# Patient Record
Sex: MALE | Race: ASIAN | NOT HISPANIC OR LATINO
[De-identification: names, ages, dates, MRNs, and addresses within clinical notes are randomized per-mention and may not be internally consistent; named-entity substitution may affect disease eponyms.]

---

## 2017-01-03 ENCOUNTER — APPOINTMENT (OUTPATIENT)
Dept: SURGERY | Facility: CLINIC | Age: 82
End: 2017-01-03

## 2017-01-11 ENCOUNTER — APPOINTMENT (OUTPATIENT)
Dept: INTERNAL MEDICINE | Facility: CLINIC | Age: 82
End: 2017-01-11

## 2017-01-11 DIAGNOSIS — F41.1 GENERALIZED ANXIETY DISORDER: ICD-10-CM

## 2017-01-30 ENCOUNTER — OUTPATIENT (OUTPATIENT)
Dept: OUTPATIENT SERVICES | Facility: HOSPITAL | Age: 82
LOS: 1 days | End: 2017-01-30
Payer: MEDICARE

## 2017-01-30 VITALS
HEART RATE: 57 BPM | RESPIRATION RATE: 16 BRPM | SYSTOLIC BLOOD PRESSURE: 122 MMHG | OXYGEN SATURATION: 99 % | HEIGHT: 67 IN | TEMPERATURE: 98 F | WEIGHT: 115.08 LBS | DIASTOLIC BLOOD PRESSURE: 76 MMHG

## 2017-01-30 DIAGNOSIS — C02.9 MALIGNANT NEOPLASM OF TONGUE, UNSPECIFIED: ICD-10-CM

## 2017-01-30 DIAGNOSIS — E03.9 HYPOTHYROIDISM, UNSPECIFIED: ICD-10-CM

## 2017-01-30 DIAGNOSIS — C80.1 MALIGNANT (PRIMARY) NEOPLASM, UNSPECIFIED: ICD-10-CM

## 2017-01-30 LAB
ALBUMIN SERPL ELPH-MCNC: 4.3 G/DL — SIGNIFICANT CHANGE UP (ref 3.3–5)
ALP SERPL-CCNC: 50 U/L — SIGNIFICANT CHANGE UP (ref 40–120)
ALT FLD-CCNC: 20 U/L — SIGNIFICANT CHANGE UP (ref 4–41)
AST SERPL-CCNC: 39 U/L — SIGNIFICANT CHANGE UP (ref 4–40)
BILIRUB SERPL-MCNC: 0.3 MG/DL — SIGNIFICANT CHANGE UP (ref 0.2–1.2)
BUN SERPL-MCNC: 27 MG/DL — HIGH (ref 7–23)
CALCIUM SERPL-MCNC: 9.3 MG/DL — SIGNIFICANT CHANGE UP (ref 8.4–10.5)
CHLORIDE SERPL-SCNC: 104 MMOL/L — SIGNIFICANT CHANGE UP (ref 98–107)
CO2 SERPL-SCNC: 23 MMOL/L — SIGNIFICANT CHANGE UP (ref 22–31)
CREAT SERPL-MCNC: 2.03 MG/DL — HIGH (ref 0.5–1.3)
GLUCOSE SERPL-MCNC: 80 MG/DL — SIGNIFICANT CHANGE UP (ref 70–99)
HCT VFR BLD CALC: 46.9 % — SIGNIFICANT CHANGE UP (ref 39–50)
HGB BLD-MCNC: 15.3 G/DL — SIGNIFICANT CHANGE UP (ref 13–17)
MCHC RBC-ENTMCNC: 30.2 PG — SIGNIFICANT CHANGE UP (ref 27–34)
MCHC RBC-ENTMCNC: 32.6 % — SIGNIFICANT CHANGE UP (ref 32–36)
MCV RBC AUTO: 92.7 FL — SIGNIFICANT CHANGE UP (ref 80–100)
PLATELET # BLD AUTO: 211 K/UL — SIGNIFICANT CHANGE UP (ref 150–400)
PMV BLD: 10.8 FL — SIGNIFICANT CHANGE UP (ref 7–13)
POTASSIUM SERPL-MCNC: 4.7 MMOL/L — SIGNIFICANT CHANGE UP (ref 3.5–5.3)
POTASSIUM SERPL-SCNC: 4.7 MMOL/L — SIGNIFICANT CHANGE UP (ref 3.5–5.3)
PROT SERPL-MCNC: 7.3 G/DL — SIGNIFICANT CHANGE UP (ref 6–8.3)
RBC # BLD: 5.06 M/UL — SIGNIFICANT CHANGE UP (ref 4.2–5.8)
RBC # FLD: 13.7 % — SIGNIFICANT CHANGE UP (ref 10.3–14.5)
SODIUM SERPL-SCNC: 140 MMOL/L — SIGNIFICANT CHANGE UP (ref 135–145)
WBC # BLD: 5.44 K/UL — SIGNIFICANT CHANGE UP (ref 3.8–10.5)
WBC # FLD AUTO: 5.44 K/UL — SIGNIFICANT CHANGE UP (ref 3.8–10.5)

## 2017-01-30 PROCEDURE — 93010 ELECTROCARDIOGRAM REPORT: CPT

## 2017-01-30 NOTE — H&P PST ADULT - FAMILY HISTORY
Sibling  Still living? Yes, Estimated age: Age Unknown  Family history of hypertension, Age at diagnosis: Age Unknown

## 2017-01-30 NOTE — H&P PST ADULT - PROBLEM SELECTOR PLAN 1
scheduled for left partial glossectomy 2/9/17  preop instructions, given pt verbalized understanding   pending copy of medical eval

## 2017-01-30 NOTE — H&P PST ADULT - HISTORY OF PRESENT ILLNESS
80y/o female presents for preop eval for scheduled left partial glossectomy on 2/8/2017. Pt with h/o 82y/o female presents for preop eval for scheduled left partial glossectomy on 2/8/2017. 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 Yearly follow up visit done.  Recent ct scan positive lesion to left side of tongue, biopsy positive for malignancy. 82y/o male presents for preop eval for scheduled left partial glossectomy on 2/8/2017. 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 Yearly follow up visit done.  Recent ct scan positive lesion to left side of tongue, biopsy positive for malignancy.

## 2017-01-30 NOTE — H&P PST ADULT - PMH
BPH (Benign Prostatic Hyperplasia)    Cancer of Tongue (ICD9 141.9)    Cataract    COPD (chronic obstructive pulmonary disease)  claustrophobic in small places  Hypothyroidism    Kidney mass    Liver hemangioma    MVP (mitral valve prolapse)    Tricuspid Murmur    Vertigo (ICD9 780.4) BPH (Benign Prostatic Hyperplasia)    Cancer of Tongue (ICD9 141.9)    Cataract    Claustrophobia    COPD (chronic obstructive pulmonary disease)  claustrophobic in small places  Decreased renal function    Hypothyroidism    Kidney mass    Liver hemangioma    MVP (mitral valve prolapse)    Tricuspid Murmur    Vertigo (ICD9 780.4)

## 2017-01-30 NOTE — H&P PST ADULT - LYMPHATIC
supraclavicular L/posterior cervical R/anterior cervical L/posterior cervical L/supraclavicular R/anterior cervical R

## 2017-01-30 NOTE — H&P PST ADULT - NSANTHOSAYNRD_GEN_A_CORE
No. PRETTY screening performed.  STOP BANG Legend: 0-2 = LOW Risk; 3-4 = INTERMEDIATE Risk; 5-8 = HIGH Risk

## 2017-01-30 NOTE — H&P PST ADULT - PSH
S/P tonsillectomy    Retinal detachment  right & left- laser surgery  bilateral  S/P cataract extraction and insertion of intraocular lens  right  Other postprocedural status  removed the cancer from tounge 2004

## 2017-02-07 ENCOUNTER — RESULT REVIEW (OUTPATIENT)
Age: 82
End: 2017-02-07

## 2017-02-07 RX ORDER — SODIUM CHLORIDE 9 MG/ML
1000 INJECTION INTRAMUSCULAR; INTRAVENOUS; SUBCUTANEOUS
Qty: 0 | Refills: 0 | Status: DISCONTINUED | OUTPATIENT
Start: 2017-02-08 | End: 2017-02-08

## 2017-02-08 ENCOUNTER — OUTPATIENT (OUTPATIENT)
Dept: OUTPATIENT SERVICES | Facility: HOSPITAL | Age: 82
LOS: 1 days | Discharge: HOME | End: 2017-02-08
Payer: MEDICARE

## 2017-02-08 ENCOUNTER — TRANSCRIPTION ENCOUNTER (OUTPATIENT)
Age: 82
End: 2017-02-08

## 2017-02-08 ENCOUNTER — APPOINTMENT (OUTPATIENT)
Dept: SURGERY | Facility: HOSPITAL | Age: 82
End: 2017-02-08

## 2017-02-08 ENCOUNTER — OTHER (OUTPATIENT)
Age: 82
End: 2017-02-08

## 2017-02-08 VITALS
DIASTOLIC BLOOD PRESSURE: 86 MMHG | TEMPERATURE: 98 F | OXYGEN SATURATION: 97 % | HEIGHT: 67 IN | RESPIRATION RATE: 18 BRPM | WEIGHT: 115.08 LBS | HEART RATE: 63 BPM | SYSTOLIC BLOOD PRESSURE: 152 MMHG

## 2017-02-08 VITALS
RESPIRATION RATE: 17 BRPM | DIASTOLIC BLOOD PRESSURE: 76 MMHG | OXYGEN SATURATION: 100 % | SYSTOLIC BLOOD PRESSURE: 144 MMHG | TEMPERATURE: 98 F | HEART RATE: 65 BPM

## 2017-02-08 DIAGNOSIS — C02.9 MALIGNANT NEOPLASM OF TONGUE, UNSPECIFIED: ICD-10-CM

## 2017-02-08 PROCEDURE — 88309 TISSUE EXAM BY PATHOLOGIST: CPT | Mod: 26

## 2017-02-08 PROCEDURE — 88305 TISSUE EXAM BY PATHOLOGIST: CPT | Mod: 26

## 2017-02-08 PROCEDURE — 88331 PATH CONSLTJ SURG 1 BLK 1SPC: CPT | Mod: 26

## 2017-02-08 RX ORDER — OXYCODONE HYDROCHLORIDE 5 MG/1
1 TABLET ORAL
Qty: 20 | Refills: 0
Start: 2017-02-08 | End: 2017-02-13

## 2017-02-08 RX ORDER — ACETAMINOPHEN 500 MG
2 TABLET ORAL
Qty: 0 | Refills: 0 | DISCHARGE
Start: 2017-02-08

## 2017-02-08 RX ORDER — LEVOTHYROXINE SODIUM 125 MCG
50 TABLET ORAL DAILY
Qty: 0 | Refills: 0 | Status: DISCONTINUED | OUTPATIENT
Start: 2017-02-08 | End: 2017-02-08

## 2017-02-08 RX ORDER — ACETAMINOPHEN 500 MG
650 TABLET ORAL EVERY 6 HOURS
Qty: 0 | Refills: 0 | Status: DISCONTINUED | OUTPATIENT
Start: 2017-02-08 | End: 2017-02-08

## 2017-02-08 RX ORDER — SODIUM CHLORIDE 9 MG/ML
1000 INJECTION, SOLUTION INTRAVENOUS
Qty: 0 | Refills: 0 | Status: DISCONTINUED | OUTPATIENT
Start: 2017-02-08 | End: 2017-02-08

## 2017-02-08 RX ADMIN — SODIUM CHLORIDE 30 MILLILITER(S): 9 INJECTION, SOLUTION INTRAVENOUS at 08:50

## 2017-02-08 RX ADMIN — SODIUM CHLORIDE 30 MILLILITER(S): 9 INJECTION, SOLUTION INTRAVENOUS at 13:14

## 2017-02-08 NOTE — DISCHARGE NOTE ADULT - HOSPITAL COURSE
2/8/17 - left partial glossectomy 2/8/17 - left partial glossectomy  Patient is ambulating, voiding, and pain is controlled on current pain medication. Patient is tolerating soft/liquid diet. Stable for discharge home as per surgery team.

## 2017-02-08 NOTE — ASU PATIENT PROFILE, ADULT - PMH
BPH (Benign Prostatic Hyperplasia)    Cancer of Tongue (ICD9 141.9)    Cataract    Claustrophobia    COPD (chronic obstructive pulmonary disease)  claustrophobic in small places  Decreased renal function    Hypothyroidism    Kidney mass    Liver hemangioma    MVP (mitral valve prolapse)    Tricuspid Murmur    Vertigo (ICD9 780.4)

## 2017-02-08 NOTE — DISCHARGE NOTE ADULT - CONDITIONS AT DISCHARGE
Alert & oriented. Out of bed ambulating. Tolerating clear liquid diet without nausea or vomiting. Left partial glossectomy sutures are intact no oral bleeding. Voiding without difficulty. Vitals stable, afebrile. Understands all discharge instruction & will follow up with the MD. Time allowed for questions.

## 2017-02-08 NOTE — DISCHARGE NOTE ADULT - ADDITIONAL INSTRUCTIONS
Please call Dr. Mackenzie to make an appointment in one week [phone].  Please follow up with primary medical doctor within one week of discharge regarding hospitalization.

## 2017-02-08 NOTE — DISCHARGE NOTE ADULT - CARE PROVIDERS DIRECT ADDRESSES
[email or web address]

## 2017-02-08 NOTE — DISCHARGE NOTE ADULT - SECONDARY DIAGNOSIS.
MVP (mitral valve prolapse) Claustrophobia COPD (chronic obstructive pulmonary disease) Decreased renal function Hypothyroidism Tricuspid murmur

## 2017-02-08 NOTE — DISCHARGE NOTE ADULT - CARE PROVIDER_API CALL
Fredy Mackenzie), Plastic Surgery; Surgery  [address] [place] [id number]  Phone: [phone]  Fax: [phone]

## 2017-02-08 NOTE — DISCHARGE NOTE ADULT - INSTRUCTIONS
Watch for signs of infection; redness, swelling, fever, chills or heat, report such symptoms to the MD. No driving while taking pain medication, it causes drowsiness & constipation. Drink 6-8 glasses of fluids daily to promote hydration. No heavy lifting, pulling or pushing heavy objects. Follow up with primary & surgical MD. soft/liquid diet as tolerated soft diet as tolerated Watch for signs of infection; redness, swelling, fever, chills or heat, & or any oral bleeding, report such symptoms to the MD. No driving while taking pain medication, it causes drowsiness & constipation. Drink 6-8 glasses of fluids daily to promote hydration. No heavy lifting, pulling or pushing heavy objects. Do not use a straw to drink. Follow up with primary & surgical MD. Alert & oriented. Out of bed ambulating. Tolerating clear liquid diet without nausea or vomiting. Left partial glossectomy sutures are intact no oral bleeding. Voiding without difficulty. Vitals stable, afebrile. Understands all discharge instruction & will follow up with the MD. Time allowed for questions.

## 2017-02-08 NOTE — DISCHARGE NOTE ADULT - MEDICATION SUMMARY - MEDICATIONS TO TAKE
I will START or STAY ON the medications listed below when I get home from the hospital:    calcium 500mg  -- 1 tab(s) by mouth once a day in am  -- Indication: For Malignant neoplasm of tongue    vitamin D  -- 1000 international unit(s) by mouth once a day in am  -- Indication: For Malignant neoplasm of tongue    magnesium 500mg  -- 1 tab(s) by mouth once a day, As Needed  -- Indication: For Malignant neoplasm of tongue    acetaminophen 325 mg oral tablet  -- 2 tab(s) by mouth every 6 hours, As needed, Moderate Pain (4 - 6)  -- Indication: For Malignant neoplasm of tongue    acetaminophen-oxyCODONE 325 mg-5 mg oral tablet  -- 1 tab(s) by mouth every 6 hours, As needed, Severe Pain (7 - 10) MDD:4  -- Indication: For Malignant neoplasm of tongue    alendronate 70 mg oral tablet  -- 1 tab(s) by mouth once a week  -- Indication: For Malignant neoplasm of tongue    Systane ophthalmic solution  -- 1 drop(s) to each affected eye 2 times a day, As Needed  -- Indication: For Malignant neoplasm of tongue    levothyroxine 50 mcg (0.05 mg) oral tablet  -- 1 tab(s) by mouth once a day in am  -- Indication: For Malignant neoplasm of tongue

## 2017-02-08 NOTE — DISCHARGE NOTE ADULT - PLAN OF CARE
BATHING: Please do not submerge wound underwater.   ACTIVITY: No heavy lifting or straining. Otherwise, you may return to your usual level of physical activity. If you are taking narcotic pain medication (such as Percocet) DO NOT drive a car, operate machinery or make important decisions.  DIET: Soft diet as tolerated  NOTIFY YOUR SURGEON IF: You have any bleeding that does not stop, any pus draining from your wound(s), any fever (over 100.4 F) or chills, persistent nausea/vomiting, persistent diarrhea, or if your pain is not controlled on your discharge pain medications.  FOLLOW-UP: Please follow up with your primary care physician in one week regarding your hospitalization. Please call Dr. Mackenzie to make an appointment in one week [phone] You had surgery, pain control

## 2017-02-08 NOTE — DISCHARGE NOTE ADULT - CARE PLAN
Principal Discharge DX:	Squamous cell carcinoma of tongue  Secondary Diagnosis:	MVP (mitral valve prolapse)  Secondary Diagnosis:	Claustrophobia  Secondary Diagnosis:	COPD (chronic obstructive pulmonary disease)  Secondary Diagnosis:	Decreased renal function  Secondary Diagnosis:	Hypothyroidism  Secondary Diagnosis:	Tricuspid murmur Principal Discharge DX:	Squamous cell carcinoma of tongue  Goal:	You had surgery, pain control  Instructions for follow-up, activity and diet:	BATHING: Please do not submerge wound underwater.   ACTIVITY: No heavy lifting or straining. Otherwise, you may return to your usual level of physical activity. If you are taking narcotic pain medication (such as Percocet) DO NOT drive a car, operate machinery or make important decisions.  DIET: Soft diet as tolerated  NOTIFY YOUR SURGEON IF: You have any bleeding that does not stop, any pus draining from your wound(s), any fever (over 100.4 F) or chills, persistent nausea/vomiting, persistent diarrhea, or if your pain is not controlled on your discharge pain medications.  FOLLOW-UP: Please follow up with your primary care physician in one week regarding your hospitalization. Please call Dr. Mackenzie to make an appointment in one week [phone]  Secondary Diagnosis:	MVP (mitral valve prolapse)  Secondary Diagnosis:	Claustrophobia  Secondary Diagnosis:	COPD (chronic obstructive pulmonary disease)  Secondary Diagnosis:	Decreased renal function  Secondary Diagnosis:	Hypothyroidism  Secondary Diagnosis:	Tricuspid murmur Principal Discharge DX:	Squamous cell carcinoma of tongue  Goal:	You had surgery, pain control  Instructions for follow-up, activity and diet:	BATHING: Please do not submerge wound underwater.   ACTIVITY: No heavy lifting or straining. Otherwise, you may return to your usual level of physical activity. If you are taking narcotic pain medication (such as Percocet) DO NOT drive a car, operate machinery or make important decisions.  DIET: Soft diet as tolerated  NOTIFY YOUR SURGEON IF: You have any bleeding that does not stop, any pus draining from your wound(s), any fever (over 100.4 F) or chills, persistent nausea/vomiting, persistent diarrhea, or if your pain is not controlled on your discharge pain medications.  FOLLOW-UP: Please follow up with your primary care physician in one week regarding your hospitalization. Please call Dr. Mackenzie to make an appointment in one week [phone]  Secondary Diagnosis:	MVP (mitral valve prolapse)  Secondary Diagnosis:	Claustrophobia  Secondary Diagnosis:	COPD (chronic obstructive pulmonary disease)  Secondary Diagnosis:	Decreased renal function  Secondary Diagnosis:	Hypothyroidism  Secondary Diagnosis:	Tricuspid murmur Principal Discharge DX:	Squamous cell carcinoma of tongue  Goal:	You had surgery, pain control  Instructions for follow-up, activity and diet:	BATHING: Please do not submerge wound underwater.   ACTIVITY: No heavy lifting or straining. Otherwise, you may return to your usual level of physical activity. If you are taking narcotic pain medication (such as Percocet) DO NOT drive a car, operate machinery or make important decisions.  DIET: Soft diet as tolerated  NOTIFY YOUR SURGEON IF: You have any bleeding that does not stop, any pus draining from your wound(s), any fever (over 100.4 F) or chills, persistent nausea/vomiting, persistent diarrhea, or if your pain is not controlled on your discharge pain medications.  FOLLOW-UP: Please follow up with your primary care physician in one week regarding your hospitalization. Please call Dr. Mackenzie to make an appointment in one week [phone]  Secondary Diagnosis:	MVP (mitral valve prolapse)  Secondary Diagnosis:	Claustrophobia  Secondary Diagnosis:	COPD (chronic obstructive pulmonary disease)  Secondary Diagnosis:	Decreased renal function  Secondary Diagnosis:	Hypothyroidism  Secondary Diagnosis:	Tricuspid murmur

## 2017-02-08 NOTE — DISCHARGE NOTE ADULT - PATIENT PORTAL LINK FT
“You can access the [facility] Patient Portal, offered by [facility], by registering with the following website: [email or web address]”

## 2017-02-16 ENCOUNTER — APPOINTMENT (OUTPATIENT)
Dept: INTERNAL MEDICINE | Facility: CLINIC | Age: 82
End: 2017-02-16

## 2017-02-23 ENCOUNTER — APPOINTMENT (OUTPATIENT)
Dept: SURGERY | Facility: CLINIC | Age: 82
End: 2017-02-23

## 2017-02-28 LAB — SURGICAL PATHOLOGY STUDY: SIGNIFICANT CHANGE UP

## 2017-03-16 ENCOUNTER — APPOINTMENT (OUTPATIENT)
Dept: OTOLARYNGOLOGY | Facility: CLINIC | Age: 82
End: 2017-03-16

## 2017-03-16 VITALS
BODY MASS INDEX: 23.54 KG/M2 | WEIGHT: 150 LBS | DIASTOLIC BLOOD PRESSURE: 77 MMHG | HEIGHT: 67 IN | SYSTOLIC BLOOD PRESSURE: 141 MMHG | HEART RATE: 67 BPM

## 2017-03-16 DIAGNOSIS — Z86.39 PERSONAL HISTORY OF OTHER ENDOCRINE, NUTRITIONAL AND METABOLIC DISEASE: ICD-10-CM

## 2017-03-16 DIAGNOSIS — H93.12 TINNITUS, LEFT EAR: ICD-10-CM

## 2017-03-28 ENCOUNTER — APPOINTMENT (OUTPATIENT)
Dept: SURGERY | Facility: CLINIC | Age: 82
End: 2017-03-28

## 2017-04-11 ENCOUNTER — APPOINTMENT (OUTPATIENT)
Dept: INTERNAL MEDICINE | Facility: CLINIC | Age: 82
End: 2017-04-11

## 2017-04-11 LAB
BASOPHILS # BLD AUTO: 0.02 K/UL
BASOPHILS NFR BLD AUTO: 0.5 %
EOSINOPHIL # BLD AUTO: 0.21 K/UL
EOSINOPHIL NFR BLD AUTO: 4.9 %
HCT VFR BLD CALC: 45.3 %
HGB BLD-MCNC: 14.5 G/DL
IMM GRANULOCYTES NFR BLD AUTO: 0 %
LYMPHOCYTES # BLD AUTO: 1.54 K/UL
LYMPHOCYTES NFR BLD AUTO: 36.1 %
MAN DIFF?: NORMAL
MCHC RBC-ENTMCNC: 29.6 PG
MCHC RBC-ENTMCNC: 32 GM/DL
MCV RBC AUTO: 92.4 FL
MONOCYTES # BLD AUTO: 0.49 K/UL
MONOCYTES NFR BLD AUTO: 11.5 %
NEUTROPHILS # BLD AUTO: 2.01 K/UL
NEUTROPHILS NFR BLD AUTO: 47 %
PLATELET # BLD AUTO: 217 K/UL
RBC # BLD: 4.9 M/UL
RBC # FLD: 13.7 %
WBC # FLD AUTO: 4.27 K/UL

## 2017-04-12 LAB
25(OH)D3 SERPL-MCNC: 44.2 NG/ML
ALBUMIN SERPL ELPH-MCNC: 4.2 G/DL
ALP BLD-CCNC: 47 U/L
ALT SERPL-CCNC: 17 U/L
ANION GAP SERPL CALC-SCNC: 18 MMOL/L
APPEARANCE: CLEAR
AST SERPL-CCNC: 29 U/L
BILIRUB SERPL-MCNC: 0.6 MG/DL
BILIRUBIN URINE: NEGATIVE
BLOOD URINE: NEGATIVE
BUN SERPL-MCNC: 25 MG/DL
CALCIUM SERPL-MCNC: 9.6 MG/DL
CHLORIDE SERPL-SCNC: 102 MMOL/L
CHOLEST SERPL-MCNC: 156 MG/DL
CHOLEST/HDLC SERPL: 2.4 RATIO
CO2 SERPL-SCNC: 21 MMOL/L
COLOR: YELLOW
CREAT SERPL-MCNC: 2.04 MG/DL
GLUCOSE QUALITATIVE U: NORMAL MG/DL
GLUCOSE SERPL-MCNC: 87 MG/DL
HDLC SERPL-MCNC: 66 MG/DL
KETONES URINE: NEGATIVE
LDLC SERPL CALC-MCNC: 63 MG/DL
LEUKOCYTE ESTERASE URINE: NEGATIVE
NITRITE URINE: NEGATIVE
PH URINE: 6.5
POTASSIUM SERPL-SCNC: 5.4 MMOL/L
PROT SERPL-MCNC: 7.1 G/DL
PROTEIN URINE: NEGATIVE MG/DL
PSA SERPL-MCNC: 2.61 NG/ML
SODIUM SERPL-SCNC: 141 MMOL/L
SPECIFIC GRAVITY URINE: 1.01
T4 SERPL-MCNC: 7.4 UG/DL
TRIGL SERPL-MCNC: 137 MG/DL
TSH SERPL-ACNC: 5.01 UIU/ML
UROBILINOGEN URINE: NORMAL MG/DL

## 2017-04-20 ENCOUNTER — NON-APPOINTMENT (OUTPATIENT)
Age: 82
End: 2017-04-20

## 2017-04-20 ENCOUNTER — APPOINTMENT (OUTPATIENT)
Dept: INTERNAL MEDICINE | Facility: CLINIC | Age: 82
End: 2017-04-20

## 2017-04-20 VITALS
DIASTOLIC BLOOD PRESSURE: 70 MMHG | WEIGHT: 118 LBS | HEIGHT: 68 IN | HEART RATE: 64 BPM | SYSTOLIC BLOOD PRESSURE: 130 MMHG | BODY MASS INDEX: 17.88 KG/M2 | RESPIRATION RATE: 16 BRPM

## 2017-04-20 RX ORDER — ALENDRONATE SODIUM 70 MG/1
70 TABLET ORAL
Refills: 0 | Status: DISCONTINUED | COMMUNITY
End: 2017-04-20

## 2017-04-20 RX ORDER — DIAZEPAM 5 MG/1
5 TABLET ORAL
Qty: 60 | Refills: 0 | Status: DISCONTINUED | COMMUNITY
Start: 2017-01-11 | End: 2017-04-20

## 2017-04-27 ENCOUNTER — FORM ENCOUNTER (OUTPATIENT)
Age: 82
End: 2017-04-27

## 2017-04-28 ENCOUNTER — APPOINTMENT (OUTPATIENT)
Dept: RADIOLOGY | Facility: IMAGING CENTER | Age: 82
End: 2017-04-28

## 2017-04-28 ENCOUNTER — OUTPATIENT (OUTPATIENT)
Dept: OUTPATIENT SERVICES | Facility: HOSPITAL | Age: 82
LOS: 1 days | End: 2017-04-28
Payer: MEDICARE

## 2017-04-28 DIAGNOSIS — Z00.8 ENCOUNTER FOR OTHER GENERAL EXAMINATION: ICD-10-CM

## 2017-04-28 PROCEDURE — 77080 DXA BONE DENSITY AXIAL: CPT

## 2017-05-25 ENCOUNTER — APPOINTMENT (OUTPATIENT)
Dept: SURGERY | Facility: CLINIC | Age: 82
End: 2017-05-25

## 2017-06-20 ENCOUNTER — RX RENEWAL (OUTPATIENT)
Age: 82
End: 2017-06-20

## 2017-06-29 ENCOUNTER — APPOINTMENT (OUTPATIENT)
Dept: OPHTHALMOLOGY | Facility: CLINIC | Age: 82
End: 2017-06-29

## 2017-07-11 ENCOUNTER — APPOINTMENT (OUTPATIENT)
Dept: SURGERY | Facility: CLINIC | Age: 82
End: 2017-07-11

## 2017-08-24 ENCOUNTER — APPOINTMENT (OUTPATIENT)
Dept: SURGERY | Facility: CLINIC | Age: 82
End: 2017-08-24
Payer: MEDICARE

## 2017-08-24 PROCEDURE — 99213 OFFICE O/P EST LOW 20 MIN: CPT

## 2017-10-09 ENCOUNTER — MEDICATION RENEWAL (OUTPATIENT)
Age: 82
End: 2017-10-09

## 2017-10-25 ENCOUNTER — APPOINTMENT (OUTPATIENT)
Dept: INTERNAL MEDICINE | Facility: CLINIC | Age: 82
End: 2017-10-25
Payer: MEDICARE

## 2017-10-25 DIAGNOSIS — Z23 ENCOUNTER FOR IMMUNIZATION: ICD-10-CM

## 2017-10-25 PROCEDURE — 90662 IIV NO PRSV INCREASED AG IM: CPT

## 2017-10-25 PROCEDURE — G0008: CPT

## 2017-10-25 PROCEDURE — 36415 COLL VENOUS BLD VENIPUNCTURE: CPT

## 2017-10-25 PROCEDURE — 99213 OFFICE O/P EST LOW 20 MIN: CPT | Mod: 25

## 2017-10-26 LAB
ANION GAP SERPL CALC-SCNC: 15 MMOL/L
BUN SERPL-MCNC: 24 MG/DL
CALCIUM SERPL-MCNC: 9.8 MG/DL
CHLORIDE SERPL-SCNC: 97 MMOL/L
CO2 SERPL-SCNC: 21 MMOL/L
CREAT SERPL-MCNC: 2.1 MG/DL
GLUCOSE SERPL-MCNC: 82 MG/DL
POTASSIUM SERPL-SCNC: 5.9 MMOL/L
SODIUM SERPL-SCNC: 133 MMOL/L
TSH SERPL-ACNC: 4.85 UIU/ML

## 2017-10-29 ENCOUNTER — FORM ENCOUNTER (OUTPATIENT)
Age: 82
End: 2017-10-29

## 2017-10-30 ENCOUNTER — APPOINTMENT (OUTPATIENT)
Dept: ULTRASOUND IMAGING | Facility: IMAGING CENTER | Age: 82
End: 2017-10-30

## 2017-10-30 ENCOUNTER — APPOINTMENT (OUTPATIENT)
Dept: UROLOGY | Facility: CLINIC | Age: 82
End: 2017-10-30
Payer: MEDICARE

## 2017-10-30 ENCOUNTER — OUTPATIENT (OUTPATIENT)
Dept: OUTPATIENT SERVICES | Facility: HOSPITAL | Age: 82
LOS: 1 days | End: 2017-10-30
Payer: MEDICARE

## 2017-10-30 ENCOUNTER — APPOINTMENT (OUTPATIENT)
Dept: RADIOLOGY | Facility: IMAGING CENTER | Age: 82
End: 2017-10-30

## 2017-10-30 DIAGNOSIS — N40.1 BENIGN PROSTATIC HYPERPLASIA WITH LOWER URINARY TRACT SYMPTOMS: ICD-10-CM

## 2017-10-30 PROCEDURE — 71046 X-RAY EXAM CHEST 2 VIEWS: CPT

## 2017-10-30 PROCEDURE — 71020: CPT | Mod: 26

## 2017-10-30 PROCEDURE — 99214 OFFICE O/P EST MOD 30 MIN: CPT

## 2017-10-30 PROCEDURE — 76770 US EXAM ABDO BACK WALL COMP: CPT | Mod: 26

## 2017-10-30 PROCEDURE — 76770 US EXAM ABDO BACK WALL COMP: CPT

## 2017-10-31 ENCOUNTER — APPOINTMENT (OUTPATIENT)
Dept: SURGERY | Facility: CLINIC | Age: 82
End: 2017-10-31
Payer: MEDICARE

## 2017-10-31 PROCEDURE — 99213 OFFICE O/P EST LOW 20 MIN: CPT

## 2017-11-21 ENCOUNTER — APPOINTMENT (OUTPATIENT)
Dept: INTERNAL MEDICINE | Facility: CLINIC | Age: 82
End: 2017-11-21
Payer: MEDICARE

## 2017-11-21 VITALS — SYSTOLIC BLOOD PRESSURE: 130 MMHG | DIASTOLIC BLOOD PRESSURE: 80 MMHG

## 2017-11-21 PROCEDURE — 36415 COLL VENOUS BLD VENIPUNCTURE: CPT

## 2017-11-21 PROCEDURE — 99213 OFFICE O/P EST LOW 20 MIN: CPT | Mod: 25

## 2017-11-29 ENCOUNTER — RESULT REVIEW (OUTPATIENT)
Age: 82
End: 2017-11-29

## 2017-11-29 LAB
ALBUMIN SERPL ELPH-MCNC: 4.2 G/DL
ALP BLD-CCNC: 57 U/L
ALT SERPL-CCNC: 17 U/L
ANION GAP SERPL CALC-SCNC: 14 MMOL/L
AST SERPL-CCNC: 39 U/L
BILIRUB SERPL-MCNC: 0.3 MG/DL
BUN SERPL-MCNC: 25 MG/DL
CALCIUM SERPL-MCNC: 9.8 MG/DL
CHLORIDE SERPL-SCNC: 98 MMOL/L
CO2 SERPL-SCNC: 23 MMOL/L
CREAT SERPL-MCNC: 1.83 MG/DL
EOSINOPHIL # BLD AUTO: 0.11 K/UL
EOSINOPHIL NFR BLD AUTO: 1.9
GLUCOSE SERPL-MCNC: 102 MG/DL
HGB BLD-MCNC: 14.5 G/DL
IMM GRANULOCYTES NFR BLD AUTO: 0.4 %
LYMPHOCYTES # BLD AUTO: 1.09 K/UL
LYMPHOCYTES NFR BLD AUTO: 19.1 %
MAN DIFF?: NORMAL
MCV RBC AUTO: 94.5 FL
MONOCYTES # BLD AUTO: 0.57 K/UL
MONOCYTES NFR BLD AUTO: 10 %
POTASSIUM SERPL-SCNC: 5.8 MMOL/L
PROT SERPL-MCNC: 7.2 G/DL
RBC # FLD: 13.9 %
SODIUM SERPL-SCNC: 135 MMOL/L

## 2017-12-11 ENCOUNTER — APPOINTMENT (OUTPATIENT)
Dept: OPHTHALMOLOGY | Facility: CLINIC | Age: 82
End: 2017-12-11
Payer: MEDICARE

## 2017-12-11 PROCEDURE — 92014 COMPRE OPH EXAM EST PT 1/>: CPT

## 2017-12-11 PROCEDURE — 92133 CPTRZD OPH DX IMG PST SGM ON: CPT

## 2017-12-13 LAB
BASOPHILS # BLD AUTO: 0.02 K/UL
BASOPHILS NFR BLD AUTO: 0.4 %
HCT VFR BLD CALC: 44.9 %
MCHC RBC-ENTMCNC: 30.5 PG
MCHC RBC-ENTMCNC: 32.3 GM/DL
NEUTROPHILS # BLD AUTO: 3.9 K/UL
NEUTROPHILS NFR BLD AUTO: 68.2 %
PLATELET # BLD AUTO: 235 K/UL
RBC # BLD: 4.75 M/UL
WBC # FLD AUTO: 5.71 K/UL

## 2018-02-19 ENCOUNTER — TRANSCRIPTION ENCOUNTER (OUTPATIENT)
Age: 83
End: 2018-02-19

## 2018-02-23 ENCOUNTER — APPOINTMENT (OUTPATIENT)
Dept: INTERNAL MEDICINE | Facility: CLINIC | Age: 83
End: 2018-02-23
Payer: MEDICARE

## 2018-02-23 VITALS
BODY MASS INDEX: 17.88 KG/M2 | SYSTOLIC BLOOD PRESSURE: 130 MMHG | WEIGHT: 118 LBS | TEMPERATURE: 98.2 F | DIASTOLIC BLOOD PRESSURE: 80 MMHG | HEIGHT: 68 IN

## 2018-02-23 DIAGNOSIS — Z87.09 PERSONAL HISTORY OF OTHER DISEASES OF THE RESPIRATORY SYSTEM: ICD-10-CM

## 2018-02-23 PROCEDURE — 99213 OFFICE O/P EST LOW 20 MIN: CPT

## 2018-02-23 RX ORDER — AMOXICILLIN 500 MG/1
500 CAPSULE ORAL 3 TIMES DAILY
Qty: 21 | Refills: 0 | Status: DISCONTINUED | COMMUNITY
Start: 2017-12-26 | End: 2018-02-23

## 2018-03-01 ENCOUNTER — APPOINTMENT (OUTPATIENT)
Dept: SURGERY | Facility: CLINIC | Age: 83
End: 2018-03-01
Payer: MEDICARE

## 2018-03-01 PROCEDURE — 99213 OFFICE O/P EST LOW 20 MIN: CPT

## 2018-03-07 ENCOUNTER — APPOINTMENT (OUTPATIENT)
Dept: OPHTHALMOLOGY | Facility: CLINIC | Age: 83
End: 2018-03-07

## 2018-03-19 ENCOUNTER — APPOINTMENT (OUTPATIENT)
Dept: INTERNAL MEDICINE | Facility: CLINIC | Age: 83
End: 2018-03-19
Payer: MEDICARE

## 2018-03-19 PROCEDURE — 36415 COLL VENOUS BLD VENIPUNCTURE: CPT

## 2018-03-21 LAB
25(OH)D3 SERPL-MCNC: 40.1 NG/ML
ALBUMIN SERPL ELPH-MCNC: 4.4 G/DL
ALP BLD-CCNC: 51 U/L
ALT SERPL-CCNC: 19 U/L
ANION GAP SERPL CALC-SCNC: 12 MMOL/L
APPEARANCE: CLEAR
AST SERPL-CCNC: 32 U/L
BASOPHILS # BLD AUTO: 0.02 K/UL
BASOPHILS NFR BLD AUTO: 0.4 %
BILIRUB SERPL-MCNC: 0.4 MG/DL
BILIRUBIN URINE: NEGATIVE
BLOOD URINE: NEGATIVE
BUN SERPL-MCNC: 23 MG/DL
CALCIUM SERPL-MCNC: 9.6 MG/DL
CHLORIDE SERPL-SCNC: 104 MMOL/L
CHOLEST SERPL-MCNC: 148 MG/DL
CHOLEST/HDLC SERPL: 2.3 RATIO
CO2 SERPL-SCNC: 24 MMOL/L
COLOR: YELLOW
CREAT SERPL-MCNC: 2.07 MG/DL
CREAT SPEC-SCNC: 104 MG/DL
EOSINOPHIL # BLD AUTO: 0.13 K/UL
EOSINOPHIL NFR BLD AUTO: 2.5 %
GLUCOSE QUALITATIVE U: NEGATIVE MG/DL
GLUCOSE SERPL-MCNC: 86 MG/DL
HBA1C MFR BLD HPLC: 5.7 %
HCT VFR BLD CALC: 44.2 %
HDLC SERPL-MCNC: 64 MG/DL
HGB BLD-MCNC: 14.8 G/DL
IMM GRANULOCYTES NFR BLD AUTO: 0 %
KETONES URINE: NEGATIVE
LDLC SERPL CALC-MCNC: 51 MG/DL
LEUKOCYTE ESTERASE URINE: NEGATIVE
LYMPHOCYTES # BLD AUTO: 1.01 K/UL
LYMPHOCYTES NFR BLD AUTO: 19.5 %
MAN DIFF?: NORMAL
MCHC RBC-ENTMCNC: 31 PG
MCHC RBC-ENTMCNC: 33.5 GM/DL
MCV RBC AUTO: 92.5 FL
MICROALBUMIN 24H UR DL<=1MG/L-MCNC: 0.6 MG/DL
MICROALBUMIN/CREAT 24H UR-RTO: 6 MG/G
MONOCYTES # BLD AUTO: 0.48 K/UL
MONOCYTES NFR BLD AUTO: 9.3 %
NEUTROPHILS # BLD AUTO: 3.53 K/UL
NEUTROPHILS NFR BLD AUTO: 68.3 %
NITRITE URINE: NEGATIVE
PH URINE: 6
PLATELET # BLD AUTO: 209 K/UL
POTASSIUM SERPL-SCNC: 5.5 MMOL/L
PROT SERPL-MCNC: 7.6 G/DL
PROTEIN URINE: NEGATIVE MG/DL
PSA SERPL-MCNC: 2.59 NG/ML
RBC # BLD: 4.78 M/UL
RBC # FLD: 14.1 %
SODIUM SERPL-SCNC: 140 MMOL/L
SPECIFIC GRAVITY URINE: 1.01
T4 SERPL-MCNC: 7.4 UG/DL
TRIGL SERPL-MCNC: 165 MG/DL
TSH SERPL-ACNC: 6.62 UIU/ML
UROBILINOGEN URINE: NEGATIVE MG/DL
WBC # FLD AUTO: 5.17 K/UL

## 2018-03-29 ENCOUNTER — NON-APPOINTMENT (OUTPATIENT)
Age: 83
End: 2018-03-29

## 2018-03-29 ENCOUNTER — APPOINTMENT (OUTPATIENT)
Dept: INTERNAL MEDICINE | Facility: CLINIC | Age: 83
End: 2018-03-29
Payer: MEDICARE

## 2018-03-29 VITALS
HEIGHT: 68 IN | WEIGHT: 116 LBS | BODY MASS INDEX: 17.58 KG/M2 | HEART RATE: 50 BPM | SYSTOLIC BLOOD PRESSURE: 122 MMHG | RESPIRATION RATE: 15 BRPM | DIASTOLIC BLOOD PRESSURE: 70 MMHG

## 2018-03-29 PROCEDURE — 93000 ELECTROCARDIOGRAM COMPLETE: CPT

## 2018-03-29 PROCEDURE — G0439: CPT

## 2018-03-29 PROCEDURE — 99214 OFFICE O/P EST MOD 30 MIN: CPT | Mod: 25

## 2018-03-29 RX ORDER — DOXYCYCLINE HYCLATE 100 MG/1
100 CAPSULE ORAL
Qty: 14 | Refills: 0 | Status: DISCONTINUED | COMMUNITY
Start: 2018-02-19 | End: 2018-03-29

## 2018-03-29 RX ORDER — OSELTAMIVIR PHOSPHATE 6 MG/ML
6 POWDER, FOR SUSPENSION ORAL
Qty: 60 | Refills: 0 | Status: DISCONTINUED | COMMUNITY
Start: 2018-02-19 | End: 2018-03-29

## 2018-03-29 RX ORDER — OXYCODONE AND ACETAMINOPHEN 5; 325 MG/1; MG/1
5-325 TABLET ORAL
Qty: 20 | Refills: 0 | Status: DISCONTINUED | COMMUNITY
Start: 2017-02-08 | End: 2018-03-29

## 2018-03-29 RX ORDER — IPRATROPIUM BROMIDE 21 UG/1
0.03 SPRAY NASAL
Qty: 30 | Refills: 0 | Status: DISCONTINUED | COMMUNITY
Start: 2018-02-19 | End: 2018-03-29

## 2018-03-29 RX ORDER — BENZONATATE 200 MG/1
200 CAPSULE ORAL EVERY 8 HOURS
Qty: 30 | Refills: 0 | Status: DISCONTINUED | COMMUNITY
Start: 2018-02-23 | End: 2018-03-29

## 2018-05-29 ENCOUNTER — APPOINTMENT (OUTPATIENT)
Dept: SURGERY | Facility: CLINIC | Age: 83
End: 2018-05-29
Payer: MEDICARE

## 2018-05-29 PROCEDURE — 99213 OFFICE O/P EST LOW 20 MIN: CPT

## 2018-05-30 ENCOUNTER — APPOINTMENT (OUTPATIENT)
Dept: OPHTHALMOLOGY | Facility: CLINIC | Age: 83
End: 2018-05-30
Payer: MEDICARE

## 2018-05-30 PROCEDURE — 92012 INTRM OPH EXAM EST PATIENT: CPT

## 2018-05-30 PROCEDURE — 92083 EXTENDED VISUAL FIELD XM: CPT

## 2018-05-31 ENCOUNTER — FORM ENCOUNTER (OUTPATIENT)
Age: 83
End: 2018-05-31

## 2018-06-01 ENCOUNTER — OUTPATIENT (OUTPATIENT)
Dept: OUTPATIENT SERVICES | Facility: HOSPITAL | Age: 83
LOS: 1 days | End: 2018-06-01
Payer: MEDICARE

## 2018-06-01 ENCOUNTER — APPOINTMENT (OUTPATIENT)
Dept: CT IMAGING | Facility: IMAGING CENTER | Age: 83
End: 2018-06-01
Payer: MEDICARE

## 2018-06-01 DIAGNOSIS — Z00.8 ENCOUNTER FOR OTHER GENERAL EXAMINATION: ICD-10-CM

## 2018-06-01 PROCEDURE — 70490 CT SOFT TISSUE NECK W/O DYE: CPT

## 2018-06-01 PROCEDURE — 70490 CT SOFT TISSUE NECK W/O DYE: CPT | Mod: 26

## 2018-06-05 ENCOUNTER — OTHER (OUTPATIENT)
Age: 83
End: 2018-06-05

## 2018-08-16 ENCOUNTER — APPOINTMENT (OUTPATIENT)
Dept: SURGERY | Facility: CLINIC | Age: 83
End: 2018-08-16

## 2018-10-02 PROBLEM — F40.240 CLAUSTROPHOBIA: Chronic | Status: ACTIVE | Noted: 2017-01-30

## 2018-10-08 ENCOUNTER — RX RENEWAL (OUTPATIENT)
Age: 83
End: 2018-10-08

## 2018-10-08 ENCOUNTER — MEDICATION RENEWAL (OUTPATIENT)
Age: 83
End: 2018-10-08

## 2018-10-08 ENCOUNTER — APPOINTMENT (OUTPATIENT)
Dept: INTERNAL MEDICINE | Facility: CLINIC | Age: 83
End: 2018-10-08
Payer: MEDICARE

## 2018-10-08 PROCEDURE — G0008: CPT

## 2018-10-08 PROCEDURE — 90662 IIV NO PRSV INCREASED AG IM: CPT

## 2018-10-25 ENCOUNTER — APPOINTMENT (OUTPATIENT)
Dept: UROLOGY | Facility: CLINIC | Age: 83
End: 2018-10-25
Payer: MEDICARE

## 2018-10-25 PROCEDURE — 99213 OFFICE O/P EST LOW 20 MIN: CPT

## 2018-11-08 ENCOUNTER — FORM ENCOUNTER (OUTPATIENT)
Age: 83
End: 2018-11-08

## 2018-11-09 ENCOUNTER — APPOINTMENT (OUTPATIENT)
Dept: RADIOLOGY | Facility: IMAGING CENTER | Age: 83
End: 2018-11-09
Payer: MEDICARE

## 2018-11-09 ENCOUNTER — APPOINTMENT (OUTPATIENT)
Dept: ULTRASOUND IMAGING | Facility: IMAGING CENTER | Age: 83
End: 2018-11-09
Payer: MEDICARE

## 2018-11-09 ENCOUNTER — OUTPATIENT (OUTPATIENT)
Dept: OUTPATIENT SERVICES | Facility: HOSPITAL | Age: 83
LOS: 1 days | End: 2018-11-09
Payer: MEDICARE

## 2018-11-09 DIAGNOSIS — N18.3 CHRONIC KIDNEY DISEASE, STAGE 3 (MODERATE): ICD-10-CM

## 2018-11-09 DIAGNOSIS — H26.9 UNSPECIFIED CATARACT: ICD-10-CM

## 2018-11-09 PROCEDURE — 71046 X-RAY EXAM CHEST 2 VIEWS: CPT | Mod: 26

## 2018-11-09 PROCEDURE — 76770 US EXAM ABDO BACK WALL COMP: CPT | Mod: 26

## 2018-11-09 PROCEDURE — 71046 X-RAY EXAM CHEST 2 VIEWS: CPT

## 2018-11-09 PROCEDURE — 76770 US EXAM ABDO BACK WALL COMP: CPT

## 2018-12-03 ENCOUNTER — APPOINTMENT (OUTPATIENT)
Dept: OPHTHALMOLOGY | Facility: CLINIC | Age: 83
End: 2018-12-03
Payer: MEDICARE

## 2018-12-03 PROCEDURE — 92014 COMPRE OPH EXAM EST PT 1/>: CPT

## 2018-12-03 PROCEDURE — 92133 CPTRZD OPH DX IMG PST SGM ON: CPT

## 2019-01-01 NOTE — ASU PATIENT PROFILE, ADULT - HEALTHCARE QUESTIONS, PROFILE
Principal Discharge DX:	Term  delivered by , current hospitalization  Assessment and plan of treatment:	Follow up in office in 2 days
none

## 2019-04-03 ENCOUNTER — APPOINTMENT (OUTPATIENT)
Dept: INTERNAL MEDICINE | Facility: CLINIC | Age: 84
End: 2019-04-03
Payer: MEDICARE

## 2019-04-03 VITALS
SYSTOLIC BLOOD PRESSURE: 140 MMHG | HEIGHT: 67.25 IN | WEIGHT: 117 LBS | DIASTOLIC BLOOD PRESSURE: 80 MMHG | HEART RATE: 92 BPM | OXYGEN SATURATION: 98 % | BODY MASS INDEX: 18.15 KG/M2

## 2019-04-03 PROCEDURE — G0444 DEPRESSION SCREEN ANNUAL: CPT

## 2019-04-03 PROCEDURE — 99204 OFFICE O/P NEW MOD 45 MIN: CPT

## 2019-04-03 NOTE — ASSESSMENT
[FreeTextEntry1] : 83 male with hx oral ca., renal cell ca and hypothyroid here to \[facility]\"" care\par 1.Oral ca: sees surgeon \par still some post surgery sxs\par 2.Renal cell ca: s/p nephrectomy\par see Dr Rodriguez\par 3.??BPH sxs on tamsulosin\par pt report some improvement in stream but no in nocturia. \par advised pt to discuss with \par 4.TSH\par 5.Eyes: needs referral\par 6.hx osteoporosis: pt used to take biphosphonate and prolia..stopped due to concersn re CKD risks and cost\par pt wishes to check dexa \par daily ca and weight bearing\par 7.colon ca screen: pt states he has had ?2-3 in past.\par 8.immunizations: pt reports Pneumovax age 67 ??not sure\par \par

## 2019-04-03 NOTE — HISTORY OF PRESENT ILLNESS
[FreeTextEntry1] : here to establish care. changing his PMD [de-identified] : he has had oral surgery for ca 2017. has persistent numbness left side but has almost no limitations in eating. appetite good. weight stable . follows up with surgeon. 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 drives, cooks, finances. moved to be close to [facility].

## 2019-04-03 NOTE — REVIEW OF SYSTEMS
[Postnasal Drip] : no postnasal drip [Nasal Discharge] : no nasal discharge [Sore Throat] : no sore throat [Hoarseness] : no hoarseness [Dysuria] : no dysuria [Hesitancy] : hesitancy [Nocturia] : nocturia [Hematuria] : no hematuria [Suicidal] : not suicidal [Insomnia] : no insomnia [Depression] : no depression [Negative] : Heme/Lymph [FreeTextEntry4] : numbness left side mouth/tongue

## 2019-04-03 NOTE — COUNSELING
[Activity counseling provided] : activity [Fall prevention counseling provided] : fall prevention  [None] : None [Needs reinforcement, provided] : Patient needs reinforcement on understanding lifestyle changes and  the steps needed to achieve self management goals and reinforcement was provided [ - Annual Depression Screening] : Annual Depression Screening

## 2019-04-03 NOTE — PHYSICAL EXAM
[No Acute Distress] : no acute distress [Well Nourished] : well nourished [Normal Sclera/Conjunctiva] : normal sclera/conjunctiva [EOMI] : extraocular movements intact [Normal Outer Ear/Nose] : the outer ears and nose were normal in appearance [Normal Oropharynx] : the oropharynx was normal [Normal Nasal Mucosa] : the nasal mucosa was normal [Supple] : supple [No Lymphadenopathy] : no lymphadenopathy [No Respiratory Distress] : no respiratory distress  [Clear to Auscultation] : lungs were clear to auscultation bilaterally [Normal Rate] : normal rate  [Regular Rhythm] : with a regular rhythm [Normal S1, S2] : normal S1 and S2 [No Murmur] : no murmur heard [No Edema] : there was no peripheral edema [No Extremity Clubbing/Cyanosis] : no extremity clubbing/cyanosis [Soft] : abdomen soft [Non Tender] : non-tender [Normal Bowel Sounds] : normal bowel sounds [Normal Anterior Cervical Nodes] : no anterior cervical lymphadenopathy [No CVA Tenderness] : no CVA  tenderness [No Spinal Tenderness] : no spinal tenderness [Grossly Normal Strength/Tone] : grossly normal strength/tone [No Rash] : no rash [Normal Gait] : normal gait [No Focal Deficits] : no focal deficits [Normal Affect] : the affect was normal [Alert and Oriented x3] : oriented to person, place, and time [Comprehensive Foot Exam Normal] : Right and left foot were examined and both feet are normal. No ulcers in either foot. Toes are normal and with full ROM.  Normal tactile sensation with monofilament testing throughout both feet

## 2019-04-04 ENCOUNTER — LABORATORY RESULT (OUTPATIENT)
Age: 84
End: 2019-04-04

## 2019-04-04 LAB
25(OH)D3 SERPL-MCNC: 44.2 NG/ML
ALBUMIN SERPL ELPH-MCNC: 4.5 G/DL
ALP BLD-CCNC: 61 U/L
ALT SERPL-CCNC: 12 U/L
ANION GAP SERPL CALC-SCNC: 13 MMOL/L
APPEARANCE: CLEAR
AST SERPL-CCNC: 27 U/L
BACTERIA UR CULT: NORMAL
BACTERIA: NEGATIVE
BASOPHILS # BLD AUTO: 0.04 K/UL
BASOPHILS NFR BLD AUTO: 0.7 %
BILIRUB SERPL-MCNC: 0.5 MG/DL
BILIRUBIN URINE: NEGATIVE
BLOOD URINE: NEGATIVE
BUN SERPL-MCNC: 22 MG/DL
CALCIUM SERPL-MCNC: 9.9 MG/DL
CHLORIDE SERPL-SCNC: 101 MMOL/L
CHOLEST SERPL-MCNC: 152 MG/DL
CHOLEST/HDLC SERPL: 2.1 RATIO
CO2 SERPL-SCNC: 24 MMOL/L
COLOR: NORMAL
CREAT SERPL-MCNC: 1.73 MG/DL
CREAT SPEC-SCNC: 87 MG/DL
EOSINOPHIL # BLD AUTO: 0.04 K/UL
EOSINOPHIL NFR BLD AUTO: 0.7 %
ESTIMATED AVERAGE GLUCOSE: 117 MG/DL
GLUCOSE QUALITATIVE U: NEGATIVE
GLUCOSE SERPL-MCNC: 93 MG/DL
HBA1C MFR BLD HPLC: 5.7 %
HCT VFR BLD CALC: 45.1 %
HDLC SERPL-MCNC: 72 MG/DL
HGB BLD-MCNC: 14.7 G/DL
HYALINE CASTS: 1 /LPF
IMM GRANULOCYTES NFR BLD AUTO: 0.4 %
KETONES URINE: NEGATIVE
LDLC SERPL CALC-MCNC: 53 MG/DL
LEUKOCYTE ESTERASE URINE: NEGATIVE
LYMPHOCYTES # BLD AUTO: 0.63 K/UL
LYMPHOCYTES NFR BLD AUTO: 11.3 %
MAN DIFF?: NORMAL
MCHC RBC-ENTMCNC: 30.2 PG
MCHC RBC-ENTMCNC: 32.6 GM/DL
MCV RBC AUTO: 92.8 FL
MICROALBUMIN 24H UR DL<=1MG/L-MCNC: <1.2 MG/DL
MICROALBUMIN/CREAT 24H UR-RTO: NORMAL MG/G
MICROSCOPIC-UA: NORMAL
MONOCYTES # BLD AUTO: 0.44 K/UL
MONOCYTES NFR BLD AUTO: 7.9 %
NEUTROPHILS # BLD AUTO: 4.39 K/UL
NEUTROPHILS NFR BLD AUTO: 79 %
NITRITE URINE: NEGATIVE
PH URINE: 6
PLATELET # BLD AUTO: 231 K/UL
POTASSIUM SERPL-SCNC: 4.8 MMOL/L
PROT SERPL-MCNC: 7.2 G/DL
PROTEIN URINE: NEGATIVE
PSA SERPL-MCNC: 2.87 NG/ML
RBC # BLD: 4.86 M/UL
RBC # FLD: 13.7 %
RED BLOOD CELLS URINE: 1 /HPF
SODIUM SERPL-SCNC: 138 MMOL/L
SPECIFIC GRAVITY URINE: 1.01
SQUAMOUS EPITHELIAL CELLS: 0 /HPF
T4 SERPL-MCNC: 6.9 UG/DL
TRIGL SERPL-MCNC: 133 MG/DL
TSH SERPL-ACNC: 2.37 UIU/ML
UROBILINOGEN URINE: NORMAL
WBC # FLD AUTO: 5.56 K/UL
WHITE BLOOD CELLS URINE: 1 /HPF

## 2019-05-01 ENCOUNTER — FORM ENCOUNTER (OUTPATIENT)
Age: 84
End: 2019-05-01

## 2019-05-02 ENCOUNTER — APPOINTMENT (OUTPATIENT)
Dept: RADIOLOGY | Facility: IMAGING CENTER | Age: 84
End: 2019-05-02
Payer: MEDICARE

## 2019-05-02 ENCOUNTER — OUTPATIENT (OUTPATIENT)
Dept: OUTPATIENT SERVICES | Facility: HOSPITAL | Age: 84
LOS: 1 days | End: 2019-05-02
Payer: MEDICARE

## 2019-05-02 DIAGNOSIS — M81.0 AGE-RELATED OSTEOPOROSIS WITHOUT CURRENT PATHOLOGICAL FRACTURE: ICD-10-CM

## 2019-05-02 PROCEDURE — 77080 DXA BONE DENSITY AXIAL: CPT | Mod: 26

## 2019-05-02 PROCEDURE — 77080 DXA BONE DENSITY AXIAL: CPT

## 2019-05-03 NOTE — ASU PATIENT PROFILE, ADULT - AS SC BRADEN SENSORY
Airway patent, Nasal mucosa clear. Mouth with normal mucosa. Throat has no vesicles, no oropharyngeal exudates and uvula is midline. (4) no impairment

## 2019-05-08 ENCOUNTER — RESULT REVIEW (OUTPATIENT)
Age: 84
End: 2019-05-08

## 2019-05-28 ENCOUNTER — APPOINTMENT (OUTPATIENT)
Dept: OPHTHALMOLOGY | Facility: CLINIC | Age: 84
End: 2019-05-28
Payer: MEDICARE

## 2019-05-28 DIAGNOSIS — H40.003 PREGLAUCOMA, UNSPECIFIED, BILATERAL: ICD-10-CM

## 2019-05-28 PROCEDURE — 92133 CPTRZD OPH DX IMG PST SGM ON: CPT

## 2019-05-28 PROCEDURE — ZZZZZ: CPT

## 2019-05-28 PROCEDURE — 92012 INTRM OPH EXAM EST PATIENT: CPT

## 2019-05-28 PROCEDURE — 92083 EXTENDED VISUAL FIELD XM: CPT

## 2019-07-01 ENCOUNTER — APPOINTMENT (OUTPATIENT)
Dept: ENDOCRINOLOGY | Facility: CLINIC | Age: 84
End: 2019-07-01
Payer: MEDICARE

## 2019-07-01 VITALS
OXYGEN SATURATION: 98 % | DIASTOLIC BLOOD PRESSURE: 70 MMHG | WEIGHT: 122 LBS | HEIGHT: 67.25 IN | HEART RATE: 59 BPM | SYSTOLIC BLOOD PRESSURE: 110 MMHG | BODY MASS INDEX: 18.93 KG/M2

## 2019-07-01 PROCEDURE — 99204 OFFICE O/P NEW MOD 45 MIN: CPT

## 2019-07-01 NOTE — CONSULT LETTER
[Dear  ___] : Dear  [unfilled], [Consult Letter:] : I had the pleasure of evaluating your patient, [unfilled]. [Please see my note below.] : Please see my note below. [Consult Closing:] : Thank you very much for allowing me to participate in the care of this patient.  If you have any questions, please do not hesitate to contact me. [Sincerely,] : Sincerely, [FreeTextEntry3] : Cailin Lizarraga MD

## 2019-07-01 NOTE — PHYSICAL EXAM
[Alert] : alert [No Acute Distress] : no acute distress [Well Nourished] : well nourished [Well Developed] : well developed [EOMI] : extra ocular movement intact [Normal Hearing] : hearing was normal [Thyroid Not Enlarged] : the thyroid was not enlarged [No Respiratory Distress] : no respiratory distress [Normal Rate and Effort] : normal respiratory rhythm and effort [No Accessory Muscle Use] : no accessory muscle use [Clear to Auscultation] : lungs were clear to auscultation bilaterally [Normal Rate] : heart rate was normal  [Normal S1, S2] : normal S1 and S2 [Regular Rhythm] : with a regular rhythm [Normal Bowel Sounds] : normal bowel sounds [Not Tender] : non-tender [Soft] : abdomen soft [Not Distended] : not distended [No Stigmata of Cushings Syndrome] : no stigmata of cushings syndrome [Normal Gait] : normal gait [No Joint Swelling] : no joint swelling seen [Normal Strength/Tone] : muscle strength and tone were normal [No Motor Deficits] : the motor exam was normal [No Tremors] : no tremors [Normal Insight/Judgement] : insight and judgment were intact [Normal Affect] : the affect was normal [Normal Mood] : the mood was normal [de-identified] : BMI 18

## 2019-07-01 NOTE — REASON FOR VISIT
[Initial Eval - Existing Diagnosis] : an initial evaluation of an existing diagnosis [FreeTextEntry1] : Osteoporosis

## 2019-07-01 NOTE — HISTORY OF PRESENT ILLNESS
[Calcium (dietary)] : calcium from their regular diet [Vitamin D (supplements)] : Vitamin D as a dietary supplement [FreeTextEntry1] : Patient is a 82 yo man establishing care with osteoporosis\par \par Patient was diagnosed with osteoporosis in 2015 based on DXA.  Was started on Fosamax at the time of diagnosis and was on it for one year.  Previous PCP Dr. Keene stopped the Fosamax due to GFR.  The patient also did not want to take medicines because his friend, who is a doctor, told him the medicines do not work.\par Patient with left shoulder fracture 1992 after a slip and fall\par As a boy, had a right arm fracture while playing soccer\par Takes calcium 600 mg daily and vitamin D\par Does not drink milk, no fish\par History of right renal cancer\par History of oral cancer had tongue lesion resection.  Had recurrence in 2017 and had repeat surgical excision\par Last dental visit: last year, no plans for major dental work\par Had prolia injection once but stopped due to financial limitations\par \par Hypothyroidism diagnosed for the last several years.  Adherent to Lt4 50 mcg daily.  Takes daily on empty stomach [Taking Steroids] : no past or present history of taking steroids [Kidney Stones] : no history of kidney stones [Excessive Alcohol Intake] : no past or present history of excessive alcohol intake [Current Smoking___(ppd)] : not currently smoking [High Fall Risk] : no fall risk [Frequent Falls] : no frequent falls [Family History of Osteoporosis] : no family history of osteoporosis [Family History of Breast Cancer] : no family history of breast cancer [Family History of Hip Fracture] : no family history of hip fracture [Hyperparathyroidism] : no hyperparathyroidism [History of Radiation Therapy] : no history of radiation therapy [Previous Fragility Fracture] : no previous fragility fracture [de-identified] : Former smoker

## 2019-07-01 NOTE — REVIEW OF SYSTEMS
[Fatigue] : fatigue [Negative] : Constitutional [All other systems negative] : All other systems negative [Decreased Appetite] : appetite not decreased [Visual Field Defect] : no visual field defect [Blurry Vision] : no blurred vision [Dysphagia] : no dysphagia [Dysphonia] : no dysphonia [Chest Pain] : no chest pain [Palpitations] : no palpitations [Heart Rate Is Slow] : the heart rate was not slow [Heart Rate Is Fast] : the heart rate was not fast [Joint Pain] : no joint pain

## 2019-07-01 NOTE — ASSESSMENT
[FreeTextEntry1] : Patient is an 84 yo man with CKD, hx of renal cancer/oral cancer, hypothyroidism and osteoporosis establishing endocrine care for osteoporosis\par \par 1. Osteoporosis\par -patient has osteoporosis with T score of -2.6 in the spine and -2.5 in the femoral neck. Previously on oral bisphosphonate but it was stopped due to CKD and low GFR\par -the patient has not had any clinical fractures\par -discussed treatment option is Prolia at  this time.  He is not a candidate for bisphosphonate due to renal function and PTH analogs in the setting of previous cancers \par -patient does not want to be on medicine as he has heard from his friend that it is not effective. Educated about fracture risk\par -he also believes he needs major dental work done (i.e. extractions and possible implants). He can proceed with dental work at this time and prolia can always be started after healing\par -at this point, he opts to think about treatment for the future\par -fall precautions discussed at length\par -BMI is very low and suspect his nutrition is severely restricted due to CKD.  Consider renal referral and nutrition consultation\par \par 2. Hypothyroid\par -clinically and chemically euthyroid\par -continue current dose of levothyroxine 50 mcg daily\par \par 3. Prediabetes\par -A1c is stable\par \par Follow up in four months to discuss osteoporosis treatment, sooner if a decision is made to start prolia [Denosumab Therapy] : Risks  and benefits of denosumab therapy were discussed with the patient including eczema, cellulitis, osteonecrosis of the jaw and atypical femur fractures [Levothyroxine] : The patient was instructed to take Levothyroxine on an empty stomach, separate from vitamins, and wait at least 30 minutes before eating

## 2019-09-20 ENCOUNTER — APPOINTMENT (OUTPATIENT)
Dept: INTERNAL MEDICINE | Facility: CLINIC | Age: 84
End: 2019-09-20
Payer: MEDICARE

## 2019-09-20 VITALS
OXYGEN SATURATION: 99 % | DIASTOLIC BLOOD PRESSURE: 88 MMHG | SYSTOLIC BLOOD PRESSURE: 122 MMHG | HEART RATE: 78 BPM | WEIGHT: 120 LBS | BODY MASS INDEX: 18.65 KG/M2

## 2019-09-20 PROCEDURE — G0008: CPT

## 2019-09-20 PROCEDURE — 99215 OFFICE O/P EST HI 40 MIN: CPT | Mod: 25

## 2019-09-20 PROCEDURE — 90662 IIV NO PRSV INCREASED AG IM: CPT

## 2019-09-23 ENCOUNTER — MEDICATION RENEWAL (OUTPATIENT)
Age: 84
End: 2019-09-23

## 2019-09-23 NOTE — PHYSICAL EXAM
[No Acute Distress] : no acute distress [Well Nourished] : well nourished [Well-Appearing] : well-appearing [Well Developed] : well developed [Normal Sclera/Conjunctiva] : normal sclera/conjunctiva [PERRL] : pupils equal round and reactive to light [EOMI] : extraocular movements intact [Normal Outer Ear/Nose] : the outer ears and nose were normal in appearance [Normal Oropharynx] : the oropharynx was normal [No JVD] : no jugular venous distention [No Lymphadenopathy] : no lymphadenopathy [Supple] : supple [Thyroid Normal, No Nodules] : the thyroid was normal and there were no nodules present [No Respiratory Distress] : no respiratory distress  [Clear to Auscultation] : lungs were clear to auscultation bilaterally [No Accessory Muscle Use] : no accessory muscle use [Regular Rhythm] : with a regular rhythm [Normal Rate] : normal rate  [Normal S1, S2] : normal S1 and S2 [No Carotid Bruits] : no carotid bruits [No Murmur] : no murmur heard [No Abdominal Bruit] : a ~M bruit was not heard ~T in the abdomen [Pedal Pulses Present] : the pedal pulses are present [No Varicosities] : no varicosities [No Palpable Aorta] : no palpable aorta [No Edema] : there was no peripheral edema [No Extremity Clubbing/Cyanosis] : no extremity clubbing/cyanosis [Normal Appearance] : normal in appearance [No Axillary Lymphadenopathy] : no axillary lymphadenopathy [Soft] : abdomen soft [Non Tender] : non-tender [Non-distended] : non-distended [No Masses] : no abdominal mass palpated [No HSM] : no HSM [Normal Bowel Sounds] : normal bowel sounds [Normal Posterior Cervical Nodes] : no posterior cervical lymphadenopathy [Normal Anterior Cervical Nodes] : no anterior cervical lymphadenopathy [No CVA Tenderness] : no CVA  tenderness [No Spinal Tenderness] : no spinal tenderness [No Joint Swelling] : no joint swelling [Grossly Normal Strength/Tone] : grossly normal strength/tone [No Rash] : no rash [No Focal Deficits] : no focal deficits [Coordination Grossly Intact] : coordination grossly intact [Deep Tendon Reflexes (DTR)] : deep tendon reflexes were 2+ and symmetric [Normal Gait] : normal gait [Normal Affect] : the affect was normal [Normal Insight/Judgement] : insight and judgment were intact

## 2019-09-23 NOTE — HISTORY OF PRESENT ILLNESS
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

## 2019-09-30 NOTE — BRIEF OPERATIVE NOTE - SURGICAL START DATE/TIME
08-Feb-2017 Quality 130: Documentation Of Current Medications In The Medical Record: Current Medications Documented Detail Level: Zone Quality 431: Preventive Care And Screening: Unhealthy Alcohol Use - Screening: Patient screened for unhealthy alcohol use using a single question and scores less than 2 times per year Quality 226: Preventive Care And Screening: Tobacco Use: Screening And Cessation Intervention: Tobacco Screening not Performed for Medical Reasons

## 2019-11-12 ENCOUNTER — APPOINTMENT (OUTPATIENT)
Dept: ENDOCRINOLOGY | Facility: CLINIC | Age: 84
End: 2019-11-12

## 2019-11-14 ENCOUNTER — APPOINTMENT (OUTPATIENT)
Dept: UROLOGY | Facility: CLINIC | Age: 84
End: 2019-11-14
Payer: MEDICARE

## 2019-11-14 VITALS
HEART RATE: 58 BPM | TEMPERATURE: 98.8 F | SYSTOLIC BLOOD PRESSURE: 148 MMHG | RESPIRATION RATE: 16 BRPM | DIASTOLIC BLOOD PRESSURE: 83 MMHG

## 2019-11-14 PROCEDURE — 99213 OFFICE O/P EST LOW 20 MIN: CPT

## 2019-11-14 NOTE — LETTER BODY
[FreeTextEntry1] : Mirna Johnson MD\par [address]\[place] [address]\par [phone] (W)\par \par \par Hiram Webster MD\par [address]\[place] [address]\par [phone]\par [phone]\par \par Dear Dr. Johnson and Dr. Rubinstein,\par \par Reason for Visit: Previous right renal cell carcinoma. BPH. \par \par This is a 83 year-old gentleman with chronic BPH and previous renal cell carcinoma status post right radical nephrectomy 5 years ago. His previous chest x-ray in 2015 showed no acute pulmonary infiltrate. Repeat chest x-ray in 2017 showed no change, no acute pulmonary disease visualized. His renal ultrasound in 2017 showed no recurrence of malignancy. The patient returns for follow up. Since the patient's last visit, he sprained his foot and is using a [employer] knee scooter to transport. He reports he continues to take Flomax regularly without any side effects or difficulties. He reports of stable uroflow with medication. However, patient continues to complain of nocturia 3-4x per night. Patient has osteoporosis and reports his weight has been stable. He reports a source of stress in his life is taking care of his 84 year-old wife. Patient denies any changes in health. Patient denies any hematuria or dysuria. The past medical history, family history and social history are unchanged. All other review of systems are negative. Patient denies any changes in medications. Medication list was reconciled. He is planning a cruise trip soon.\par \par On examination, the patient is an elderly appearing gentleman in no acute distress. He is alert and oriented follows commands. He has normal mood and affect. He is normocephalic. Neck is supple. Respirations are unlabored. His abdomen is soft and nontender. Bladder is nonpalpable. No CVA tenderness. Neurologically he is grossly intact. No peripheral edema. Skin without gross abnormality. \par \par His ultrasound from October 2017 demonstrated right renal fossa is unremarkable s/p right nephrectomy. His CXR report demonstrated hyperinflated lungs. No acute pulmonary disease. \par \par His BMP demonstrated decreased renal functions, creatinine 1.73. His PSA was 2.87, which is within normal limits. \par \par Assessment: Previous renal cell carcinoma. BPH, with nocturia.\par \par I counseled the patient. He is doing well 6 years out of surgery. His creatinine is elevated, but stable. His previous ultrasound and chest x-ray in 2017 revealed no recurrence of malignancy. I advise him to obtain renal ultrasound today to ensure stability. In terms of his BPH, I renewed his prescription for Flomax today. I encouraged him to continue Flomax as needed. In terms of his weight loss due to his osteoporosis, I recommend he begin drinking Ensure. Risks and alternatives were discussed. I answered the patient's questions. The patient will follow up as directed and will contact me with any questions or concerns. Thank you for the opportunity to participate in the care of Mr. MENDOZA. I will keep you updated on his progress. \par \par Plan: Continue Flomax. Renal ultrasound. Follow up in 1 year.

## 2019-11-14 NOTE — ADDENDUM
[FreeTextEntry1] : Entered by Jh Farias, acting as scribe for Dr. Forrest Rodriguez.\par \par The documentation recorded by the scribe accurately reflects the service I personally performed and the decisions made by me.

## 2019-12-12 ENCOUNTER — APPOINTMENT (OUTPATIENT)
Dept: OPHTHALMOLOGY | Facility: CLINIC | Age: 84
End: 2019-12-12
Payer: MEDICARE

## 2019-12-12 ENCOUNTER — NON-APPOINTMENT (OUTPATIENT)
Age: 84
End: 2019-12-12

## 2019-12-12 PROCEDURE — 76514 ECHO EXAM OF EYE THICKNESS: CPT

## 2019-12-12 PROCEDURE — 92014 COMPRE OPH EXAM EST PT 1/>: CPT

## 2019-12-18 ENCOUNTER — FORM ENCOUNTER (OUTPATIENT)
Age: 84
End: 2019-12-18

## 2019-12-19 ENCOUNTER — APPOINTMENT (OUTPATIENT)
Dept: ULTRASOUND IMAGING | Facility: IMAGING CENTER | Age: 84
End: 2019-12-19
Payer: MEDICARE

## 2019-12-19 ENCOUNTER — OUTPATIENT (OUTPATIENT)
Dept: OUTPATIENT SERVICES | Facility: HOSPITAL | Age: 84
LOS: 1 days | End: 2019-12-19
Payer: MEDICARE

## 2019-12-19 DIAGNOSIS — R93.429 ABNORMAL RADIOLOGIC FINDINGS ON DIAGNOSTIC IMAGING OF UNSPECIFIED KIDNEY: ICD-10-CM

## 2019-12-19 PROCEDURE — 76770 US EXAM ABDO BACK WALL COMP: CPT | Mod: 26

## 2019-12-19 PROCEDURE — 76770 US EXAM ABDO BACK WALL COMP: CPT

## 2019-12-23 ENCOUNTER — RX RENEWAL (OUTPATIENT)
Age: 84
End: 2019-12-23

## 2020-02-05 ENCOUNTER — APPOINTMENT (OUTPATIENT)
Dept: MRI IMAGING | Facility: IMAGING CENTER | Age: 85
End: 2020-02-05

## 2020-02-05 ENCOUNTER — OUTPATIENT (OUTPATIENT)
Dept: OUTPATIENT SERVICES | Facility: HOSPITAL | Age: 85
LOS: 1 days | End: 2020-02-05

## 2020-02-05 DIAGNOSIS — Z00.8 ENCOUNTER FOR OTHER GENERAL EXAMINATION: ICD-10-CM

## 2020-02-13 ENCOUNTER — APPOINTMENT (OUTPATIENT)
Dept: SURGERY | Facility: CLINIC | Age: 85
End: 2020-02-13
Payer: MEDICARE

## 2020-02-13 PROCEDURE — 99213 OFFICE O/P EST LOW 20 MIN: CPT

## 2020-02-13 NOTE — ASSESSMENT
[FreeTextEntry1] : will observe. numbness unlikely to improve at this point . to return earlier if any change.  recommended checking temperature of food prior to eating to avoid trauma

## 2020-02-13 NOTE — PHYSICAL EXAM
[de-identified] : no palpable thyroid nodules [Midline] : located in midline position [Laryngoscopy Performed] : laryngoscopy was performed, see procedure section for findings [de-identified] : tongue operative site well healed.  no palpable masses [Normal] : orientation to person, place, and time: normal

## 2020-02-13 NOTE — HISTORY OF PRESENT ILLNESS
[de-identified] : 36  months s/p partial glossectomy for malignancy. denies  bleeding, dysphagia, hoarseness or new lesions. left tongue numbness persists.

## 2020-03-02 ENCOUNTER — NON-APPOINTMENT (OUTPATIENT)
Age: 85
End: 2020-03-02

## 2020-03-02 ENCOUNTER — APPOINTMENT (OUTPATIENT)
Dept: OPHTHALMOLOGY | Facility: CLINIC | Age: 85
End: 2020-03-02
Payer: MEDICARE

## 2020-03-02 PROCEDURE — 92012 INTRM OPH EXAM EST PATIENT: CPT

## 2020-03-03 ENCOUNTER — APPOINTMENT (OUTPATIENT)
Dept: CT IMAGING | Facility: IMAGING CENTER | Age: 85
End: 2020-03-03

## 2020-03-05 ENCOUNTER — APPOINTMENT (OUTPATIENT)
Dept: CT IMAGING | Facility: IMAGING CENTER | Age: 85
End: 2020-03-05
Payer: MEDICARE

## 2020-03-05 ENCOUNTER — OUTPATIENT (OUTPATIENT)
Dept: OUTPATIENT SERVICES | Facility: HOSPITAL | Age: 85
LOS: 1 days | End: 2020-03-05
Payer: MEDICARE

## 2020-03-05 DIAGNOSIS — Z00.8 ENCOUNTER FOR OTHER GENERAL EXAMINATION: ICD-10-CM

## 2020-03-05 PROCEDURE — 70450 CT HEAD/BRAIN W/O DYE: CPT

## 2020-03-05 PROCEDURE — 70480 CT ORBIT/EAR/FOSSA W/O DYE: CPT | Mod: 26,59

## 2020-03-05 PROCEDURE — 70480 CT ORBIT/EAR/FOSSA W/O DYE: CPT

## 2020-03-05 PROCEDURE — 70450 CT HEAD/BRAIN W/O DYE: CPT | Mod: 26

## 2020-03-06 ENCOUNTER — APPOINTMENT (OUTPATIENT)
Dept: OPHTHALMOLOGY | Facility: CLINIC | Age: 85
End: 2020-03-06

## 2020-03-12 ENCOUNTER — APPOINTMENT (OUTPATIENT)
Dept: OPHTHALMOLOGY | Facility: CLINIC | Age: 85
End: 2020-03-12
Payer: MEDICARE

## 2020-03-12 ENCOUNTER — NON-APPOINTMENT (OUTPATIENT)
Age: 85
End: 2020-03-12

## 2020-03-12 PROCEDURE — 92012 INTRM OPH EXAM EST PATIENT: CPT

## 2020-04-06 ENCOUNTER — APPOINTMENT (OUTPATIENT)
Dept: INTERNAL MEDICINE | Facility: CLINIC | Age: 85
End: 2020-04-06

## 2020-04-16 ENCOUNTER — APPOINTMENT (OUTPATIENT)
Dept: OPHTHALMOLOGY | Facility: CLINIC | Age: 85
End: 2020-04-16

## 2020-06-25 ENCOUNTER — APPOINTMENT (OUTPATIENT)
Dept: SURGERY | Facility: CLINIC | Age: 85
End: 2020-06-25

## 2020-10-13 ENCOUNTER — RX RENEWAL (OUTPATIENT)
Age: 85
End: 2020-10-13

## 2020-11-02 ENCOUNTER — RX RENEWAL (OUTPATIENT)
Age: 85
End: 2020-11-02

## 2020-11-05 ENCOUNTER — APPOINTMENT (OUTPATIENT)
Dept: FAMILY MEDICINE | Facility: CLINIC | Age: 85
End: 2020-11-05
Payer: MEDICARE

## 2020-11-05 VITALS — SYSTOLIC BLOOD PRESSURE: 150 MMHG | DIASTOLIC BLOOD PRESSURE: 90 MMHG

## 2020-11-05 VITALS
BODY MASS INDEX: 18.36 KG/M2 | HEART RATE: 82 BPM | DIASTOLIC BLOOD PRESSURE: 90 MMHG | TEMPERATURE: 97.5 F | HEIGHT: 67 IN | SYSTOLIC BLOOD PRESSURE: 163 MMHG | WEIGHT: 117 LBS | OXYGEN SATURATION: 99 %

## 2020-11-05 PROCEDURE — 36415 COLL VENOUS BLD VENIPUNCTURE: CPT

## 2020-11-05 PROCEDURE — 99072 ADDL SUPL MATRL&STAF TM PHE: CPT

## 2020-11-05 PROCEDURE — 99214 OFFICE O/P EST MOD 30 MIN: CPT | Mod: 25

## 2020-11-05 NOTE — HISTORY OF PRESENT ILLNESS
[FreeTextEntry1] : establish care [de-identified] : 83 yo M with hx osteoporosis, tongue cancer, renal cell cancer, hypothyroidism, BPH, CKD presents to establish care. He is transitioning care from different [facility] PCP.\par \par Pt gets easily anxious. He is also taking care of his wife who has worsening Alzheimer's dementia. She is to the point she needs 24 hr care.

## 2020-11-06 LAB
25(OH)D3 SERPL-MCNC: 53.5 NG/ML
ALBUMIN SERPL ELPH-MCNC: 4.7 G/DL
ALP BLD-CCNC: 69 U/L
ALT SERPL-CCNC: 13 U/L
ANION GAP SERPL CALC-SCNC: 12 MMOL/L
AST SERPL-CCNC: 30 U/L
BILIRUB SERPL-MCNC: 0.5 MG/DL
BUN SERPL-MCNC: 27 MG/DL
CALCIUM SERPL-MCNC: 9.9 MG/DL
CHLORIDE SERPL-SCNC: 100 MMOL/L
CO2 SERPL-SCNC: 24 MMOL/L
CREAT SERPL-MCNC: 1.67 MG/DL
GLUCOSE SERPL-MCNC: 112 MG/DL
POTASSIUM SERPL-SCNC: 5.5 MMOL/L
PROT SERPL-MCNC: 7.3 G/DL
SODIUM SERPL-SCNC: 136 MMOL/L
T4 FREE SERPL-MCNC: 1.3 NG/DL
TSH SERPL-ACNC: 6.58 UIU/ML

## 2020-12-16 PROBLEM — Z87.09 HISTORY OF INFLUENZA: Status: RESOLVED | Noted: 2018-02-23 | Resolved: 2020-12-16

## 2021-03-30 DIAGNOSIS — Z12.5 ENCOUNTER FOR SCREENING FOR MALIGNANT NEOPLASM OF PROSTATE: ICD-10-CM

## 2021-03-31 ENCOUNTER — NON-APPOINTMENT (OUTPATIENT)
Age: 86
End: 2021-03-31

## 2021-04-02 ENCOUNTER — APPOINTMENT (OUTPATIENT)
Dept: FAMILY MEDICINE | Facility: CLINIC | Age: 86
End: 2021-04-02
Payer: MEDICARE

## 2021-04-02 PROCEDURE — ZZZZZ: CPT

## 2021-04-04 ENCOUNTER — TRANSCRIPTION ENCOUNTER (OUTPATIENT)
Age: 86
End: 2021-04-04

## 2021-04-06 ENCOUNTER — NON-APPOINTMENT (OUTPATIENT)
Age: 86
End: 2021-04-06

## 2021-04-06 ENCOUNTER — APPOINTMENT (OUTPATIENT)
Dept: FAMILY MEDICINE | Facility: CLINIC | Age: 86
End: 2021-04-06
Payer: MEDICARE

## 2021-04-06 VITALS
DIASTOLIC BLOOD PRESSURE: 77 MMHG | WEIGHT: 114 LBS | SYSTOLIC BLOOD PRESSURE: 155 MMHG | HEIGHT: 67 IN | HEART RATE: 65 BPM | OXYGEN SATURATION: 97 % | TEMPERATURE: 98.2 F | BODY MASS INDEX: 17.89 KG/M2

## 2021-04-06 VITALS — SYSTOLIC BLOOD PRESSURE: 130 MMHG | DIASTOLIC BLOOD PRESSURE: 74 MMHG

## 2021-04-06 LAB
25(OH)D3 SERPL-MCNC: 56.4 NG/ML
ALBUMIN SERPL ELPH-MCNC: 4.6 G/DL
ALP BLD-CCNC: 60 U/L
ALT SERPL-CCNC: 13 U/L
ANION GAP SERPL CALC-SCNC: 13 MMOL/L
APPEARANCE: CLEAR
AST SERPL-CCNC: 28 U/L
BASOPHILS # BLD AUTO: 0.04 K/UL
BASOPHILS NFR BLD AUTO: 0.9 %
BILIRUB SERPL-MCNC: 0.6 MG/DL
BILIRUBIN URINE: NEGATIVE
BLOOD URINE: NEGATIVE
BUN SERPL-MCNC: 23 MG/DL
CALCIUM SERPL-MCNC: 9.6 MG/DL
CHLORIDE SERPL-SCNC: 92 MMOL/L
CHOLEST SERPL-MCNC: 134 MG/DL
CO2 SERPL-SCNC: 23 MMOL/L
COLOR: NORMAL
CREAT SERPL-MCNC: 1.67 MG/DL
EOSINOPHIL # BLD AUTO: 0.05 K/UL
EOSINOPHIL NFR BLD AUTO: 1.2 %
ESTIMATED AVERAGE GLUCOSE: 114 MG/DL
GLUCOSE QUALITATIVE U: NEGATIVE
GLUCOSE SERPL-MCNC: 96 MG/DL
HBA1C MFR BLD HPLC: 5.6 %
HCT VFR BLD CALC: 43.5 %
HDLC SERPL-MCNC: 73 MG/DL
HGB BLD-MCNC: 14.4 G/DL
IMM GRANULOCYTES NFR BLD AUTO: 0.2 %
KETONES URINE: NEGATIVE
LDLC SERPL CALC-MCNC: 45 MG/DL
LEUKOCYTE ESTERASE URINE: NEGATIVE
LYMPHOCYTES # BLD AUTO: 0.79 K/UL
LYMPHOCYTES NFR BLD AUTO: 18.3 %
MAN DIFF?: NORMAL
MCHC RBC-ENTMCNC: 30.4 PG
MCHC RBC-ENTMCNC: 33.1 GM/DL
MCV RBC AUTO: 92 FL
MONOCYTES # BLD AUTO: 0.52 K/UL
MONOCYTES NFR BLD AUTO: 12 %
NEUTROPHILS # BLD AUTO: 2.91 K/UL
NEUTROPHILS NFR BLD AUTO: 67.4 %
NITRITE URINE: NEGATIVE
NONHDLC SERPL-MCNC: 61 MG/DL
PH URINE: 6
PLATELET # BLD AUTO: 271 K/UL
POTASSIUM SERPL-SCNC: 4.9 MMOL/L
PROT SERPL-MCNC: 6.9 G/DL
PROTEIN URINE: NORMAL
PSA SERPL-MCNC: 2.68 NG/ML
RBC # BLD: 4.73 M/UL
RBC # FLD: 12.9 %
SODIUM SERPL-SCNC: 128 MMOL/L
SPECIFIC GRAVITY URINE: 1.02
T4 FREE SERPL-MCNC: 2 NG/DL
TRIGL SERPL-MCNC: 82 MG/DL
TSH SERPL-ACNC: 1.78 UIU/ML
UROBILINOGEN URINE: NORMAL
WBC # FLD AUTO: 4.32 K/UL

## 2021-04-06 PROCEDURE — 93000 ELECTROCARDIOGRAM COMPLETE: CPT

## 2021-04-06 PROCEDURE — 99072 ADDL SUPL MATRL&STAF TM PHE: CPT

## 2021-04-06 PROCEDURE — G0439: CPT

## 2021-04-06 NOTE — PHYSICAL EXAM
[Normal] : affect was normal and insight and judgment were intact [de-identified] : moderately sized ulcer on left side of tongue. No erythema. [de-identified] : left submandibular LN noted.

## 2021-04-06 NOTE — HISTORY OF PRESENT ILLNESS
[de-identified] : 84 yo M with hx kidney cancer, oral cancer, HTN presents for annual. Had blood work done few days ago. Was noted to have hyponatremia on labs. Pt denies any symptoms. Likely from too much water intake.\par \par Pt reports having had more water intake lately due to dental problems. Has been having mostly liquid or soft diet. Pt due to CKD, does not eat much salt, potassium, phosphorus, protein foods.\par \par BPs at home-- 106/70-120s/80s. Does notice with tamsulosin, BP gets lowered. Does urinate better with tamsulosin. \par \par has mouth ulcer that has been there for 2 months, nonpainful like the other canker sores. Has ENT appt tomorrow. Hx squamous cell ca.\par \par Completed pfizer vaccines-- 3/3/21 and 3/23/21.

## 2021-04-07 ENCOUNTER — LABORATORY RESULT (OUTPATIENT)
Age: 86
End: 2021-04-07

## 2021-04-07 ENCOUNTER — APPOINTMENT (OUTPATIENT)
Dept: OTOLARYNGOLOGY | Facility: CLINIC | Age: 86
End: 2021-04-07
Payer: MEDICARE

## 2021-04-07 VITALS
BODY MASS INDEX: 17.89 KG/M2 | HEART RATE: 73 BPM | DIASTOLIC BLOOD PRESSURE: 86 MMHG | OXYGEN SATURATION: 97 % | TEMPERATURE: 98 F | WEIGHT: 114 LBS | HEIGHT: 67 IN | SYSTOLIC BLOOD PRESSURE: 136 MMHG

## 2021-04-07 DIAGNOSIS — Z11.9 ENCOUNTER FOR SCREENING FOR INFECTIOUS AND PARASITIC DISEASES, UNSPECIFIED: ICD-10-CM

## 2021-04-07 DIAGNOSIS — F41.8 OTHER SPECIFIED ANXIETY DISORDERS: ICD-10-CM

## 2021-04-07 PROCEDURE — 99203 OFFICE O/P NEW LOW 30 MIN: CPT

## 2021-04-07 PROCEDURE — 99072 ADDL SUPL MATRL&STAF TM PHE: CPT

## 2021-04-12 PROBLEM — F41.8 SITUATIONAL ANXIETY: Status: ACTIVE | Noted: 2021-04-12

## 2021-04-12 NOTE — PROCEDURE
[FreeTextEntry1] : left tongue bx [FreeTextEntry3] : After the consent, 3 cc of 1% lidocaine with [id number] epinephrine was used to infiltrate the biopsy site. Then using a 4 punch was used to obtain incisional bx from the lesion. \par The specimen was kept in formalin based solution, labelled and sent to pathology. After good pressure hemostasis and 20min observation the pt. was discharged.\par \par

## 2021-04-12 NOTE — REASON FOR VISIT
[Family Member] : family member [Initial Evaluation] : an initial evaluation for [FreeTextEntry2] : tongue lesion

## 2021-04-12 NOTE — PHYSICAL EXAM
[Normal] : orientation to person, place, and time: normal [de-identified] : left lat. tongue ulcerated 1.5cm lesion, non bleeding, not crossing midline.

## 2021-04-12 NOTE — ASSESSMENT
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 oral tongue lesion. \par Bx was performed today in office.\par \par We thoroughly discussed the diagnosis with the patient, his daughter and her  and explained the required workup, f/u and treatment options. we discussed dd and included option of field cancerization. \par \par Plan:\par - f/u with path . result.\par - PETCT. \par - based on bx result plan for or left partial glossectomy possible neck dissection.\par - Avoid sports/strenuous activity for the next 3 days. If the bx site bleeds- put a gauze over it and press for couple of minutes, chew ice chips, if bleeding continous call 911/approach ED.\par - RTC sooner should any worrisome symptoms develop.\par - Pt verbalized understanding of above.\par

## 2021-04-12 NOTE — HISTORY OF PRESENT ILLNESS
[de-identified] : 86yo M, presented with left tongue lesionX 2 months. He underwent left partial glossectomyX2 (2004, 2017). 2004 path c/w scc, 2017 high grade dysplasia. ND wasn't performed. no adjuvant tx was given. He has a remote >35y ago hx of smoking, denies EtOH. he denies swallowing/voice/breathing complains, denies WL/NS/f. \par he underwent right nephrectomy for renal scc, and report he can't do CT+Contrast.\par

## 2021-04-16 PROBLEM — Z11.9 SCREENING EXAMINATION FOR INFECTIOUS DISEASE: Status: ACTIVE | Noted: 2021-04-16

## 2021-04-19 ENCOUNTER — APPOINTMENT (OUTPATIENT)
Dept: NUCLEAR MEDICINE | Facility: IMAGING CENTER | Age: 86
End: 2021-04-19
Payer: MEDICARE

## 2021-04-19 ENCOUNTER — OUTPATIENT (OUTPATIENT)
Dept: OUTPATIENT SERVICES | Facility: HOSPITAL | Age: 86
LOS: 1 days | End: 2021-04-19
Payer: MEDICARE

## 2021-04-19 ENCOUNTER — RESULT REVIEW (OUTPATIENT)
Age: 86
End: 2021-04-19

## 2021-04-19 DIAGNOSIS — C76.0 MALIGNANT NEOPLASM OF HEAD, FACE AND NECK: ICD-10-CM

## 2021-04-19 PROCEDURE — 78815 PET IMAGE W/CT SKULL-THIGH: CPT | Mod: 26,PI

## 2021-04-19 PROCEDURE — A9552: CPT

## 2021-04-19 PROCEDURE — 78815 PET IMAGE W/CT SKULL-THIGH: CPT

## 2021-04-26 ENCOUNTER — APPOINTMENT (OUTPATIENT)
Dept: FAMILY MEDICINE | Facility: CLINIC | Age: 86
End: 2021-04-26
Payer: MEDICARE

## 2021-04-26 VITALS — DIASTOLIC BLOOD PRESSURE: 80 MMHG | SYSTOLIC BLOOD PRESSURE: 134 MMHG

## 2021-04-26 VITALS
HEART RATE: 76 BPM | OXYGEN SATURATION: 98 % | SYSTOLIC BLOOD PRESSURE: 153 MMHG | BODY MASS INDEX: 17.58 KG/M2 | DIASTOLIC BLOOD PRESSURE: 89 MMHG | WEIGHT: 112 LBS | TEMPERATURE: 97.2 F | HEIGHT: 67 IN

## 2021-04-26 PROCEDURE — 99072 ADDL SUPL MATRL&STAF TM PHE: CPT

## 2021-04-26 PROCEDURE — 99213 OFFICE O/P EST LOW 20 MIN: CPT | Mod: 25

## 2021-04-27 LAB
ALBUMIN SERPL ELPH-MCNC: 4.3 G/DL
ALP BLD-CCNC: 59 U/L
ALT SERPL-CCNC: 12 U/L
ANION GAP SERPL CALC-SCNC: 10 MMOL/L
APTT BLD: 32.2 SEC
AST SERPL-CCNC: 27 U/L
BILIRUB SERPL-MCNC: 0.2 MG/DL
BUN SERPL-MCNC: 32 MG/DL
CALCIUM SERPL-MCNC: 9.6 MG/DL
CHLORIDE SERPL-SCNC: 100 MMOL/L
CO2 SERPL-SCNC: 23 MMOL/L
CREAT SERPL-MCNC: 1.71 MG/DL
GLUCOSE SERPL-MCNC: 84 MG/DL
INR PPP: 0.88 RATIO
POTASSIUM SERPL-SCNC: 5.3 MMOL/L
PROT SERPL-MCNC: 6.7 G/DL
PT BLD: 10.5 SEC
SODIUM SERPL-SCNC: 132 MMOL/L

## 2021-04-27 NOTE — ADDENDUM
[FreeTextEntry1] : Pt with hyponatremia, somewhat improved. Asymptomatic. Not contraindication for surgery. Pt cleared for surgery.

## 2021-04-27 NOTE — RESULTS/DATA
[] : results reviewed [de-identified] : WNL [de-identified] : Na 128, asymptomatic will repeat today. Na improved-- 132 [de-identified] : no change from prior.

## 2021-04-27 NOTE — ASSESSMENT
[Patient Optimized for Surgery] : Patient optimized for surgery [No Further Testing Recommended] : no further testing recommended [Continue medications as is] : Continue current medications [As per surgery] : as per surgery [FreeTextEntry4] : 86 yo M is low-moderate cardiovascular risk for surgery. He will likely be cleared for surgery pending labs.\par \par

## 2021-04-27 NOTE — HISTORY OF PRESENT ILLNESS
[No Pertinent Cardiac History] : no history of aortic stenosis, atrial fibrillation, coronary artery disease, recent myocardial infarction, or implantable device/pacemaker [COPD] : COPD [No Adverse Anesthesia Reaction] : no adverse anesthesia reaction in self or family member [Chronic Kidney Disease] : chronic kidney disease [(Patient denies any chest pain, claudication, dyspnea on exertion, orthopnea, palpitations or syncope)] : Patient denies any chest pain, claudication, dyspnea on exertion, orthopnea, palpitations or syncope [Moderate (4-6 METs)] : Moderate (4-6 METs) [Family Member] : family member [Chronic Anticoagulation] : no chronic anticoagulation [Diabetes] : no diabetes [FreeTextEntry1] : left revision glossectomy and left neck dissection [FreeTextEntry2] : 5/6/2021 [FreeTextEntry3] : Dr. Mariano Dick [FreeTextEntry4] : 86 yo M with COPD, CKD, hypothyroidism, claustrophobia, hx renal cancer, hx oral cancer presents for pre-op clearance. Pt recently found to have recurrence of oral cancer, will be undergoing resection. \par \par COPD-- has been dx for many years, stable. Has not followed up with pulm. Denies much SOB, only in enclosed, crowded places.

## 2021-04-30 ENCOUNTER — APPOINTMENT (OUTPATIENT)
Dept: ULTRASOUND IMAGING | Facility: IMAGING CENTER | Age: 86
End: 2021-04-30
Payer: MEDICARE

## 2021-04-30 ENCOUNTER — OUTPATIENT (OUTPATIENT)
Dept: OUTPATIENT SERVICES | Facility: HOSPITAL | Age: 86
LOS: 1 days | End: 2021-04-30
Payer: MEDICARE

## 2021-04-30 ENCOUNTER — RESULT REVIEW (OUTPATIENT)
Age: 86
End: 2021-04-30

## 2021-04-30 DIAGNOSIS — K11.8 OTHER DISEASES OF SALIVARY GLANDS: ICD-10-CM

## 2021-04-30 PROCEDURE — 88305 TISSUE EXAM BY PATHOLOGIST: CPT | Mod: 26

## 2021-04-30 PROCEDURE — 10005 FNA BX W/US GDN 1ST LES: CPT

## 2021-04-30 PROCEDURE — 88173 CYTOPATH EVAL FNA REPORT: CPT | Mod: 26

## 2021-04-30 PROCEDURE — 88173 CYTOPATH EVAL FNA REPORT: CPT

## 2021-04-30 PROCEDURE — 88172 CYTP DX EVAL FNA 1ST EA SITE: CPT

## 2021-04-30 PROCEDURE — 88305 TISSUE EXAM BY PATHOLOGIST: CPT

## 2021-05-03 ENCOUNTER — APPOINTMENT (OUTPATIENT)
Dept: DISASTER EMERGENCY | Facility: CLINIC | Age: 86
End: 2021-05-03

## 2021-05-04 LAB — SARS-COV-2 N GENE NPH QL NAA+PROBE: NOT DETECTED

## 2021-05-05 ENCOUNTER — TRANSCRIPTION ENCOUNTER (OUTPATIENT)
Age: 86
End: 2021-05-05

## 2021-05-05 VITALS
HEIGHT: 67 IN | HEART RATE: 74 BPM | SYSTOLIC BLOOD PRESSURE: 161 MMHG | TEMPERATURE: 98 F | DIASTOLIC BLOOD PRESSURE: 80 MMHG | RESPIRATION RATE: 18 BRPM | WEIGHT: 117.51 LBS | OXYGEN SATURATION: 100 %

## 2021-05-05 RX ORDER — ALENDRONATE SODIUM 70 MG/1
1 TABLET ORAL
Qty: 0 | Refills: 0 | DISCHARGE

## 2021-05-05 NOTE — PATIENT PROFILE ADULT - NSPRONUTRITIONRISK_GEN_A_NUR
[FreeTextEntry1] : Labs, path, imaging reviewed and discussed\par \par Platelets normal -> 67 (9/2020) -> 49 (today)\par \par CT chest\par Improvement of the right upper lobe loculated hydropneumothorax with resolution of a\par component. Right apical residual parenchymal densities as described above may represent surgical\par residual and associated atelectasis/fibrosis. Underlying residual or recurrent neoplasm cannot be\par definitively excluded but overall no significant change is apparent. Other nodular densities are\par seen including pleural-based nodules as described in detail above which appear relatively stable.\par 
No indicators present

## 2021-05-06 ENCOUNTER — INPATIENT (INPATIENT)
Facility: HOSPITAL | Age: 86
LOS: 5 days | Discharge: HOME | DRG: 144 | End: 2021-05-12
Attending: OTOLARYNGOLOGY | Admitting: OTOLARYNGOLOGY
Payer: MEDICARE

## 2021-05-06 ENCOUNTER — RESULT REVIEW (OUTPATIENT)
Age: 86
End: 2021-05-06

## 2021-05-06 ENCOUNTER — APPOINTMENT (OUTPATIENT)
Dept: OTOLARYNGOLOGY | Facility: HOSPITAL | Age: 86
End: 2021-05-06

## 2021-05-06 DIAGNOSIS — Z98.890 OTHER SPECIFIED POSTPROCEDURAL STATES: Chronic | ICD-10-CM

## 2021-05-06 LAB
ANION GAP SERPL CALC-SCNC: 10 MMOL/L — SIGNIFICANT CHANGE UP (ref 5–17)
BUN SERPL-MCNC: 25 MG/DL — HIGH (ref 7–23)
CALCIUM SERPL-MCNC: 8.7 MG/DL — SIGNIFICANT CHANGE UP (ref 8.4–10.5)
CHLORIDE SERPL-SCNC: 99 MMOL/L — SIGNIFICANT CHANGE UP (ref 96–108)
CO2 SERPL-SCNC: 23 MMOL/L — SIGNIFICANT CHANGE UP (ref 22–31)
CREAT SERPL-MCNC: 1.56 MG/DL — HIGH (ref 0.5–1.3)
GLUCOSE SERPL-MCNC: 132 MG/DL — HIGH (ref 70–99)
POTASSIUM SERPL-MCNC: 5.1 MMOL/L — SIGNIFICANT CHANGE UP (ref 3.5–5.3)
POTASSIUM SERPL-SCNC: 5.1 MMOL/L — SIGNIFICANT CHANGE UP (ref 3.5–5.3)
SODIUM SERPL-SCNC: 132 MMOL/L — LOW (ref 135–145)

## 2021-05-06 PROCEDURE — 42415 EXCISE PAROTID GLAND/LESION: CPT

## 2021-05-06 PROCEDURE — 41120 PARTIAL REMOVAL OF TONGUE: CPT

## 2021-05-06 PROCEDURE — 71045 X-RAY EXAM CHEST 1 VIEW: CPT | Mod: 26

## 2021-05-06 PROCEDURE — 88307 TISSUE EXAM BY PATHOLOGIST: CPT | Mod: 26

## 2021-05-06 PROCEDURE — 88334 PATH CONSLTJ SURG CYTO XM EA: CPT | Mod: 26,59

## 2021-05-06 PROCEDURE — 38724 REMOVAL OF LYMPH NODES NECK: CPT

## 2021-05-06 PROCEDURE — 92516 FACIAL NERVE FUNCTION TEST: CPT

## 2021-05-06 PROCEDURE — 88331 PATH CONSLTJ SURG 1 BLK 1SPC: CPT | Mod: 26

## 2021-05-06 PROCEDURE — 88309 TISSUE EXAM BY PATHOLOGIST: CPT | Mod: 26

## 2021-05-06 PROCEDURE — 88305 TISSUE EXAM BY PATHOLOGIST: CPT | Mod: 26

## 2021-05-06 PROCEDURE — 71045 X-RAY EXAM CHEST 1 VIEW: CPT | Mod: 26,77

## 2021-05-06 RX ORDER — AMPICILLIN SODIUM AND SULBACTAM SODIUM 250; 125 MG/ML; MG/ML
1.5 INJECTION, POWDER, FOR SUSPENSION INTRAMUSCULAR; INTRAVENOUS EVERY 6 HOURS
Refills: 0 | Status: DISCONTINUED | OUTPATIENT
Start: 2021-05-06 | End: 2021-05-11

## 2021-05-06 RX ORDER — HYDRALAZINE HCL 50 MG
10 TABLET ORAL EVERY 4 HOURS
Refills: 0 | Status: DISCONTINUED | OUTPATIENT
Start: 2021-05-06 | End: 2021-05-12

## 2021-05-06 RX ORDER — ONDANSETRON 8 MG/1
4 TABLET, FILM COATED ORAL EVERY 6 HOURS
Refills: 0 | Status: DISCONTINUED | OUTPATIENT
Start: 2021-05-06 | End: 2021-05-11

## 2021-05-06 RX ORDER — HYDROMORPHONE HYDROCHLORIDE 2 MG/ML
0.5 INJECTION INTRAMUSCULAR; INTRAVENOUS; SUBCUTANEOUS ONCE
Refills: 0 | Status: DISCONTINUED | OUTPATIENT
Start: 2021-05-06 | End: 2021-05-06

## 2021-05-06 RX ORDER — OXYMETAZOLINE HYDROCHLORIDE 0.5 MG/ML
2 SPRAY NASAL ONCE
Refills: 0 | Status: DISCONTINUED | OUTPATIENT
Start: 2021-05-06 | End: 2021-05-07

## 2021-05-06 RX ORDER — LANOLIN ALCOHOL/MO/W.PET/CERES
1 CREAM (GRAM) TOPICAL AT BEDTIME
Refills: 0 | Status: DISCONTINUED | OUTPATIENT
Start: 2021-05-06 | End: 2021-05-12

## 2021-05-06 RX ORDER — LIDOCAINE 4 G/100G
1 CREAM TOPICAL ONCE
Refills: 0 | Status: DISCONTINUED | OUTPATIENT
Start: 2021-05-06 | End: 2021-05-06

## 2021-05-06 RX ORDER — LIDOCAINE 4 G/100G
1 CREAM TOPICAL ONCE
Refills: 0 | Status: DISCONTINUED | OUTPATIENT
Start: 2021-05-06 | End: 2021-05-07

## 2021-05-06 RX ORDER — OXYCODONE HYDROCHLORIDE 5 MG/1
10 TABLET ORAL EVERY 6 HOURS
Refills: 0 | Status: DISCONTINUED | OUTPATIENT
Start: 2021-05-06 | End: 2021-05-06

## 2021-05-06 RX ORDER — SODIUM CHLORIDE 9 MG/ML
1000 INJECTION INTRAMUSCULAR; INTRAVENOUS; SUBCUTANEOUS
Refills: 0 | Status: DISCONTINUED | OUTPATIENT
Start: 2021-05-06 | End: 2021-05-07

## 2021-05-06 RX ORDER — OXYCODONE HYDROCHLORIDE 5 MG/1
5 TABLET ORAL EVERY 4 HOURS
Refills: 0 | Status: DISCONTINUED | OUTPATIENT
Start: 2021-05-06 | End: 2021-05-06

## 2021-05-06 RX ORDER — HEPARIN SODIUM 5000 [USP'U]/ML
5000 INJECTION INTRAVENOUS; SUBCUTANEOUS EVERY 12 HOURS
Refills: 0 | Status: DISCONTINUED | OUTPATIENT
Start: 2021-05-06 | End: 2021-05-06

## 2021-05-06 RX ORDER — LEVOTHYROXINE SODIUM 125 MCG
1 TABLET ORAL
Qty: 0 | Refills: 0 | DISCHARGE

## 2021-05-06 RX ORDER — CALCIUM CARBONATE 500(1250)
500 TABLET ORAL DAILY
Refills: 0 | Status: DISCONTINUED | OUTPATIENT
Start: 2021-05-06 | End: 2021-05-12

## 2021-05-06 RX ORDER — TAMSULOSIN HYDROCHLORIDE 0.4 MG/1
1 CAPSULE ORAL
Qty: 0 | Refills: 0 | DISCHARGE

## 2021-05-06 RX ORDER — HEPARIN SODIUM 5000 [USP'U]/ML
5000 INJECTION INTRAVENOUS; SUBCUTANEOUS EVERY 12 HOURS
Refills: 0 | Status: DISCONTINUED | OUTPATIENT
Start: 2021-05-07 | End: 2021-05-07

## 2021-05-06 RX ORDER — ACETAMINOPHEN 500 MG
1000 TABLET ORAL ONCE
Refills: 0 | Status: DISCONTINUED | OUTPATIENT
Start: 2021-05-06 | End: 2021-05-06

## 2021-05-06 RX ORDER — ACETAMINOPHEN 500 MG
650 TABLET ORAL EVERY 6 HOURS
Refills: 0 | Status: DISCONTINUED | OUTPATIENT
Start: 2021-05-06 | End: 2021-05-12

## 2021-05-06 RX ORDER — LEVOTHYROXINE SODIUM 125 MCG
50 TABLET ORAL DAILY
Refills: 0 | Status: DISCONTINUED | OUTPATIENT
Start: 2021-05-06 | End: 2021-05-12

## 2021-05-06 RX ORDER — FEXOFENADINE HCL 30 MG
1 TABLET ORAL
Qty: 0 | Refills: 0 | DISCHARGE

## 2021-05-06 RX ADMIN — SODIUM CHLORIDE 80 MILLILITER(S): 9 INJECTION INTRAMUSCULAR; INTRAVENOUS; SUBCUTANEOUS at 17:26

## 2021-05-06 RX ADMIN — HYDROMORPHONE HYDROCHLORIDE 0.5 MILLIGRAM(S): 2 INJECTION INTRAMUSCULAR; INTRAVENOUS; SUBCUTANEOUS at 17:27

## 2021-05-06 RX ADMIN — HYDROMORPHONE HYDROCHLORIDE 0.5 MILLIGRAM(S): 2 INJECTION INTRAMUSCULAR; INTRAVENOUS; SUBCUTANEOUS at 17:43

## 2021-05-06 RX ADMIN — AMPICILLIN SODIUM AND SULBACTAM SODIUM 100 GRAM(S): 250; 125 INJECTION, POWDER, FOR SUSPENSION INTRAMUSCULAR; INTRAVENOUS at 23:51

## 2021-05-06 RX ADMIN — AMPICILLIN SODIUM AND SULBACTAM SODIUM 100 GRAM(S): 250; 125 INJECTION, POWDER, FOR SUSPENSION INTRAMUSCULAR; INTRAVENOUS at 17:34

## 2021-05-06 NOTE — H&P ADULT - NSICDXPASTSURGICALHX_GEN_ALL_CORE_FT
PAST SURGICAL HISTORY:  History of kidney surgery removal right 2013    History of surgery tongue 2004, 2017    History of Tonsillectomy (ICD9 V45.89) removed the cancer from tounge 2004    Retinal detachment right & left- laser surgery  bilateral    S/P cataract extraction and insertion of intraocular lens right    S/P tonsillectomy

## 2021-05-06 NOTE — H&P ADULT - ASSESSMENT
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   Neuro: pain control prn, tylenol and oxy via NGT   Resp: breathing comfortably on room air  CV: no history, not on any meds, hydralazine prn for SBP > 170   FEN/GI: NPO, f/u CXR to confirm NGT, start TF, all meds per tube  ID: Unasyn q6h   PPx: SQH, SCDs   Wound: baci to incisions BID, BABITA to low continuous wall suction, saline rinses QID

## 2021-05-06 NOTE — PACU DISCHARGE NOTE - COMMENTS
pt aao x3.  VSS.  left neck incision intact with sutures.  left neck BABITA x1 to ss.  right nostril NGT.  pt denies c/o pain.  report given to RNB on 8 lachman.  pt to go to 822 via stretcher on monitor with RN and PCA

## 2021-05-06 NOTE — H&P ADULT - NSICDXPASTMEDICALHX_GEN_ALL_CORE_FT
PAST MEDICAL HISTORY:  BPH (Benign Prostatic Hyperplasia)     Cancer of Tongue (ICD9 141.9)     Cataract     Claustrophobia     COPD (chronic obstructive pulmonary disease) claustrophobic in small places    Decreased renal function only 1 kidney, left    Hypothyroidism     Liver hemangioma     MVP (mitral valve prolapse)     Oral cancer     Prediabetes     Tricuspid Murmur     Vertigo (ICD9 780.4)

## 2021-05-06 NOTE — H&P ADULT - NSHPPHYSICALEXAM_GEN_ALL_CORE
NAD, A&Ox3  Breathing comfortably   NGT in place on R side   Oral cavity with blood tinged secretions, s/p partial glossectomy   Neck soft, flat, incision with baci, BABITA to low continuous wall suction with serosanguinous output

## 2021-05-06 NOTE — H&P ADULT - HISTORY OF PRESENT ILLNESS
85M with history of multiple tongue cancers, underwent left partial glossectomy in 2004, path showed SCC. In 2017 developed a new lesion on the left tongue and underwent an additional partial glossectomy, path showed dysplasia. Patient did not undergo neck dissection with either procedure and has never had chemotherapy or radiation. He presented with a 2 month history of a new lesion on the left tongue, biopsy in the office showed SCC. A PET scan was done which did not show any clinically positive leticia disease but had an incidental finding of a left parotid tumor. 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     The patient is now s/p left neck dissection, excision of left parotid tumor, and left partial glossectomy on 5/6/21. Doing well post op.

## 2021-05-06 NOTE — H&P ADULT - NSICDXFAMILYHX_GEN_ALL_CORE_FT
FAMILY HISTORY:  Sibling  Still living? Yes, Estimated age: Age Unknown  Family history of hypertension, Age at diagnosis: Age Unknown

## 2021-05-07 LAB
ANION GAP SERPL CALC-SCNC: 10 MMOL/L — SIGNIFICANT CHANGE UP (ref 5–17)
BUN SERPL-MCNC: 26 MG/DL — HIGH (ref 7–23)
CALCIUM SERPL-MCNC: 8.6 MG/DL — SIGNIFICANT CHANGE UP (ref 8.4–10.5)
CHLORIDE SERPL-SCNC: 100 MMOL/L — SIGNIFICANT CHANGE UP (ref 96–108)
CO2 SERPL-SCNC: 23 MMOL/L — SIGNIFICANT CHANGE UP (ref 22–31)
COVID-19 SPIKE DOMAIN AB INTERP: POSITIVE
COVID-19 SPIKE DOMAIN ANTIBODY RESULT: 102 U/ML — HIGH
CREAT SERPL-MCNC: 1.43 MG/DL — HIGH (ref 0.5–1.3)
GLUCOSE SERPL-MCNC: 113 MG/DL — HIGH (ref 70–99)
MAGNESIUM SERPL-MCNC: 2.1 MG/DL — SIGNIFICANT CHANGE UP (ref 1.6–2.6)
PHOSPHATE SERPL-MCNC: 3 MG/DL — SIGNIFICANT CHANGE UP (ref 2.5–4.5)
POTASSIUM SERPL-MCNC: 4.9 MMOL/L — SIGNIFICANT CHANGE UP (ref 3.5–5.3)
POTASSIUM SERPL-SCNC: 4.9 MMOL/L — SIGNIFICANT CHANGE UP (ref 3.5–5.3)
SARS-COV-2 IGG+IGM SERPL QL IA: 102 U/ML — HIGH
SARS-COV-2 IGG+IGM SERPL QL IA: POSITIVE
SODIUM SERPL-SCNC: 133 MMOL/L — LOW (ref 135–145)

## 2021-05-07 RX ORDER — DOXAZOSIN MESYLATE 4 MG
1 TABLET ORAL EVERY 24 HOURS
Refills: 0 | Status: DISCONTINUED | OUTPATIENT
Start: 2021-05-07 | End: 2021-05-07

## 2021-05-07 RX ORDER — DOXAZOSIN MESYLATE 4 MG
1 TABLET ORAL EVERY 24 HOURS
Refills: 0 | Status: DISCONTINUED | OUTPATIENT
Start: 2021-05-07 | End: 2021-05-12

## 2021-05-07 RX ORDER — SENNA PLUS 8.6 MG/1
2 TABLET ORAL AT BEDTIME
Refills: 0 | Status: DISCONTINUED | OUTPATIENT
Start: 2021-05-07 | End: 2021-05-12

## 2021-05-07 RX ORDER — POLYETHYLENE GLYCOL 3350 17 G/17G
17 POWDER, FOR SOLUTION ORAL EVERY 24 HOURS
Refills: 0 | Status: DISCONTINUED | OUTPATIENT
Start: 2021-05-07 | End: 2021-05-12

## 2021-05-07 RX ORDER — HEPARIN SODIUM 5000 [USP'U]/ML
5000 INJECTION INTRAVENOUS; SUBCUTANEOUS EVERY 8 HOURS
Refills: 0 | Status: DISCONTINUED | OUTPATIENT
Start: 2021-05-07 | End: 2021-05-12

## 2021-05-07 RX ORDER — TAMSULOSIN HYDROCHLORIDE 0.4 MG/1
0.4 CAPSULE ORAL AT BEDTIME
Refills: 0 | Status: DISCONTINUED | OUTPATIENT
Start: 2021-05-07 | End: 2021-05-07

## 2021-05-07 RX ADMIN — Medication 1 MILLIGRAM(S): at 10:47

## 2021-05-07 RX ADMIN — HEPARIN SODIUM 5000 UNIT(S): 5000 INJECTION INTRAVENOUS; SUBCUTANEOUS at 13:00

## 2021-05-07 RX ADMIN — Medication 500 MILLIGRAM(S): at 11:50

## 2021-05-07 RX ADMIN — HEPARIN SODIUM 5000 UNIT(S): 5000 INJECTION INTRAVENOUS; SUBCUTANEOUS at 21:11

## 2021-05-07 RX ADMIN — AMPICILLIN SODIUM AND SULBACTAM SODIUM 100 GRAM(S): 250; 125 INJECTION, POWDER, FOR SUSPENSION INTRAMUSCULAR; INTRAVENOUS at 23:09

## 2021-05-07 RX ADMIN — SENNA PLUS 2 TABLET(S): 8.6 TABLET ORAL at 21:12

## 2021-05-07 RX ADMIN — AMPICILLIN SODIUM AND SULBACTAM SODIUM 100 GRAM(S): 250; 125 INJECTION, POWDER, FOR SUSPENSION INTRAMUSCULAR; INTRAVENOUS at 11:01

## 2021-05-07 RX ADMIN — POLYETHYLENE GLYCOL 3350 17 GRAM(S): 17 POWDER, FOR SOLUTION ORAL at 10:47

## 2021-05-07 RX ADMIN — Medication 50 MICROGRAM(S): at 06:19

## 2021-05-07 RX ADMIN — AMPICILLIN SODIUM AND SULBACTAM SODIUM 100 GRAM(S): 250; 125 INJECTION, POWDER, FOR SUSPENSION INTRAMUSCULAR; INTRAVENOUS at 06:19

## 2021-05-07 RX ADMIN — AMPICILLIN SODIUM AND SULBACTAM SODIUM 100 GRAM(S): 250; 125 INJECTION, POWDER, FOR SUSPENSION INTRAMUSCULAR; INTRAVENOUS at 17:40

## 2021-05-07 NOTE — PHYSICAL THERAPY INITIAL EVALUATION ADULT - DISCHARGE DISPOSITION, PT EVAL
Of note, daughter (Judy) present for PT session and stated she lives close by to her father and would be staying with him for a few days after d/c to assist as needed./home/no skilled PT needs Discussed with JOY Valle. Of note, daughter (Judy) present for PT session and stated she lives close by to her father and would be staying with him for a few days after d/c to assist as needed./home/no skilled PT needs

## 2021-05-07 NOTE — DIETITIAN INITIAL EVALUATION ADULT. - ORAL INTAKE PTA/DIET HISTORY
Reported eating well PTA. On a modified protein diet 2/2 impaired kidney function 2/2 h/o R nephrectomy. Weight has been stable but appears to have muscle wasting/fat loss but however don't know baseline.

## 2021-05-07 NOTE — DIETITIAN NUTRITION RISK NOTIFICATION - ADDITIONAL COMMENTS/DIETITIAN RECOMMENDATIONS
Pt has been adhering to a lower protein diet (50g/d) 2/2 kidney function. Reports UBW is ~117 but has gone down to 113lbs. Overall has been stable w/ weight. Pt is noted to have severe muscle wasting in clavicular, pectoral and temporal regions. and fat loss noted by loose skin folds in the bicep/tricep area. Suspect moderate malnutrition in the context of inadequate protein intake and muscle wasting/fat loss noted on NFPE. D

## 2021-05-07 NOTE — DIETITIAN INITIAL EVALUATION ADULT. - OTHER CALCULATIONS
ABW used for calculations as pt is 79% IBW w/ stable weight. Nutrient needs based on [facility] standards of care for maintenance in older adults. Needs adjusted for impaired kidney function, post-op healing, catabolic illness, suspected PCM.

## 2021-05-07 NOTE — PROVIDER CONTACT NOTE (OTHER) - ACTION/TREATMENT ORDERED:
Continue to monitor. The allison request will be reviewed with the patient in the am. Straight cath order in

## 2021-05-07 NOTE — DIETITIAN INITIAL EVALUATION ADULT. - OTHER INFO
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 Of note, pt has a h/o of kidney cancer and is s/p R nephrectomy (2013). Pt reports having ~30% kidney function. Being seen 2/2 ENT consult for TF regimen and also a malnutrition screen trigger for BMI <19.     Pt seen in room, resting in bed, feeds infusing at ordered goal. Currently ordered for Jevity 1.2 @50ml/hr x24hrs via NGT (1200ml TV, 1440kcal, 66g rpo, 968ml FW). Of note, pt has been adhering to a lower protein diet (50g/d) 2/2 kidney function. Reports UBW is ~117 but has gone down to 113lbs. Overall has been stable w/ weight. Pt is noted to have severe muscle wasting in clavicular, pectoral and temporal regions. and fat loss noted by loose skin folds in the bicep/tricep area. Suspect moderate malnutrition in the context of inadequate protein intake and muscle wasting/fat loss noted on NFPE. Discussed w/ pt increasing protein for optimal recovery but will monitor renal indices to assess need to reduce protein intake. Pt receptive and expressed understanding. Notable labs: Na 133, BUN 26, Cr 1.43. Reported baseline Cr 1.8 (communicated to team). Skin: surgical incision; GI: no BM yet, NGT in place. RD to follow.

## 2021-05-07 NOTE — PHYSICAL THERAPY INITIAL EVALUATION ADULT - GAIT DEVIATIONS NOTED, PT EVAL
ambulate with steady gait pattern, no LOB and good navigation of hallway obstacles. Denied any symptoms od lightheadedness/dizziness or SOB./decreased step length/decreased stride length

## 2021-05-07 NOTE — DIETITIAN NUTRITION RISK NOTIFICATION - OTHER RISK FACTORS
----- Message from Meena Chery sent at 4/4/2018  1:45 PM CDT -----  Patient stated that [facility] on [address] [facility] sent several requests with no response/requesting refill of medication: cetirizine 10 mg chewable tablet/please call patient back to advise.  
Called patient, someone hung up on me.  
Underweight (BMI < 19)

## 2021-05-07 NOTE — PROVIDER CONTACT NOTE (OTHER) - SITUATION
Patient has 400cc. he said he does not wish to be straight cath'd. He wishes to have a allison instead and be prescribed flomax. He said wait till the morning and put in a allison. Patient has 400cc. felt uncomfortable

## 2021-05-07 NOTE — PROVIDER CONTACT NOTE (OTHER) - ASSESSMENT
Patient states he is comfortable and would like to wait till the morning. Patient consented to straight cath

## 2021-05-07 NOTE — PHYSICAL THERAPY INITIAL EVALUATION ADULT - ADDITIONAL COMMENTS
Patient lives with his wife on the 3rd floor of an elevator access apartment building, no VALARIE. Patient denies use of assistive device during ambulation and reports being independent with all ADLs prior. Of note, patient's wife has Alzheimer's and is the main care taker for her.

## 2021-05-07 NOTE — PHYSICAL THERAPY INITIAL EVALUATION ADULT - PERTINENT HX OF CURRENT PROBLEM, REHAB EVAL
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

## 2021-05-07 NOTE — PROVIDER CONTACT NOTE (OTHER) - ACTION/TREATMENT ORDERED:
Suction very lightly only to the right side of the mouth. Notify provider if there is bright red blood coming out. LIP instructions were followed. MARGA Petersen at bedside assisted.

## 2021-05-07 NOTE — PHYSICAL THERAPY INITIAL EVALUATION ADULT - GENERAL OBSERVATIONS, REHAB EVAL
PT IE completed. FIM amb: 6. Patient received semi supine in bed +tele, +NG tube (off suction), +L neck incision C/D/I, +L neck BABITA drain, +heplpock IV, NAD, willing to work with PT.

## 2021-05-07 NOTE — DIETITIAN INITIAL EVALUATION ADULT. - ADD RECOMMEND
1. Continue with current EN regimen as it is appropriately meeting needs. If to require lower protein provisions, recommend switching over to low protein formula - Vital 1.0 @60ml/hr x24hrs (provides 1440ml TV, 1440kcal, 57g pro, 1.07g pro/kg ABW, 0.84g pro/kg IBW, 1200ml FW. 2. Monitor renal indices closely and make adjustments PRN. 3. Please obtain updated weight for accuracy and trending. 4. PO diet adv. per SLP.  5. BMP, BG Q6hrs, CBC per MD discretion

## 2021-05-07 NOTE — PROVIDER CONTACT NOTE (OTHER) - SITUATION
Patient was some dark red fluid with a clot coming from his mouth. He was also able to cough with relief.

## 2021-05-07 NOTE — DIETITIAN INITIAL EVALUATION ADULT. - ENTERAL
Continue w/ current EN regimen as it is appropriate for meeting needs. Additional FW per team 2/2 kidney function.

## 2021-05-07 NOTE — DIETITIAN NUTRITION RISK NOTIFICATION - TREATMENT: THE FOLLOWING DIET HAS BEEN RECOMMENDED
1. Continue with current EN regimen as it is appropriately meeting needs.   2. If to require lower protein provisions, recommend switching over to low protein formula - Vital 1.0 @60ml/hr x24hrs (provides 1440ml TV, 1440kcal, 57g pro, 1.07g pro/kg ABW, 0.84g pro/kg IBW, 1200ml FW.   3. Monitor renal indices closely and make adjustments PRN.   4. Please obtain updated weight for accuracy and trending.   5. PO diet adv. per SLP.    6. BMP, BG Q6hrs, CBC per MD discretion  *d/w ENT

## 2021-05-08 LAB
ANION GAP SERPL CALC-SCNC: 11 MMOL/L — SIGNIFICANT CHANGE UP (ref 5–17)
BUN SERPL-MCNC: 25 MG/DL — HIGH (ref 7–23)
CALCIUM SERPL-MCNC: 8.7 MG/DL — SIGNIFICANT CHANGE UP (ref 8.4–10.5)
CHLORIDE SERPL-SCNC: 98 MMOL/L — SIGNIFICANT CHANGE UP (ref 96–108)
CO2 SERPL-SCNC: 25 MMOL/L — SIGNIFICANT CHANGE UP (ref 22–31)
CREAT SERPL-MCNC: 1.44 MG/DL — HIGH (ref 0.5–1.3)
GLUCOSE SERPL-MCNC: 121 MG/DL — HIGH (ref 70–99)
POTASSIUM SERPL-MCNC: 4.2 MMOL/L — SIGNIFICANT CHANGE UP (ref 3.5–5.3)
POTASSIUM SERPL-SCNC: 4.2 MMOL/L — SIGNIFICANT CHANGE UP (ref 3.5–5.3)
SODIUM SERPL-SCNC: 134 MMOL/L — LOW (ref 135–145)

## 2021-05-08 RX ADMIN — HEPARIN SODIUM 5000 UNIT(S): 5000 INJECTION INTRAVENOUS; SUBCUTANEOUS at 22:10

## 2021-05-08 RX ADMIN — AMPICILLIN SODIUM AND SULBACTAM SODIUM 100 GRAM(S): 250; 125 INJECTION, POWDER, FOR SUSPENSION INTRAMUSCULAR; INTRAVENOUS at 12:00

## 2021-05-08 RX ADMIN — HEPARIN SODIUM 5000 UNIT(S): 5000 INJECTION INTRAVENOUS; SUBCUTANEOUS at 14:34

## 2021-05-08 RX ADMIN — Medication 500 MILLIGRAM(S): at 15:49

## 2021-05-08 RX ADMIN — Medication 50 MICROGRAM(S): at 05:21

## 2021-05-08 RX ADMIN — AMPICILLIN SODIUM AND SULBACTAM SODIUM 100 GRAM(S): 250; 125 INJECTION, POWDER, FOR SUSPENSION INTRAMUSCULAR; INTRAVENOUS at 23:10

## 2021-05-08 RX ADMIN — AMPICILLIN SODIUM AND SULBACTAM SODIUM 100 GRAM(S): 250; 125 INJECTION, POWDER, FOR SUSPENSION INTRAMUSCULAR; INTRAVENOUS at 18:26

## 2021-05-08 RX ADMIN — HEPARIN SODIUM 5000 UNIT(S): 5000 INJECTION INTRAVENOUS; SUBCUTANEOUS at 05:21

## 2021-05-08 RX ADMIN — AMPICILLIN SODIUM AND SULBACTAM SODIUM 100 GRAM(S): 250; 125 INJECTION, POWDER, FOR SUSPENSION INTRAMUSCULAR; INTRAVENOUS at 05:21

## 2021-05-08 RX ADMIN — Medication 1 MILLIGRAM(S): at 09:20

## 2021-05-08 NOTE — SWALLOW BEDSIDE ASSESSMENT ADULT - NS SPL SWALLOW CLINIC TRIAL FT
Adequate labial seal. Suspect reduced oral and pharyngeal swallow efficiency with multiple (~15-20) secondary swallows per small liquid bolus (<1tsp) + persistent c/o stasis further suggestive of pharyngeal clearance deficits. Pt with immediate wet coughing via yankauer to retrieve secretions from the oropharynx.

## 2021-05-08 NOTE — SWALLOW BEDSIDE ASSESSMENT ADULT - SLP PERTINENT HISTORY OF CURRENT PROBLEM
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

## 2021-05-08 NOTE — SWALLOW BEDSIDE ASSESSMENT ADULT - SWALLOW EVAL: DIAGNOSIS
Clinical signs of post-operative oropharyngeal dysphagia s/p left neck dissection, excision of left parotid tumor and left partial glossectomy. Pt with poor secretion management at baseline, suspected reduced oral and pharyngeal swallow efficiency, and overt s/s of aspiration with limited trials. PO appears premature at this time.

## 2021-05-08 NOTE — PROVIDER CONTACT NOTE (OTHER) - BACKGROUND
Pt has PMH of SCC of tongue, partial glossectomy performed on 5/6. Pt is A&Ox4, some difficulty speaking related to oral surgery, NG tube running tube feeds at 50 ml/hr.

## 2021-05-08 NOTE — SWALLOW BEDSIDE ASSESSMENT ADULT - ADDITIONAL RECOMMENDATIONS
F/u ~24-48 hours for reassessment and consideration for instrumental testing if appropriate at that time

## 2021-05-09 LAB
ANION GAP SERPL CALC-SCNC: 11 MMOL/L — SIGNIFICANT CHANGE UP (ref 5–17)
BUN SERPL-MCNC: 24 MG/DL — HIGH (ref 7–23)
CALCIUM SERPL-MCNC: 9.1 MG/DL — SIGNIFICANT CHANGE UP (ref 8.4–10.5)
CHLORIDE SERPL-SCNC: 99 MMOL/L — SIGNIFICANT CHANGE UP (ref 96–108)
CO2 SERPL-SCNC: 24 MMOL/L — SIGNIFICANT CHANGE UP (ref 22–31)
CREAT SERPL-MCNC: 1.38 MG/DL — HIGH (ref 0.5–1.3)
GLUCOSE SERPL-MCNC: 85 MG/DL — SIGNIFICANT CHANGE UP (ref 70–99)
POTASSIUM SERPL-MCNC: 4.2 MMOL/L — SIGNIFICANT CHANGE UP (ref 3.5–5.3)
POTASSIUM SERPL-SCNC: 4.2 MMOL/L — SIGNIFICANT CHANGE UP (ref 3.5–5.3)
SODIUM SERPL-SCNC: 134 MMOL/L — LOW (ref 135–145)

## 2021-05-09 PROCEDURE — 71045 X-RAY EXAM CHEST 1 VIEW: CPT | Mod: 26

## 2021-05-09 RX ORDER — SODIUM CHLORIDE 9 MG/ML
1000 INJECTION, SOLUTION INTRAVENOUS
Refills: 0 | Status: DISCONTINUED | OUTPATIENT
Start: 2021-05-09 | End: 2021-05-09

## 2021-05-09 RX ADMIN — AMPICILLIN SODIUM AND SULBACTAM SODIUM 100 GRAM(S): 250; 125 INJECTION, POWDER, FOR SUSPENSION INTRAMUSCULAR; INTRAVENOUS at 23:14

## 2021-05-09 RX ADMIN — Medication 650 MILLIGRAM(S): at 17:11

## 2021-05-09 RX ADMIN — AMPICILLIN SODIUM AND SULBACTAM SODIUM 100 GRAM(S): 250; 125 INJECTION, POWDER, FOR SUSPENSION INTRAMUSCULAR; INTRAVENOUS at 17:11

## 2021-05-09 RX ADMIN — Medication 1 MILLIGRAM(S): at 10:45

## 2021-05-09 RX ADMIN — SODIUM CHLORIDE 75 MILLILITER(S): 9 INJECTION, SOLUTION INTRAVENOUS at 03:27

## 2021-05-09 RX ADMIN — Medication 50 MICROGRAM(S): at 10:45

## 2021-05-09 RX ADMIN — Medication 500 MILLIGRAM(S): at 11:19

## 2021-05-09 RX ADMIN — AMPICILLIN SODIUM AND SULBACTAM SODIUM 100 GRAM(S): 250; 125 INJECTION, POWDER, FOR SUSPENSION INTRAMUSCULAR; INTRAVENOUS at 06:37

## 2021-05-09 RX ADMIN — HEPARIN SODIUM 5000 UNIT(S): 5000 INJECTION INTRAVENOUS; SUBCUTANEOUS at 21:56

## 2021-05-09 RX ADMIN — Medication 650 MILLIGRAM(S): at 18:07

## 2021-05-09 RX ADMIN — HEPARIN SODIUM 5000 UNIT(S): 5000 INJECTION INTRAVENOUS; SUBCUTANEOUS at 14:42

## 2021-05-09 RX ADMIN — AMPICILLIN SODIUM AND SULBACTAM SODIUM 100 GRAM(S): 250; 125 INJECTION, POWDER, FOR SUSPENSION INTRAMUSCULAR; INTRAVENOUS at 11:19

## 2021-05-09 RX ADMIN — HEPARIN SODIUM 5000 UNIT(S): 5000 INJECTION INTRAVENOUS; SUBCUTANEOUS at 06:37

## 2021-05-10 LAB
ANION GAP SERPL CALC-SCNC: 11 MMOL/L — SIGNIFICANT CHANGE UP (ref 5–17)
BUN SERPL-MCNC: 26 MG/DL — HIGH (ref 7–23)
CALCIUM SERPL-MCNC: 8.9 MG/DL — SIGNIFICANT CHANGE UP (ref 8.4–10.5)
CHLORIDE SERPL-SCNC: 100 MMOL/L — SIGNIFICANT CHANGE UP (ref 96–108)
CO2 SERPL-SCNC: 24 MMOL/L — SIGNIFICANT CHANGE UP (ref 22–31)
CREAT SERPL-MCNC: 1.4 MG/DL — HIGH (ref 0.5–1.3)
GLUCOSE SERPL-MCNC: 82 MG/DL — SIGNIFICANT CHANGE UP (ref 70–99)
POTASSIUM SERPL-MCNC: 4.2 MMOL/L — SIGNIFICANT CHANGE UP (ref 3.5–5.3)
POTASSIUM SERPL-SCNC: 4.2 MMOL/L — SIGNIFICANT CHANGE UP (ref 3.5–5.3)
SODIUM SERPL-SCNC: 135 MMOL/L — SIGNIFICANT CHANGE UP (ref 135–145)

## 2021-05-10 RX ADMIN — HEPARIN SODIUM 5000 UNIT(S): 5000 INJECTION INTRAVENOUS; SUBCUTANEOUS at 14:50

## 2021-05-10 RX ADMIN — HEPARIN SODIUM 5000 UNIT(S): 5000 INJECTION INTRAVENOUS; SUBCUTANEOUS at 05:05

## 2021-05-10 RX ADMIN — Medication 500 MILLIGRAM(S): at 12:10

## 2021-05-10 RX ADMIN — AMPICILLIN SODIUM AND SULBACTAM SODIUM 100 GRAM(S): 250; 125 INJECTION, POWDER, FOR SUSPENSION INTRAMUSCULAR; INTRAVENOUS at 05:05

## 2021-05-10 RX ADMIN — AMPICILLIN SODIUM AND SULBACTAM SODIUM 100 GRAM(S): 250; 125 INJECTION, POWDER, FOR SUSPENSION INTRAMUSCULAR; INTRAVENOUS at 18:29

## 2021-05-10 RX ADMIN — Medication 1 MILLIGRAM(S): at 10:32

## 2021-05-10 RX ADMIN — Medication 50 MICROGRAM(S): at 05:05

## 2021-05-10 RX ADMIN — SENNA PLUS 2 TABLET(S): 8.6 TABLET ORAL at 21:34

## 2021-05-10 RX ADMIN — HEPARIN SODIUM 5000 UNIT(S): 5000 INJECTION INTRAVENOUS; SUBCUTANEOUS at 21:34

## 2021-05-10 RX ADMIN — AMPICILLIN SODIUM AND SULBACTAM SODIUM 100 GRAM(S): 250; 125 INJECTION, POWDER, FOR SUSPENSION INTRAMUSCULAR; INTRAVENOUS at 12:11

## 2021-05-10 RX ADMIN — AMPICILLIN SODIUM AND SULBACTAM SODIUM 100 GRAM(S): 250; 125 INJECTION, POWDER, FOR SUSPENSION INTRAMUSCULAR; INTRAVENOUS at 23:32

## 2021-05-11 LAB
ANION GAP SERPL CALC-SCNC: 11 MMOL/L — SIGNIFICANT CHANGE UP (ref 5–17)
BUN SERPL-MCNC: 31 MG/DL — HIGH (ref 7–23)
CALCIUM SERPL-MCNC: 8.8 MG/DL — SIGNIFICANT CHANGE UP (ref 8.4–10.5)
CHLORIDE SERPL-SCNC: 102 MMOL/L — SIGNIFICANT CHANGE UP (ref 96–108)
CO2 SERPL-SCNC: 23 MMOL/L — SIGNIFICANT CHANGE UP (ref 22–31)
CREAT SERPL-MCNC: 1.53 MG/DL — HIGH (ref 0.5–1.3)
GLUCOSE SERPL-MCNC: 98 MG/DL — SIGNIFICANT CHANGE UP (ref 70–99)
POTASSIUM SERPL-MCNC: 4.5 MMOL/L — SIGNIFICANT CHANGE UP (ref 3.5–5.3)
POTASSIUM SERPL-SCNC: 4.5 MMOL/L — SIGNIFICANT CHANGE UP (ref 3.5–5.3)
SODIUM SERPL-SCNC: 136 MMOL/L — SIGNIFICANT CHANGE UP (ref 135–145)

## 2021-05-11 PROCEDURE — 74230 X-RAY XM SWLNG FUNCJ C+: CPT | Mod: 26

## 2021-05-11 RX ORDER — ONDANSETRON 8 MG/1
4 TABLET, FILM COATED ORAL EVERY 6 HOURS
Refills: 0 | Status: DISCONTINUED | OUTPATIENT
Start: 2021-05-11 | End: 2021-05-12

## 2021-05-11 RX ADMIN — AMPICILLIN SODIUM AND SULBACTAM SODIUM 100 GRAM(S): 250; 125 INJECTION, POWDER, FOR SUSPENSION INTRAMUSCULAR; INTRAVENOUS at 06:21

## 2021-05-11 RX ADMIN — Medication 500 MILLIGRAM(S): at 11:07

## 2021-05-11 RX ADMIN — HEPARIN SODIUM 5000 UNIT(S): 5000 INJECTION INTRAVENOUS; SUBCUTANEOUS at 06:21

## 2021-05-11 RX ADMIN — HEPARIN SODIUM 5000 UNIT(S): 5000 INJECTION INTRAVENOUS; SUBCUTANEOUS at 14:29

## 2021-05-11 RX ADMIN — HEPARIN SODIUM 5000 UNIT(S): 5000 INJECTION INTRAVENOUS; SUBCUTANEOUS at 21:34

## 2021-05-11 RX ADMIN — Medication 1 MILLIGRAM(S): at 11:07

## 2021-05-11 RX ADMIN — Medication 50 MICROGRAM(S): at 06:21

## 2021-05-11 NOTE — SWALLOW VFSS/MBS ASSESSMENT ADULT - ORAL PHASE COMMENTS
Piecemeal deglutition with significant oral residue after the initial swallow. Multiple secondary swallows necessary to transport bolus from oral cavity to the pharynx. A liquid wash was necessary, in between puree trials, to clear ~1/4 remaining puree bolus from oral cavity.

## 2021-05-11 NOTE — SWALLOW VFSS/MBS ASSESSMENT ADULT - DIAGNOSTIC IMPRESSIONS
Pt presents with mild oropharyngeal dysphagia in setting of post-surgical changes. Deficits marked by effortful and inefficient bolus clearance from the oral cavity. Multiple swallows and the use of a liquid wash were necessary to propel bolus posteriorly. Aspiration of thin liquids x1 occurred as a result of reduced swallow coordination. Pharyngeal swallow efficiency is preserved.

## 2021-05-11 NOTE — SWALLOW VFSS/MBS ASSESSMENT ADULT - PHARYNGEAL PHASE COMMENTS
Liquid bolus spills to either the vallecula/pyriform sinuses prior to the swallow. Tongue base retraction is WFL. Epiglottic movement results in complete inversion. Hyolaryngeal excursion was mildly reduced. Pharyngeal stripping wave was present and complete. SILENT aspiration occurred x1 during the 3rd attempted swallow to clear oral cavity. Liquid spilled over the pyriform sinuses and into the airway d/t poor coordination. PES opening yielded complete distension and duration with no obstruction of flow.

## 2021-05-12 ENCOUNTER — TRANSCRIPTION ENCOUNTER (OUTPATIENT)
Age: 86
End: 2021-05-12

## 2021-05-12 VITALS — TEMPERATURE: 98 F

## 2021-05-12 LAB
ANION GAP SERPL CALC-SCNC: 9 MMOL/L — SIGNIFICANT CHANGE UP (ref 5–17)
BUN SERPL-MCNC: 30 MG/DL — HIGH (ref 7–23)
CALCIUM SERPL-MCNC: 8.5 MG/DL — SIGNIFICANT CHANGE UP (ref 8.4–10.5)
CHLORIDE SERPL-SCNC: 102 MMOL/L — SIGNIFICANT CHANGE UP (ref 96–108)
CO2 SERPL-SCNC: 25 MMOL/L — SIGNIFICANT CHANGE UP (ref 22–31)
CREAT SERPL-MCNC: 1.54 MG/DL — HIGH (ref 0.5–1.3)
GLUCOSE SERPL-MCNC: 96 MG/DL — SIGNIFICANT CHANGE UP (ref 70–99)
POTASSIUM SERPL-MCNC: 4.4 MMOL/L — SIGNIFICANT CHANGE UP (ref 3.5–5.3)
POTASSIUM SERPL-SCNC: 4.4 MMOL/L — SIGNIFICANT CHANGE UP (ref 3.5–5.3)
SODIUM SERPL-SCNC: 136 MMOL/L — SIGNIFICANT CHANGE UP (ref 135–145)
SURGICAL PATHOLOGY STUDY: SIGNIFICANT CHANGE UP

## 2021-05-12 PROCEDURE — 92611 MOTION FLUOROSCOPY/SWALLOW: CPT

## 2021-05-12 PROCEDURE — 97161 PT EVAL LOW COMPLEX 20 MIN: CPT

## 2021-05-12 PROCEDURE — 86900 BLOOD TYPING SEROLOGIC ABO: CPT

## 2021-05-12 PROCEDURE — 88333 PATH CONSLTJ SURG CYTO XM 1: CPT

## 2021-05-12 PROCEDURE — 88331 PATH CONSLTJ SURG 1 BLK 1SPC: CPT

## 2021-05-12 PROCEDURE — 86850 RBC ANTIBODY SCREEN: CPT

## 2021-05-12 PROCEDURE — 88305 TISSUE EXAM BY PATHOLOGIST: CPT

## 2021-05-12 PROCEDURE — 92526 ORAL FUNCTION THERAPY: CPT

## 2021-05-12 PROCEDURE — 74230 X-RAY XM SWLNG FUNCJ C+: CPT

## 2021-05-12 PROCEDURE — C1889: CPT

## 2021-05-12 PROCEDURE — 88309 TISSUE EXAM BY PATHOLOGIST: CPT

## 2021-05-12 PROCEDURE — 92610 EVALUATE SWALLOWING FUNCTION: CPT

## 2021-05-12 PROCEDURE — 83735 ASSAY OF MAGNESIUM: CPT

## 2021-05-12 PROCEDURE — 84100 ASSAY OF PHOSPHORUS: CPT

## 2021-05-12 PROCEDURE — 36415 COLL VENOUS BLD VENIPUNCTURE: CPT

## 2021-05-12 PROCEDURE — 86769 SARS-COV-2 COVID-19 ANTIBODY: CPT

## 2021-05-12 PROCEDURE — 80048 BASIC METABOLIC PNL TOTAL CA: CPT

## 2021-05-12 PROCEDURE — 86901 BLOOD TYPING SEROLOGIC RH(D): CPT

## 2021-05-12 PROCEDURE — 71045 X-RAY EXAM CHEST 1 VIEW: CPT

## 2021-05-12 PROCEDURE — 97116 GAIT TRAINING THERAPY: CPT

## 2021-05-12 PROCEDURE — 88307 TISSUE EXAM BY PATHOLOGIST: CPT

## 2021-05-12 RX ADMIN — HEPARIN SODIUM 5000 UNIT(S): 5000 INJECTION INTRAVENOUS; SUBCUTANEOUS at 05:35

## 2021-05-12 RX ADMIN — Medication 1 MILLIGRAM(S): at 09:06

## 2021-05-12 RX ADMIN — POLYETHYLENE GLYCOL 3350 17 GRAM(S): 17 POWDER, FOR SOLUTION ORAL at 09:06

## 2021-05-12 RX ADMIN — Medication 50 MICROGRAM(S): at 05:35

## 2021-05-12 NOTE — PROGRESS NOTE ADULT - SUBJECTIVE AND OBJECTIVE BOX
ENT PROGRESS NOTE    85M with history of multiple tongue cancers, underwent left partial glossectomy in 2004, path showed SCC. In 2017 developed a new lesion on the left tongue and underwent an additional partial glossectomy, path showed dysplasia. Patient did not undergo neck dissection with either procedure and has never had chemotherapy or radiation. He presented with a 2 month history of a new lesion on the left tongue, biopsy in the office showed SCC. A PET scan was done which did not show any clinically positive leticia disease but had an incidental finding of a left parotid tumor. 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     The patient is now s/p left neck dissection, excision of left parotid tumor, and left partial glossectomy on 5/6/21. Doing well post op.     INTERVAL:    5/8: Pt required NG tube advancement yesterday with 2nd CXR showing good placement. Had some throat pain which has improved overnight. This morning, pt had some blood-tinged sputum which he has been spitting up. Straight cath 9pm and 5am overnight.  5/9: NAEON. Still having some secretions from mouth, but less than before. No other concerns. Passed TOV yesterday  5/10: NAEON. SLP did not clear for diet yesterday and would like MBS on Mon. Likely 2/2 postop swelling. Continuing to urinate. NG was replaced at bedside because it clogged overnight.      PAST MEDICAL & SURGICAL HISTORY:  Vertigo (ICD9 780.4)    Cancer of Tongue (ICD9 141.9)    Cataract    Hypothyroidism    COPD (chronic obstructive pulmonary disease)  claustrophobic in small places    MVP (mitral valve prolapse)    Tricuspid Murmur    Liver hemangioma    BPH (Benign Prostatic Hyperplasia)    Decreased renal function  only 1 kidney, left    Claustrophobia    Oral cancer    Prediabetes    History of Tonsillectomy (ICD9 V45.89)  removed the cancer from tounge 2004    S/P cataract extraction and insertion of intraocular lens  right    Retinal detachment  right &amp; left- laser surgery  bilateral    S/P tonsillectomy    History of kidney surgery  removal right 2013    History of surgery  tongue 2004, 2017      Chlorhexidine Gluconate (Pruritus; Rash)  NSAIDs (Other)  sulfa drugs (Urticaria; Rash)    MEDICATIONS  (STANDING):  ampicillin/sulbactam  IVPB 1.5 Gram(s) IV Intermittent every 6 hours  calcium carbonate   Suspension 500 milliGRAM(s) Enteral Tube daily  doxazosin 1 milliGRAM(s) Oral every 24 hours  heparin   Injectable 5000 Unit(s) SubCutaneous every 8 hours  lactated ringers. 1000 milliLiter(s) (75 mL/Hr) IV Continuous <Continuous>  levothyroxine 50 MICROGram(s) Enteral Tube daily  polyethylene glycol 3350 17 Gram(s) Oral every 24 hours  senna 2 Tablet(s) Oral at bedtime    MEDICATIONS  (PRN):  acetaminophen    Suspension .. 650 milliGRAM(s) Enteral Tube every 6 hours PRN Temp greater or equal to 38C (100.4F), Mild Pain (1 - 3)  artificial tears (preservative free) Ophthalmic Solution 2 Drop(s) Both EYES three times a day PRN Dry Eyes  hydrALAZINE 10 milliGRAM(s) Oral every 4 hours PRN Systolic blood pressure > 170  melatonin Liquid 1 milliGRAM(s) Oral at bedtime PRN Sleep  ondansetron Injectable 4 milliGRAM(s) IV Push every 6 hours PRN Nausea and/or Vomiting      Objective    ICU Vital Signs Last 24 Hrs  T(C): 36.8 (09 May 2021 09:36), Max: 36.9 (08 May 2021 18:58)  T(F): 98.3 (09 May 2021 09:36), Max: 98.4 (08 May 2021 18:58)  HR: 68 (09 May 2021 08:50) (68 - 86)  BP: 159/93 (09 May 2021 08:50) (148/80 - 161/88)  BP(mean): 121 (09 May 2021 08:50) (106 - 121)  ABP: --  ABP(mean): --  RR: 16 (09 May 2021 08:50) (16 - 18)  SpO2: 97% (09 May 2021 08:50) (96% - 98%)        NAD, A&Ox3  Breathing comfortably   NGT in place on R side   Oral cavity with blood tinged secretions, s/p partial glossectomy   Neck soft, flat, incision with baci, BABITA to self suction          05-09    134<L>  |  99  |  24<H>  ----------------------------<  85  4.2   |  24  |  1.38<H>    Ca    9.1      09 May 2021 06:52        I&O's Summary    08 May 2021 07:01  -  09 May 2021 07:00  --------------------------------------------------------  IN: 1665 mL / OUT: 1420 mL / NET: 245 mL      
ENT PROGRESS NOTE    85M with history of multiple tongue cancers, underwent left partial glossectomy in 2004, path showed SCC. In 2017 developed a new lesion on the left tongue and underwent an additional partial glossectomy, path showed dysplasia. Patient did not undergo neck dissection with either procedure and has never had chemotherapy or radiation. He presented with a 2 month history of a new lesion on the left tongue, biopsy in the office showed SCC. A PET scan was done which did not show any clinically positive leticia disease but had an incidental finding of a left parotid tumor. 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     The patient is now s/p left neck dissection, excision of left parotid tumor, and left partial glossectomy on 5/6/21. Doing well post op.     INTERVAL:  5/7: Pt required NG tube advancement yesterday with 2nd CXR showing good placement. Had some throat pain which has improved overnight. This morning, pt had some blood-tinged sputum which he has been spitting up. Straight cath 9pm and 5am overnight.  5/8: NAEON. Still having some secretions from mouth, but less than before. No other concerns. Passed TOV yesterday  5/9: NAEON. SLP did not clear for diet yesterday and would like MBS on Mon. Likely 2/2 postop swelling. Continuing to urinate. NG was replaced at bedside because it clogged overnight.  5/10: NAEON.  Pain in neck improving.  Noted some intermittent sharp globus sensation with swallowing only.  No other acute complaints.  5/11: BABITA drain removed yesterday.  No issues overnight.  Noted IV infiltration this morning.  Pain well controlled, no other acute complaints.  5/12: NAEON. L post-auricular sialocele aspirated (3cc of fluid). Pain improved. No other complaints.    PAST MEDICAL & SURGICAL HISTORY:  Vertigo (ICD9 780.4)    Cancer of Tongue (ICD9 141.9)    Cataract    Hypothyroidism    COPD (chronic obstructive pulmonary disease)  claustrophobic in small places    MVP (mitral valve prolapse)    Tricuspid Murmur    Liver hemangioma    BPH (Benign Prostatic Hyperplasia)    Decreased renal function  only 1 kidney, left    Claustrophobia    Oral cancer    Prediabetes    History of Tonsillectomy (ICD9 V45.89)  removed the cancer from tounge 2004    S/P cataract extraction and insertion of intraocular lens  right    Retinal detachment  right &amp; left- laser surgery  bilateral    S/P tonsillectomy    History of kidney surgery  removal right 2013    History of surgery  tongue 2004, 2017      Chlorhexidine Gluconate (Pruritus; Rash)  NSAIDs (Other)  sulfa drugs (Urticaria; Rash)    MEDICATIONS  (STANDING):  calcium carbonate   Suspension 500 milliGRAM(s) Enteral Tube daily  doxazosin 1 milliGRAM(s) Oral every 24 hours  heparin   Injectable 5000 Unit(s) SubCutaneous every 8 hours  levothyroxine 50 MICROGram(s) Enteral Tube daily  polyethylene glycol 3350 17 Gram(s) Oral every 24 hours  senna 2 Tablet(s) Oral at bedtime    MEDICATIONS  (PRN):  acetaminophen    Suspension .. 650 milliGRAM(s) Enteral Tube every 6 hours PRN Temp greater or equal to 38C (100.4F), Mild Pain (1 - 3)  artificial tears (preservative free) Ophthalmic Solution 2 Drop(s) Both EYES three times a day PRN Dry Eyes  hydrALAZINE 10 milliGRAM(s) Oral every 4 hours PRN Systolic blood pressure > 170  melatonin Liquid 1 milliGRAM(s) Oral at bedtime PRN Sleep  ondansetron    Tablet 4 milliGRAM(s) Oral every 6 hours PRN Nausea and/or Vomiting      Objective    ICU Vital Signs Last 24 Hrs  T(C): 36.8 (12 May 2021 04:55), Max: 37.2 (11 May 2021 13:10)  T(F): 98.2 (12 May 2021 04:55), Max: 98.9 (11 May 2021 13:10)  HR: 68 (12 May 2021 03:45) (68 - 100)  BP: 136/70 (12 May 2021 03:45) (124/65 - 158/86)  BP(mean): 97 (12 May 2021 03:45) (89 - 117)  ABP: --  ABP(mean): --  RR: 16 (12 May 2021 03:45) (16 - 20)  SpO2: 96% (12 May 2021 03:45) (96% - 98%)    Physical Exam:  NAD, A&Ox3  Breathing comfortably   Oral cavity s/p partial glossectomy, no active bleeding, incisions clean and intact  Neck soft, flat, incision c/d/i, BABITA site well healing            05-11    136  |  102  |  31<H>  ----------------------------<  98  4.5   |  23  |  1.53<H>    Ca    8.8      11 May 2021 07:55        I&O's Summary    11 May 2021 07:01  -  12 May 2021 07:00  --------------------------------------------------------  IN: 0 mL / OUT: 2000 mL / NET: -2000 mL        
ENT PROGRESS NOTE    85M with history of multiple tongue cancers, underwent left partial glossectomy in 2004, path showed SCC. In 2017 developed a new lesion on the left tongue and underwent an additional partial glossectomy, path showed dysplasia. Patient did not undergo neck dissection with either procedure and has never had chemotherapy or radiation. He presented with a 2 month history of a new lesion on the left tongue, biopsy in the office showed SCC. A PET scan was done which did not show any clinically positive leticia disease but had an incidental finding of a left parotid tumor. 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     The patient is now s/p left neck dissection, excision of left parotid tumor, and left partial glossectomy on 5/6/21. Doing well post op.     INTERVAL:    5/8: Pt required NG tube advancement yesterday with 2nd CXR showing good placement. Had some throat pain which has improved overnight. This morning, pt had some blood-tinged sputum which he has been spitting up. Straight cath 9pm and 5am overnight.    PAST MEDICAL & SURGICAL HISTORY:  Vertigo (ICD9 780.4)    Cancer of Tongue (ICD9 141.9)    Cataract    Hypothyroidism    COPD (chronic obstructive pulmonary disease)  claustrophobic in small places    MVP (mitral valve prolapse)    Tricuspid Murmur    Liver hemangioma    BPH (Benign Prostatic Hyperplasia)    Decreased renal function  only 1 kidney, left    Claustrophobia    Oral cancer    Prediabetes    History of Tonsillectomy (ICD9 V45.89)  removed the cancer from tounge 2004    S/P cataract extraction and insertion of intraocular lens  right    Retinal detachment  right &amp; left- laser surgery  bilateral    S/P tonsillectomy    History of kidney surgery  removal right 2013    History of surgery  tongue 2004, 2017      Chlorhexidine Gluconate (Pruritus; Rash)  NSAIDs (Other)  sulfa drugs (Urticaria; Rash)    MEDICATIONS  (STANDING):  ampicillin/sulbactam  IVPB 1.5 Gram(s) IV Intermittent every 6 hours  calcium carbonate   Suspension 500 milliGRAM(s) Enteral Tube daily  doxazosin 1 milliGRAM(s) Oral every 24 hours  heparin   Injectable 5000 Unit(s) SubCutaneous every 12 hours  levothyroxine 50 MICROGram(s) Enteral Tube daily  lidocaine 4% Topical Solution 1 Application(s) Topical once  oxymetazoline 0.05% Nasal Spray 2 Spray(s) Both Nostrils once  polyethylene glycol 3350 17 Gram(s) Oral every 24 hours  senna 2 Tablet(s) Oral at bedtime  sodium chloride 0.9%. 1000 milliLiter(s) (80 mL/Hr) IV Continuous <Continuous>    MEDICATIONS  (PRN):  acetaminophen    Suspension .. 650 milliGRAM(s) Enteral Tube every 6 hours PRN Temp greater or equal to 38C (100.4F), Mild Pain (1 - 3)  hydrALAZINE 10 milliGRAM(s) Oral every 4 hours PRN Systolic blood pressure > 170  melatonin Liquid 1 milliGRAM(s) Oral at bedtime PRN Sleep  ondansetron Injectable 4 milliGRAM(s) IV Push every 6 hours PRN Nausea and/or Vomiting      Objective    ICU Vital Signs Last 24 Hrs  T(C): 36.5 (07 May 2021 04:44), Max: 36.7 (06 May 2021 22:20)  T(F): 97.7 (07 May 2021 04:44), Max: 98 (06 May 2021 22:20)  HR: 68 (07 May 2021 05:14) (65 - 80)  BP: 142/78 (07 May 2021 05:14) (120/61 - 167/92)  BP(mean): 84 (07 May 2021 00:00) (84 - 122)  ABP: --  ABP(mean): --  RR: 18 (07 May 2021 05:14) (11 - 22)  SpO2: 97% (07 May 2021 05:14) (94% - 100%)      NAD, A&Ox3  Breathing comfortably   NGT in place on R side   Oral cavity with blood tinged secretions, s/p partial glossectomy   Neck soft, flat, incision with baci, BABITA to low continuous wall suction with serosanguinous output      05-07    133<L>  |  100  |  26<H>  ----------------------------<  113<H>  4.9   |  23  |  1.43<H>    Ca    8.6      07 May 2021 06:14  Phos  3.0     05-07  Mg     2.1     05-07        I&O's Summary    06 May 2021 07:01  -  07 May 2021 07:00  --------------------------------------------------------  IN: 1350 mL / OUT: 1380 mL / NET: -30 mL      
ENT PROGRESS NOTE    85M with history of multiple tongue cancers, underwent left partial glossectomy in 2004, path showed SCC. In 2017 developed a new lesion on the left tongue and underwent an additional partial glossectomy, path showed dysplasia. Patient did not undergo neck dissection with either procedure and has never had chemotherapy or radiation. He presented with a 2 month history of a new lesion on the left tongue, biopsy in the office showed SCC. A PET scan was done which did not show any clinically positive leticia disease but had an incidental finding of a left parotid tumor. 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     The patient is now s/p left neck dissection, excision of left parotid tumor, and left partial glossectomy on 5/6/21. Doing well post op.     INTERVAL:  5/7: Pt required NG tube advancement yesterday with 2nd CXR showing good placement. Had some throat pain which has improved overnight. This morning, pt had some blood-tinged sputum which he has been spitting up. Straight cath 9pm and 5am overnight.  5/8: NAEON. Still having some secretions from mouth, but less than before. No other concerns. Passed TOV yesterday  5/9: NAEON. SLP did not clear for diet yesterday and would like MBS on Mon. Likely 2/2 postop swelling. Continuing to urinate. NG was replaced at bedside because it clogged overnight.  5/10: NAEON.  Pain in neck improving.  Noted some intermittent sharp globus sensation with swallowing only.  No other acute complaints.      PAST MEDICAL & SURGICAL HISTORY:  Vertigo (ICD9 780.4)  Cancer of Tongue (ICD9 141.9)  Cataract  Hypothyroidism  COPD (chronic obstructive pulmonary disease)  claustrophobic in small places  MVP (mitral valve prolapse)  Tricuspid Murmur  Liver hemangioma  BPH (Benign Prostatic Hyperplasia)  Decreased renal function, only 1 kidney, left  Claustrophobia  Oral cancer  Prediabetes  History of Tonsillectomy (ICD9 V45.89), removed the cancer from tounge 2004  S/P cataract extraction and insertion of intraocular lens, right  Retinal detachment, right &amp; left- laser surgery  bilateral  S/P tonsillectomy  History of kidney surgery, removal right 2013  History of surgery, tongue 2004, 2017    ALLERGIES:  Chlorhexidine Gluconate (Pruritus; Rash)  NSAIDs (Other)  sulfa drugs (Urticaria; Rash)    MEDICATIONS  (STANDING):  ampicillin/sulbactam  IVPB 1.5 Gram(s) IV Intermittent every 6 hours  calcium carbonate   Suspension 500 milliGRAM(s) Enteral Tube daily  doxazosin 1 milliGRAM(s) Oral every 24 hours  heparin   Injectable 5000 Unit(s) SubCutaneous every 8 hours  lactated ringers. 1000 milliLiter(s) (75 mL/Hr) IV Continuous <Continuous>  levothyroxine 50 MICROGram(s) Enteral Tube daily  polyethylene glycol 3350 17 Gram(s) Oral every 24 hours  senna 2 Tablet(s) Oral at bedtime    MEDICATIONS  (PRN):  acetaminophen    Suspension .. 650 milliGRAM(s) Enteral Tube every 6 hours PRN Temp greater or equal to 38C (100.4F), Mild Pain (1 - 3)  artificial tears (preservative free) Ophthalmic Solution 2 Drop(s) Both EYES three times a day PRN Dry Eyes  hydrALAZINE 10 milliGRAM(s) Oral every 4 hours PRN Systolic blood pressure > 170  melatonin Liquid 1 milliGRAM(s) Oral at bedtime PRN Sleep  ondansetron Injectable 4 milliGRAM(s) IV Push every 6 hours PRN Nausea and/or Vomiting      Objective  ICU Vital Signs Last 24 Hrs  T(C): 36.5 (10 May 2021 04:24), Max: 37.1 (09 May 2021 14:04)  T(F): 97.7 (10 May 2021 04:24), Max: 98.7 (09 May 2021 14:04)  HR: 66 (10 May 2021 04:29) (66 - 88)  BP: 151/87 (10 May 2021 04:29) (146/87 - 159/93)  BP(mean): 113 (10 May 2021 04:29) (100 - 121)  ABP: --  ABP(mean): --  RR: 17 (10 May 2021 04:29) (16 - 18)  SpO2: 95% (10 May 2021 04:29) (95% - 97%)      Physical Exam:  NAD, A&Ox3  Breathing comfortably   NGT in place on R side   Oral cavity s/p partial glossectomy, no active bleeding, incisions clean and intact  Neck soft, flat, incision with baci, BABITA to self suction draining minimal serosanguinous fluid      I&O's Detail    09 May 2021 07:01  -  10 May 2021 07:00  --------------------------------------------------------  IN:    Free Water: 750 mL    Jevity 1.2: 1200 mL    Lactated Ringers: 300 mL  Total IN: 2250 mL    OUT:    Bulb (mL): 18 mL    Voided (mL): 1100 mL  Total OUT: 1118 mL    Total NET: 1132 mL        LABS:  BMP:  135  |  100  |  26<H>  ----------------------------<  82  4.2   |  24  |  1.40<H>  Ca    8.9      10 May 2021 06:10
ENT PROGRESS NOTE    85M with history of multiple tongue cancers, underwent left partial glossectomy in 2004, path showed SCC. In 2017 developed a new lesion on the left tongue and underwent an additional partial glossectomy, path showed dysplasia. Patient did not undergo neck dissection with either procedure and has never had chemotherapy or radiation. He presented with a 2 month history of a new lesion on the left tongue, biopsy in the office showed SCC. A PET scan was done which did not show any clinically positive leticia disease but had an incidental finding of a left parotid tumor. 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     The patient is now s/p left neck dissection, excision of left parotid tumor, and left partial glossectomy on 5/6/21. Doing well post op.     INTERVAL:    5/8: Pt required NG tube advancement yesterday with 2nd CXR showing good placement. Had some throat pain which has improved overnight. This morning, pt had some blood-tinged sputum which he has been spitting up. Straight cath 9pm and 5am overnight.  5/9: NAEON. Still having some secretions from mouth, but less than before. No other concerns. Passed TOV yesterday    PAST MEDICAL & SURGICAL HISTORY:  Vertigo (ICD9 780.4)    Cancer of Tongue (ICD9 141.9)    Cataract    Hypothyroidism    COPD (chronic obstructive pulmonary disease)  claustrophobic in small places    MVP (mitral valve prolapse)    Tricuspid Murmur    Liver hemangioma    BPH (Benign Prostatic Hyperplasia)    Decreased renal function  only 1 kidney, left    Claustrophobia    Oral cancer    Prediabetes    History of Tonsillectomy (ICD9 V45.89)  removed the cancer from tounge 2004    S/P cataract extraction and insertion of intraocular lens  right    Retinal detachment  right &amp; left- laser surgery  bilateral    S/P tonsillectomy    History of kidney surgery  removal right 2013    History of surgery  tongue 2004, 2017      Chlorhexidine Gluconate (Pruritus; Rash)  NSAIDs (Other)  sulfa drugs (Urticaria; Rash)    MEDICATIONS  (STANDING):  ampicillin/sulbactam  IVPB 1.5 Gram(s) IV Intermittent every 6 hours  calcium carbonate   Suspension 500 milliGRAM(s) Enteral Tube daily  doxazosin 1 milliGRAM(s) Oral every 24 hours  heparin   Injectable 5000 Unit(s) SubCutaneous every 8 hours  levothyroxine 50 MICROGram(s) Enteral Tube daily  polyethylene glycol 3350 17 Gram(s) Oral every 24 hours  senna 2 Tablet(s) Oral at bedtime    MEDICATIONS  (PRN):  acetaminophen    Suspension .. 650 milliGRAM(s) Enteral Tube every 6 hours PRN Temp greater or equal to 38C (100.4F), Mild Pain (1 - 3)  artificial tears (preservative free) Ophthalmic Solution 2 Drop(s) Both EYES three times a day PRN Dry Eyes  hydrALAZINE 10 milliGRAM(s) Oral every 4 hours PRN Systolic blood pressure > 170  melatonin Liquid 1 milliGRAM(s) Oral at bedtime PRN Sleep  ondansetron Injectable 4 milliGRAM(s) IV Push every 6 hours PRN Nausea and/or Vomiting      Objective    ICU Vital Signs Last 24 Hrs  T(C): 36.6 (08 May 2021 09:07), Max: 37.1 (07 May 2021 22:22)  T(F): 97.8 (08 May 2021 09:07), Max: 98.8 (07 May 2021 22:22)  HR: 68 (08 May 2021 08:00) (68 - 86)  BP: 145/77 (08 May 2021 08:00) (127/61 - 157/79)  BP(mean): 106 (08 May 2021 08:00) (88 - 110)  ABP: --  ABP(mean): --  RR: 18 (08 May 2021 08:00) (16 - 18)  SpO2: 97% (08 May 2021 08:00) (96% - 99%)        NAD, A&Ox3  Breathing comfortably   NGT in place on R side   Oral cavity with blood tinged secretions, s/p partial glossectomy   Neck soft, flat, incision with baci, BABITA to self suction        05-08    134<L>  |  98  |  25<H>  ----------------------------<  121<H>  4.2   |  25  |  1.44<H>    Ca    8.7      08 May 2021 08:05  Phos  3.0     05-07  Mg     2.1     05-07        I&O's Summary    07 May 2021 07:01  -  08 May 2021 07:00  --------------------------------------------------------  IN: 2510 mL / OUT: 1980 mL / NET: 530 mL          
ENT PROGRESS NOTE    85M with history of multiple tongue cancers, underwent left partial glossectomy in 2004, path showed SCC. In 2017 developed a new lesion on the left tongue and underwent an additional partial glossectomy, path showed dysplasia. Patient did not undergo neck dissection with either procedure and has never had chemotherapy or radiation. He presented with a 2 month history of a new lesion on the left tongue, biopsy in the office showed SCC. A PET scan was done which did not show any clinically positive leticia disease but had an incidental finding of a left parotid tumor. 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     The patient is now s/p left neck dissection, excision of left parotid tumor, and left partial glossectomy on 5/6/21. Doing well post op.     INTERVAL:  5/7: Pt required NG tube advancement yesterday with 2nd CXR showing good placement. Had some throat pain which has improved overnight. This morning, pt had some blood-tinged sputum which he has been spitting up. Straight cath 9pm and 5am overnight.  5/8: NAEON. Still having some secretions from mouth, but less than before. No other concerns. Passed TOV yesterday  5/9: NAEON. SLP did not clear for diet yesterday and would like MBS on Mon. Likely 2/2 postop swelling. Continuing to urinate. NG was replaced at bedside because it clogged overnight.  5/10: NAEON.  Pain in neck improving.  Noted some intermittent sharp globus sensation with swallowing only.  No other acute complaints.  5/11: BABITA drain removed yesterday.  No issues overnight.  Noted IV infiltration this morning.  Pain well controlled, no other acute complaints.      PAST MEDICAL & SURGICAL HISTORY:  Vertigo (ICD9 780.4)  Cancer of Tongue (ICD9 141.9)  Cataract  Hypothyroidism  COPD (chronic obstructive pulmonary disease)  claustrophobic in small places  MVP (mitral valve prolapse)  Tricuspid Murmur  Liver hemangioma  BPH (Benign Prostatic Hyperplasia)  Decreased renal function, only 1 kidney, left  Claustrophobia  Oral cancer  Prediabetes  History of Tonsillectomy (ICD9 V45.89), removed the cancer from tounge 2004  S/P cataract extraction and insertion of intraocular lens, right  Retinal detachment, right &amp; left- laser surgery  bilateral  S/P tonsillectomy  History of kidney surgery, removal right 2013  History of surgery, tongue 2004, 2017      ALLERGIES:  Chlorhexidine Gluconate (Pruritus; Rash)  NSAIDs (Other)  sulfa drugs (Urticaria; Rash)      MEDICATIONS  (STANDING):  amoxicillin  120 mG/mL/clavulanate Suspension 875 milliGRAM(s) Oral two times a day  calcium carbonate   Suspension 500 milliGRAM(s) Enteral Tube daily  doxazosin 1 milliGRAM(s) Oral every 24 hours  heparin   Injectable 5000 Unit(s) SubCutaneous every 8 hours  levothyroxine 50 MICROGram(s) Enteral Tube daily  polyethylene glycol 3350 17 Gram(s) Oral every 24 hours  senna 2 Tablet(s) Oral at bedtime    MEDICATIONS  (PRN):  acetaminophen    Suspension .. 650 milliGRAM(s) Enteral Tube every 6 hours PRN Temp greater or equal to 38C (100.4F), Mild Pain (1 - 3)  artificial tears (preservative free) Ophthalmic Solution 2 Drop(s) Both EYES three times a day PRN Dry Eyes  hydrALAZINE 10 milliGRAM(s) Oral every 4 hours PRN Systolic blood pressure > 170  melatonin Liquid 1 milliGRAM(s) Oral at bedtime PRN Sleep  ondansetron    Tablet 4 milliGRAM(s) Oral every 6 hours PRN Nausea and/or Vomiting      Objective  ICU Vital Signs Last 24 Hrs  T(C): 37.2 (11 May 2021 05:09), Max: 37.2 (11 May 2021 05:09)  T(F): 99 (11 May 2021 05:09), Max: 99 (11 May 2021 05:09)  HR: 78 (11 May 2021 03:34) (78 - 104)  BP: 140/80 (11 May 2021 03:34) (104/72 - 150/72)  BP(mean): 104 (11 May 2021 03:34) (83 - 104)  ABP: --  ABP(mean): --  RR: 18 (11 May 2021 03:34) (16 - 18)  SpO2: 94% (11 May 2021 03:34) (94% - 96%)      Physical Exam:  NAD, A&Ox3  Breathing comfortably   NGT in place on R side   Oral cavity s/p partial glossectomy, no active bleeding, incisions clean and intact  Neck soft, flat, incision c/d/i, BABITA site well healing      I&O's Detail    10 May 2021 07:01  -  11 May 2021 07:00  --------------------------------------------------------  IN:    Free Water: 750 mL    Jevity 1.2: 900 mL    Oral Fluid: 960 mL  Total IN: 2610 mL    OUT:    Bulb (mL): 10 mL    Voided (mL): 1550 mL  Total OUT: 1560 mL    Total NET: 1050 mL

## 2021-05-12 NOTE — DISCHARGE NOTE PROVIDER - DETAILS OF MALNUTRITION DIAGNOSIS/DIAGNOSES
This patient has been assessed with a concern for Malnutrition and was treated during this hospitalization for the following Nutrition diagnosis/diagnoses:     -  05/07/2021: Moderate protein-calorie malnutrition   -  05/07/2021: Underweight (BMI < 19)

## 2021-05-12 NOTE — DISCHARGE NOTE PROVIDER - NSDCFUADDINST_GEN_ALL_CORE_FT
Take tylenol as needed for pain.    General Discharge Instructions:  Please resume all regular home medications unless specifically advised not to take a particular medication. Also, please take any new medications as prescribed.  Please get plenty of rest, continue to ambulate several times per day, and drink adequate amounts of fluids. Avoid lifting weights greater than 5-10 lbs until you follow-up with your surgeon, who will instruct you further regarding activity restrictions.  Avoid driving or operating heavy machinery while taking pain medications.  Please follow-up with your surgeon and Primary Care Provider (PCP) as advised.  Incision Care:  *Please call your doctor or nurse practitioner if you have increased pain, swelling, redness, or drainage from the incision site.  *Avoid swimming and baths until your follow-up appointment.  *You may shower, and wash surgical incisions with a mild soap and warm water. Gently pat the area dry.  *If you have steri-strips, they will fall off on their own. Please remove any remaining strips 7-10 days after surgery.    Warning Signs:  Please call your doctor or nurse practitioner if you experience the following:  *You experience new chest pain, pressure, squeezing or tightness.  *New or worsening cough, shortness of breath, or wheeze.  *If you are vomiting and cannot keep down fluids or your medications.  *You are getting dehydrated due to continued vomiting, diarrhea, or other reasons. Signs of dehydration include dry mouth, rapid heartbeat, or feeling dizzy or faint when standing.  *You experience burning when you urinate, have blood in your urine, or experience a discharge.  *Your pain is not improving within 8-12 hours or is not gone within 24 hours.  *You have shaking chills, or fever greater than 101.5 degrees Fahrenheit or 38 degrees Celsius.  *Any change in your symptoms, or any new symptoms that concern you.

## 2021-05-12 NOTE — DISCHARGE NOTE PROVIDER - NSDCMRMEDTOKEN_GEN_ALL_CORE_FT
Allegra 24 Hour Allergy oral tablet: 1 tab(s) orally once a day  calcium 500m tab(s) orally once a day in am  Flomax 0.4 mg oral capsule: 1 cap(s) orally once a day  levothyroxine 50 mcg (0.05 mg) oral tablet: 1 tab(s) orally once a day in am  vitamin D: 1000 international unit(s) orally once a day in am

## 2021-05-12 NOTE — PROGRESS NOTE ADULT - ASSESSMENT
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     Neuro: pain control prn via NGT. TYLENOL - avoid opioids or NSAIDs because of hx of nephrectomy  Resp: breathing comfortably on room air  CV: no history, not on any meds, hydralazine prn for SBP > 170   FEN/GI: NPO. Will start tube feeds today based on nutrition reccs. Can give meds through NGt. Stool softeners  : s/p 2x straight cath overnight, TOV at 1pm. Hx of BPH.  ID: Unasyn q6h   PPx: SQH, SCDs   Wound: baci to incisions BID, BABITA to low continuous wall suction, saline rinses QID Start saline swish and swallow in mouth  PT: ordered, ambulate as tolerated  
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     Neuro: pain control prn via NGT. TYLENOL - avoid opioids or NSAIDs because of hx of nephrectomy  Resp: breathing comfortably on room air  CV: no history, not on any meds, hydralazine prn for SBP > 170   FEN/GI: On NGT feeds. Can give meds through NGT. Stool softeners.  Plan for MBS today  : I&Os  ID: Augmentin BID  PPx: SQH, SCDs   Wound: baci to incisions BID, saline rinses QID  PT: no home needs
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     Neuro: pain control prn via NGT. TYLENOL - avoid opioids or NSAIDs because of hx of nephrectomy  Resp: breathing comfortably on room air  CV: no history, not on any meds, hydralazine prn for SBP > 170   FEN/GI: On NGT feeds. Can give meds through NGT. Stool softeners.  Plan for MBS today  : I&Os  ID: Unasyn q6h,  PPx: SQH, SCDs   Wound: baci to incisions BID, BABITA to self suction, saline rinses QID  PT: no home needs  
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     Neuro: pain control prn via NGT. TYLENOL - avoid opioids or NSAIDs because of hx of nephrectomy  Resp: breathing comfortably on room air  CV: no history, not on any meds, hydralazine prn for SBP > 170   FEN/GI: On NGt feeds. Can give meds through NGt. Stool softeners  : s/p allison  ID: Unasyn q6h   PPx: SQH, SCDs   Wound: baci to incisions BID, BABITA to low continuous wall suction, saline rinses QID Start saline swish and swallow in mouth  PT: ordered, ambulate as tolerated  
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     Neuro: pain control prn. TYLENOL - avoid opioids or NSAIDs because of hx of nephrectomy  Resp: breathing comfortably on room air  CV: no history, not on any meds, hydralazine prn for SBP > 170   FEN/GI: Puree/thin liquid diet  : I&Os  ID: NTD  PPx: SQH, SCDs   Wound: baci to incisions BID, saline rinses QID  PT: no home needs
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     Neuro: pain control prn via NGT. TYLENOL - avoid opioids or NSAIDs because of hx of nephrectomy  Resp: breathing comfortably on room air  CV: no history, not on any meds, hydralazine prn for SBP > 170   FEN/GI: On NGt feeds - CXR this morning for replaced NGt in correct place. Can give meds through NGt. Stool softeners  : s/p allison  ID: Unasyn q6h   PPx: SQH, SCDs   Wound: baci to incisions BID, BABITA to low continuous wall suction, saline rinses QID Start saline swish and swallow in mouth  PT: ordered, ambulate as tolerated

## 2021-05-12 NOTE — PROGRESS NOTE ADULT - NUTRITIONAL ASSESSMENT
This patient has been assessed with a concern for Malnutrition and has been determined to have a diagnosis/diagnoses of Moderate protein-calorie malnutrition and Underweight (BMI < 19).    This patient is being managed with:   Diet NPO with Tube Feed-  Tube Feeding Modality: Nasogastric  Jevity 1.2 John (JEVITY1.2RTH)  Total Volume for 24 Hours (mL): 1200  Total Number of Cans: 5  Bolus  Total Volume of Bolus (mL):  300  Total # of Feeds: 4  Tube Feed Frequency: Every 6 hours   Tube Feed Start Time: 19:00  Bolus Feed Rate (mL per Hour): 300   Bolus Feed Duration (in Hours): 1  Free Water Flush   Total Volume per Flush (mL): 250   Frequency: Every 8 Hours  Entered: May  8 2021  6:27PM    
This patient has been assessed with a concern for Malnutrition and has been determined to have a diagnosis/diagnoses of Moderate protein-calorie malnutrition and Underweight (BMI < 19).    This patient is being managed with:   Diet Dysphagia 1 Pureed-Thin Liquids-  Tube Feeding Modality: Nasogastric  Jevity 1.2 John (JEVITY1.2RTH)  Total Volume for 24 Hours (mL): 1200  Total Number of Cans: 5  Bolus  Total Volume of Bolus (mL):  300  Total # of Feeds: 4  Tube Feed Frequency: Every 6 hours   Tube Feed Start Time: 01:00  Bolus Feed Rate (mL per Hour): 300   Bolus Feed Duration (in Hours): 1  Entered: May 10 2021  8:02PM    
This patient has been assessed with a concern for Malnutrition and has been determined to have a diagnosis/diagnoses of Moderate protein-calorie malnutrition and Underweight (BMI < 19).    This patient is being managed with:   Diet Dysphagia 1 Pureed-Thin Liquids-  Tube Feeding Modality: Nasogastric  Jevity 1.2 John (JEVITY1.2RTH)  Total Volume for 24 Hours (mL): 1200  Total Number of Cans: 5  Bolus  Total Volume of Bolus (mL):  300  Total # of Feeds: 4  Tube Feed Frequency: Every 6 hours   Tube Feed Start Time: 01:00  Bolus Feed Rate (mL per Hour): 300   Bolus Feed Duration (in Hours): 1  Entered: May 10 2021  8:02PM    
This patient has been assessed with a concern for Malnutrition and has been determined to have a diagnosis/diagnoses of Moderate protein-calorie malnutrition and Underweight (BMI < 19).    This patient is being managed with:   Diet NPO with Tube Feed-  Tube Feeding Modality: Nasogastric  Jevity 1.2 John (JEVITY1.2RTH)  Total Volume for 24 Hours (mL): 1200  Total Number of Cans: 5  Continuous  Starting Tube Feed Rate {mL per Hour}: 10  Increase Tube Feed Rate by (mL): 10     Every hour  Until Goal Tube Feed Rate (mL per Hour): 50  Tube Feed Duration (in Hours): 24  Tube Feed Start Time: 00:00  Free Water Flush   Total Volume per Flush (mL): 250   Frequency: Every 8 Hours  Entered: May  6 2021 10:58PM    
This patient has been assessed with a concern for Malnutrition and has been determined to have a diagnosis/diagnoses of Moderate protein-calorie malnutrition and Underweight (BMI < 19).    This patient is being managed with:   Diet NPO with Tube Feed-  Tube Feeding Modality: Nasogastric  Jevity 1.2 John (JEVITY1.2RTH)  Total Volume for 24 Hours (mL): 1200  Total Number of Cans: 5  Bolus  Total Volume of Bolus (mL):  300  Total # of Feeds: 4  Tube Feed Frequency: Every 6 hours   Tube Feed Start Time: 19:00  Bolus Feed Rate (mL per Hour): 300   Bolus Feed Duration (in Hours): 1  Free Water Flush   Total Volume per Flush (mL): 250   Frequency: Every 8 Hours  Entered: May  8 2021  6:27PM

## 2021-05-12 NOTE — PROGRESS NOTE ADULT - REASON FOR ADMISSION
Post-operative care

## 2021-05-12 NOTE — DISCHARGE NOTE PROVIDER - HOSPITAL COURSE
85M with history of multiple tongue cancers, underwent left partial glossectomy in 2004, path showed SCC. In 2017 developed a new lesion on the left tongue and underwent an additional partial glossectomy, path showed dysplasia. Patient did not undergo neck dissection with either procedure and has never had chemotherapy or radiation. He presented with a 2 month history of a new lesion on the left tongue, biopsy in the office showed SCC. A PET scan was done which did not show any clinically positive leticia disease but had an incidental finding of a left parotid tumor. 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     The patient is now s/p left neck dissection, excision of left parotid tumor, and left partial glossectomy on 5/6/21. Doing well post op.     INTERVAL:  5/7: Pt required NG tube advancement yesterday with 2nd CXR showing good placement. Had some throat pain which has improved overnight. This morning, pt had some blood-tinged sputum which he has been spitting up. Straight cath 9pm and 5am overnight.  5/8: NAEON. Still having some secretions from mouth, but less than before. No other concerns. Passed TOV yesterday  5/9: NAEON. SLP did not clear for diet yesterday and would like MBS on Mon. Likely 2/2 postop swelling. Continuing to urinate. NG was replaced at bedside because it clogged overnight.  5/10: NAEON.  Pain in neck improving.  Noted some intermittent sharp globus sensation with swallowing only.  No other acute complaints.  5/11: BABITA drain removed yesterday.  No issues overnight.  Noted IV infiltration this morning.  Pain well controlled, no other acute complaints.  5/12: NAEON. L post-auricular sialocele aspirated (3cc of fluid). Pain improved. No other complaints.    Pt is breathing comfortably on room air. Tolerating diet with no nausea/vomiting. Pain well controlled on current regimen. Ready for discharge home.

## 2021-05-12 NOTE — DISCHARGE NOTE NURSING/CASE MANAGEMENT/SOCIAL WORK - PATIENT PORTAL LINK FT
You can access the FollowMyHealth Patient Portal offered by [facility] by registering at the following website: [email or web address]. By joining [facility]’s FollowMyHealth portal, you will also be able to view your health information using other applications (apps) compatible with our system.

## 2021-05-12 NOTE — DISCHARGE NOTE PROVIDER - CARE PROVIDER_API CALL
Mariano Dick)  Otolaryngology  [address] [place] [address]  Phone: [phone]  Fax: [phone]  Follow Up Time:

## 2021-05-13 PROBLEM — R73.03 PREDIABETES: Chronic | Status: ACTIVE | Noted: 2021-05-05

## 2021-05-13 PROBLEM — C06.9 MALIGNANT NEOPLASM OF MOUTH, UNSPECIFIED: Chronic | Status: ACTIVE | Noted: 2021-05-05

## 2021-05-13 PROBLEM — N28.9 DISORDER OF KIDNEY AND URETER, UNSPECIFIED: Chronic | Status: ACTIVE | Noted: 2017-01-30

## 2021-05-17 ENCOUNTER — NON-APPOINTMENT (OUTPATIENT)
Age: 86
End: 2021-05-17

## 2021-05-17 ENCOUNTER — APPOINTMENT (OUTPATIENT)
Dept: OTOLARYNGOLOGY | Facility: CLINIC | Age: 86
End: 2021-05-17
Payer: MEDICARE

## 2021-05-17 VITALS
WEIGHT: 112 LBS | BODY MASS INDEX: 17.58 KG/M2 | SYSTOLIC BLOOD PRESSURE: 121 MMHG | TEMPERATURE: 97.9 F | HEART RATE: 79 BPM | HEIGHT: 67 IN | DIASTOLIC BLOOD PRESSURE: 74 MMHG | OXYGEN SATURATION: 96 %

## 2021-05-17 PROCEDURE — 99024 POSTOP FOLLOW-UP VISIT: CPT

## 2021-05-18 NOTE — DISCUSSION/SUMMARY
[Cancer Type / Location / Histology Subtype: ________] : Cancer Type / Location / Histology Subtype: [unfilled] [Diagnosis Date (year): ____] : Diagnosis Date (year): [unfilled] [I] : I [Surgery] : Surgery: Yes [Surgery Date(s) (year): ____] : Surgery Date(s) (year): [unfilled] [Surgical Procedure / Location / Findings: _________] : Surgical Procedure / Location / Findings: [unfilled] [Follow up with Surgeon in _____] : Follow up with Surgeon in [unfilled] [Radiation] : Radiation: No [Systemic Therapy (chemotherapy, hormonal therapy, other)] : Systemic Therapy (chemotherapy, hormonal therapy, other): No [Need for onging (adjuvant) treatment for cancer?] : Need for onging (adjuvant) treatment for cancer? No [FreeTextEntry1] : Prior history of left partial glossectomies in 2004 (SCC) and 2017 (high grade dysplasia) [de-identified] : Referral for physical therapy [FreeTextEntry7] : Online support group info given [FreeTextEntry8] : Mary Rivas NP [FreeTextEntry9] : 5/17/21

## 2021-05-19 DIAGNOSIS — Z90.5 ACQUIRED ABSENCE OF KIDNEY: ICD-10-CM

## 2021-05-19 DIAGNOSIS — J44.9 CHRONIC OBSTRUCTIVE PULMONARY DISEASE, UNSPECIFIED: ICD-10-CM

## 2021-05-19 DIAGNOSIS — E44.0 MODERATE PROTEIN-CALORIE MALNUTRITION: ICD-10-CM

## 2021-05-19 DIAGNOSIS — Z88.4 ALLERGY STATUS TO ANESTHETIC AGENT: ICD-10-CM

## 2021-05-19 DIAGNOSIS — R73.03 PREDIABETES: ICD-10-CM

## 2021-05-19 DIAGNOSIS — H91.92 UNSPECIFIED HEARING LOSS, LEFT EAR: ICD-10-CM

## 2021-05-19 DIAGNOSIS — D11.0 BENIGN NEOPLASM OF PAROTID GLAND: ICD-10-CM

## 2021-05-19 DIAGNOSIS — N40.0 BENIGN PROSTATIC HYPERPLASIA WITHOUT LOWER URINARY TRACT SYMPTOMS: ICD-10-CM

## 2021-05-19 DIAGNOSIS — D00.00 CARCINOMA IN SITU OF ORAL CAVITY, UNSPECIFIED SITE: ICD-10-CM

## 2021-05-19 DIAGNOSIS — Z88.2 ALLERGY STATUS TO SULFONAMIDES: ICD-10-CM

## 2021-05-19 DIAGNOSIS — E03.9 HYPOTHYROIDISM, UNSPECIFIED: ICD-10-CM

## 2021-05-19 DIAGNOSIS — C02.3 MALIGNANT NEOPLASM OF ANTERIOR TWO-THIRDS OF TONGUE, PART UNSPECIFIED: ICD-10-CM

## 2021-05-21 ENCOUNTER — APPOINTMENT (OUTPATIENT)
Dept: OTOLARYNGOLOGY | Facility: CLINIC | Age: 86
End: 2021-05-21
Payer: MEDICARE

## 2021-05-21 PROCEDURE — 99024 POSTOP FOLLOW-UP VISIT: CPT

## 2021-05-21 NOTE — PHYSICAL EXAM
[Normal] : orientation to person, place, and time: normal [de-identified] : well healing neck scar, superior left fluctuant fullness- 20ml of clear fluids were aspirated. sutures removed. [de-identified] : well healing fibrin covered glossectomy site [de-identified] : 7, 11, 12 function intact. reduced L shoulder ROM compared to right

## 2021-05-21 NOTE — ASSESSMENT
[FreeTextEntry1] : 86 yo M, s/p 5/6/21 left partial glossectomy, primary closure, MRND, parotidectomy, for rec. [id number] SCC, case was presented at [facility] HN TB, 5/17/21, recommendation for observation. 20 ml aspirated from parotid bed.\par We thoroughly discussed the pathology results with the patient.\par \par today we drained 35ML of serosanguineous liquid,no signs of infection, \par \par Plan:\par - F/u as needed for draiange\par - PT for shoulder. \par - f/u 2 months\par - Please apply vitamin E cream over scar, avoid sun exposure.\par - You can wash the scar with water and soap, scrub gently.\par - Seen with SLP today.\par - SCP discussed and given to pt. Distress screen performed.\par - RTC sooner should any worrisome symptoms develop.\par - Pt verbalized understanding of above.\par

## 2021-05-21 NOTE — HISTORY OF PRESENT ILLNESS
[de-identified] : 84yo M, presented with left tongue lesion X 2 months. He underwent left partial glossectomy X 2 (2004, 2017). 2004 path c/w scc, 2017 high grade dysplasia. ND wasn't performed. no adjuvant tx was given. He has a remote >35y ago hx of smoking, denies EtOH. he denies swallowing/voice/breathing complaints, denies WL/NS/f. \par He underwent right nephrectomy for renal scc, and report he can't do CT+Contrast.\par  \par 5/6/21 underwent left partial glossectomy, primary closure, MRND, parotidectomy\par Final path shows 1.2 cm, DOI 3mm, negative margins, negative PNI negative LVI, 0/31 LN, tail of parotid Warthin's tumor. \par Pt. denies pain, reports mild swelling of superior part of incision, fluctuant (was started postop antibiotic for Sialocele), denies discharge from incision.  [FreeTextEntry1] : today we drained 35ML of serosanguineous liquid,no signs of infection,

## 2021-05-21 NOTE — REASON FOR VISIT
[Post-Operative Visit] : a post-operative visit [Family Member] : family member [FreeTextEntry1] : 5/6/21 underwent left partial glossectomy, primary closure, MRND, parotidectomy

## 2021-05-21 NOTE — REVIEW OF SYSTEMS
[As Noted in HPI] : as noted in HPI [Negative] : Constitutional [FreeTextEntry1] : Distress screen performed 5/17/21: Anxiety 2, Depression 1

## 2021-05-24 ENCOUNTER — APPOINTMENT (OUTPATIENT)
Dept: OTOLARYNGOLOGY | Facility: CLINIC | Age: 86
End: 2021-05-24
Payer: MEDICARE

## 2021-05-24 VITALS
SYSTOLIC BLOOD PRESSURE: 127 MMHG | DIASTOLIC BLOOD PRESSURE: 79 MMHG | HEART RATE: 71 BPM | OXYGEN SATURATION: 99 % | WEIGHT: 112 LBS | TEMPERATURE: 97.8 F | HEIGHT: 67 IN | BODY MASS INDEX: 17.58 KG/M2

## 2021-05-24 PROCEDURE — 99024 POSTOP FOLLOW-UP VISIT: CPT

## 2021-05-25 RX ORDER — CEPHALEXIN 500 MG/1
500 CAPSULE ORAL
Qty: 14 | Refills: 0 | Status: DISCONTINUED | COMMUNITY
Start: 2021-05-14 | End: 2021-05-25

## 2021-05-25 NOTE — PHYSICAL EXAM
[Normal] : orientation to person, place, and time: normal [de-identified] : well healing neck scar, left neck with fluctuant fullness [de-identified] : well healing fibrin covered glossectomy site

## 2021-05-25 NOTE — ASSESSMENT
[FreeTextEntry1] : 84 yo M, s/p 5/6/21 left partial glossectomy, primary closure, MRND, parotidectomy, for rec. [id number] SCC, case was presented at [facility] HN TB, 5/17/21, recommendation for observation. He is here for repeat aspiration of parotid bed.\par \par Aseptically prepped with alcohol pad, using an 18 g needle, we drained 50ML of serosanguineous liquid today, no signs of infection, bandaid applied.\par \par Plan:\par - F/u prn for aspiration.\par - Pt to start PT for shoulder. \par - f/u 2 months\par - RTC sooner should any worrisome symptoms develop.\par - Pt verbalized understanding of above.\par

## 2021-05-25 NOTE — HISTORY OF PRESENT ILLNESS
[de-identified] : 86yo M, presented with left tongue lesion X 2 months. He underwent left partial glossectomy X 2 (2004, 2017). 2004 path c/w scc, 2017 high grade dysplasia. ND wasn't performed. no adjuvant tx was given. He has a remote >35y ago hx of smoking, denies EtOH. he denies swallowing/voice/breathing complaints, denies WL/NS/f. \par He underwent right nephrectomy for renal scc, and report he can't do CT+Contrast.\par  \par 5/6/21 underwent left partial glossectomy, primary closure, MRND, parotidectomy\par Final path shows 1.2 cm, DOI 3mm, negative margins, negative PNI negative LVI, 0/31 LN, tail of parotid Warthin's tumor. \par Pt. denies pain, reports mild swelling of superior part of incision, fluctuant (was started postop antibiotic for Sialocele), denies discharge from incision.  [FreeTextEntry1] : Today we drained 50ML of serosanguineous liquid,no signs of infection.

## 2021-05-26 ENCOUNTER — APPOINTMENT (OUTPATIENT)
Dept: OTOLARYNGOLOGY | Facility: CLINIC | Age: 86
End: 2021-05-26
Payer: MEDICARE

## 2021-05-26 VITALS
HEART RATE: 61 BPM | DIASTOLIC BLOOD PRESSURE: 71 MMHG | TEMPERATURE: 97.9 F | SYSTOLIC BLOOD PRESSURE: 125 MMHG | WEIGHT: 112 LBS | OXYGEN SATURATION: 98 % | BODY MASS INDEX: 17.58 KG/M2 | HEIGHT: 67 IN

## 2021-05-26 PROCEDURE — 99024 POSTOP FOLLOW-UP VISIT: CPT

## 2021-05-26 NOTE — PHYSICAL EXAM
[Normal] : orientation to person, place, and time: normal [de-identified] : well healing neck scar, superior left fluctuant fullness- 20ml of clear fluids were aspirated. sutures removed. [de-identified] : well healing fibrin covered glossectomy site [de-identified] : 7, 11, 12 function intact. reduced L shoulder ROM compared to right

## 2021-05-26 NOTE — REASON FOR VISIT
[Post-Operative Visit] : a post-operative visit [Family Member] : family member [FreeTextEntry2] : tongue lesion [FreeTextEntry1] : 5/6/21 underwent left partial glossectomy, primary closure, MRND, parotidectomy

## 2021-05-26 NOTE — ASSESSMENT
[FreeTextEntry1] : 84 yo M, s/p 5/6/21 left partial glossectomy, primary closure, MRND, parotidectomy, for rec. [id number] SCC, case was presented at [facility] HN TB, 5/17/21, recommendation for observation. 20 ml aspirated from parotid bed.\par We thoroughly discussed the pathology results with the patient.\par \par Plan:\par - PT for shoulder. \par - f/u 2 months\par - Please apply vitamin E cream over scar, avoid sun exposure.\par - You can wash the scar with water and soap, scrub gently.\par - Seen with SLP today.\par - SCP discussed and given to pt. Distress screen performed.\par - RTC sooner should any worrisome symptoms develop.\par - Pt verbalized understanding of above.\par

## 2021-05-26 NOTE — ADDENDUM
[FreeTextEntry1] : Speech Pathology:\par \par Pt seen today in conjunction with Dr. Dick in clinic,\par \par Pt s/p L partial glossectomy with primary closure, MRND, and parotidectomy on 5/6/21.  He was seen by our service for MBSS on 5/11 ehich showed mild oropharyngeal dysphagia with inefficient oral bolus formation and clearance.  Pharyngeal phase characterized by aspiration of thin liquid x 1, otherwise WFL.  As such, pt recommended a pureed diet with thin liquids diet.\par \par Today, pt arrived with his 2 daughters.  Pt reported throat pain has now resolved.  He is tolerating pureed diet with thin liquids well.  He also c/o reduced articulatory precision.  Exam today showed moderately impaired lingual ROM.  He was instructed in performing lingual ROM exercises to optimize speech and swallow functions.  He was also recommended to start trying moist chopped solids as tolerated instead of just purees to gradually advance his diet.  Suggestions for preparation of chopped foods provided.  Pt will try chopped solids and report back tolerance next visit.\par \par Porfirio Borjas MS, CCC-SLP, BCS-S\par Supervisor, Speech Pathology

## 2021-05-26 NOTE — HISTORY OF PRESENT ILLNESS
[de-identified] : 84yo M, presented with left tongue lesion X 2 months. He underwent left partial glossectomy X 2 (2004, 2017). 2004 path c/w scc, 2017 high grade dysplasia. ND wasn't performed. no adjuvant tx was given. He has a remote >35y ago hx of smoking, denies EtOH. he denies swallowing/voice/breathing complaints, denies WL/NS/f. \par He underwent right nephrectomy for renal scc, and report he can't do CT+Contrast.\par   [FreeTextEntry1] : 5/6/21 underwent left partial glossectomy, primary closure, MRND, parotidectomy\par Final path shows 1.2 cm, DOI 3mm, negative margins, negative PNI negative LVI, 0/31 LN, tail of parotid Warthin's tumor. \par Pt. denies pain, reports mild swelling of superior part of incision, fluctuant (was started postop antibiotic for Sialocele), denies discharge from incision.

## 2021-05-27 NOTE — HISTORY OF PRESENT ILLNESS
[de-identified] : 84yo M, presented with left tongue lesion X 2 months. He underwent left partial glossectomy X 2 (2004, 2017). 2004 path c/w scc, 2017 high grade dysplasia. ND wasn't performed. no adjuvant tx was given. He has a remote >35y ago hx of smoking, denies EtOH. he denies swallowing/voice/breathing complaints, denies WL/NS/f. \par He underwent right nephrectomy for renal scc, and report he can't do CT+Contrast.\par  \par 5/6/21 underwent left partial glossectomy, primary closure, MRND, parotidectomy\par Final path shows 1.2 cm, DOI 3mm, negative margins, negative PNI negative LVI, 0/31 LN, tail of parotid Warthin's tumor. \par Pt. denies pain, reports mild swelling of superior part of incision, fluctuant (was started postop antibiotic for Sialocele), denies discharge from incision.  [FreeTextEntry1] : Today we drained 50ML of serosanguineous liquid, no signs of infection.

## 2021-05-27 NOTE — PHYSICAL EXAM
[Normal] : orientation to person, place, and time: normal [de-identified] : well healing neck scar, left neck with fluctuant fullness [de-identified] : well healing fibrin covered glossectomy site

## 2021-05-27 NOTE — ASSESSMENT
[FreeTextEntry1] : 86 yo M, s/p 5/6/21 left partial glossectomy, primary closure, MRND, parotidectomy, for rec. [id number] SCC, case was presented at [facility] HN TB, 5/17/21, recommendation for observation. He is here for repeat aspiration of parotid bed.\par \par Aseptically prepped with alcohol pad, using an 18 g needle, we drained 50ML of serosanguineous liquid today, no signs of infection, bandaid applied.\par \par Plan:\par - F/u prn for aspiration.\par - Pt to start PT for shoulder. \par - f/u 2 months\par - RTC sooner should any worrisome symptoms develop.\par - Pt verbalized understanding of above.\par

## 2021-06-03 ENCOUNTER — APPOINTMENT (OUTPATIENT)
Dept: OTOLARYNGOLOGY | Facility: CLINIC | Age: 86
End: 2021-06-03
Payer: MEDICARE

## 2021-06-03 VITALS
BODY MASS INDEX: 17.58 KG/M2 | HEIGHT: 67 IN | SYSTOLIC BLOOD PRESSURE: 124 MMHG | WEIGHT: 112 LBS | TEMPERATURE: 97.9 F | HEART RATE: 64 BPM | DIASTOLIC BLOOD PRESSURE: 74 MMHG

## 2021-06-03 PROCEDURE — 10021 FNA BX W/O IMG GDN 1ST LES: CPT

## 2021-06-03 PROCEDURE — 99204 OFFICE O/P NEW MOD 45 MIN: CPT | Mod: 25

## 2021-06-03 NOTE — PHYSICAL EXAM
[Midline] : trachea located in midline position [Normal] : no rashes [de-identified] : +sermoa at left parotid

## 2021-06-03 NOTE — END OF VISIT
[FreeTextEntry3] : I personally saw and examined HERACLIO MENDOZA in detail. I spoke to STEPHANIE Jackson regarding the assessment and plan of care.  I preformed the procedures and I reviewed the above assessment and plan of care, and agree. I have made changes in changes in the body of the note where appropriate.\par \par

## 2021-06-03 NOTE — PROCEDURE
[FreeTextEntry3] : Aseptically prepped with alcohol pad, using an 25 g needle, we drained 30ML of serosanguineous liquid today, no signs of infection, bandaid applied.\par

## 2021-06-03 NOTE — ASSESSMENT
[FreeTextEntry1] : 86 yo M, s/p 5/6/21 left partial glossectomy, primary closure, MRND, parotidectomy, presents for repeat aspiration of parotid bed.\par -  drained 30ML of serosanguineous liquid today\par - advised face lift dressing \par compressive dressing placed \par - f/u monday \par

## 2021-06-03 NOTE — CONSULT LETTER
[Please see my note below.] : Please see my note below. [FreeTextEntry1] : Dear Dr. JORJE DUPREE \par I had the pleasure of evaluating your patient HERACLIO MENDOZA, thank you for allowing us to participate in their care. please see full note detailing our visit below.\par If you have any questions, please do not hesitate to call me and I would be happy to discuss further. \par \par Jake Bahena M.D.\par Attending Physician,  \par Department of Otolaryngology - Head and Neck Surgery\par [facility] \par Office: [phone]\par Fax: [phone]\par \par

## 2021-06-03 NOTE — HISTORY OF PRESENT ILLNESS
[de-identified] : 86 y/o M with hx of renal cancer, s/p removal of right kideny,  and tongue cancer with left partial glossectomy, with parotidectomy had surgery in May 6th.2021 where 31 left lymph nodes and left parotid tumor was removed by Dr. Manuel Dick in [facility]. Since then has 6 aspirations, needs to go get aspirated every 2-3 days, ranges from 20cc to 50+cc from left parotid. \par pt's daughter also states would like to evaluate the incision site, feels there is something there. \par pt denies pain\par \par \par \par \par

## 2021-06-07 ENCOUNTER — APPOINTMENT (OUTPATIENT)
Dept: FAMILY MEDICINE | Facility: CLINIC | Age: 86
End: 2021-06-07

## 2021-06-07 ENCOUNTER — APPOINTMENT (OUTPATIENT)
Dept: OTOLARYNGOLOGY | Facility: CLINIC | Age: 86
End: 2021-06-07
Payer: MEDICARE

## 2021-06-07 VITALS
BODY MASS INDEX: 17.58 KG/M2 | DIASTOLIC BLOOD PRESSURE: 69 MMHG | HEIGHT: 67 IN | SYSTOLIC BLOOD PRESSURE: 104 MMHG | HEART RATE: 63 BPM | WEIGHT: 112 LBS | TEMPERATURE: 97.4 F

## 2021-06-07 PROCEDURE — 10021 FNA BX W/O IMG GDN 1ST LES: CPT | Mod: LT

## 2021-06-07 PROCEDURE — 99213 OFFICE O/P EST LOW 20 MIN: CPT | Mod: 25

## 2021-06-07 NOTE — PHYSICAL EXAM
[Midline] : trachea located in midline position [Normal] : no rashes [de-identified] : +sermoa at left parotid

## 2021-06-07 NOTE — ASSESSMENT
[FreeTextEntry1] : 86 yo M, s/p 5/6/21 left partial glossectomy, primary closure, MRND, parotidectomy, presents for repeat aspiration of parotid bed.\par -  drained 16ML of serosanguineous liquid today\par - advised face lift dressing \par compressive dressing placed \par - f/u monday \par

## 2021-06-07 NOTE — CONSULT LETTER
[Please see my note below.] : Please see my note below. [FreeTextEntry1] : Dear Dr. JORJE DUPREE \par I had the pleasure of evaluating your patient HERACLIO MENDOZA, thank you for allowing us to participate in their care. please see full note detailing our visit below.\par If you have any questions, please do not hesitate to call me and I would be happy to discuss further. \par \par Jake Bahena M.D.\par Attending Physician,  \par Department of Otolaryngology - Head and Neck Surgery\par [facility] \par Office: [phone]\par Fax: [phone]\par \par

## 2021-06-07 NOTE — HISTORY OF PRESENT ILLNESS
[de-identified] : 86 y/o M with hx of renal cancer, s/p removal of right kideny,  and tongue cancer with left partial glossectomy, with parotidectomy had surgery in May 6th.2021 where 31 left lymph nodes and left parotid tumor was removed by Dr. Manuel Dick in [facility]. Since then has 6 aspirations, needs to go get aspirated every 2-3 days, ranges from 20cc to 50+cc from left parotid. \par pt's daughter also states would like to evaluate the incision site, feels there is something there. \par pt denies pain\par \par \par \par \par  [FreeTextEntry1] : pt following up s/p aspiration of left parotid seroma, states the compression did help and seems to be improving. \par Pt was okay during the weekend, states the dressing came off. \par also stated he feels he has more saliva in the mouth than before. \par

## 2021-06-07 NOTE — PROCEDURE
[FreeTextEntry3] : Aseptically prepped with alcohol pad, using an 22 g needle, we drained 16mL of serosanguineous liquid today, no signs of infection, bandaid applied. compressive dressing applied \par

## 2021-06-10 ENCOUNTER — APPOINTMENT (OUTPATIENT)
Dept: OTOLARYNGOLOGY | Facility: CLINIC | Age: 86
End: 2021-06-10
Payer: MEDICARE

## 2021-06-10 VITALS
TEMPERATURE: 97.3 F | HEART RATE: 73 BPM | HEIGHT: 67 IN | SYSTOLIC BLOOD PRESSURE: 118 MMHG | BODY MASS INDEX: 17.58 KG/M2 | DIASTOLIC BLOOD PRESSURE: 77 MMHG | WEIGHT: 112 LBS

## 2021-06-10 PROCEDURE — 99213 OFFICE O/P EST LOW 20 MIN: CPT | Mod: 25

## 2021-06-10 PROCEDURE — 10021 FNA BX W/O IMG GDN 1ST LES: CPT | Mod: LT

## 2021-06-10 NOTE — PROCEDURE
[FreeTextEntry3] : Aseptically prepped with alcohol pad, using an 22 g needle, we drained 7mL of serosanguineous liquid today, no signs of infection, bandaid applied. declined compressive dressing due to heat

## 2021-06-10 NOTE — ASSESSMENT
[FreeTextEntry1] : 86 yo M, s/p 5/6/21 left partial glossectomy, primary closure, MRND, parotidectomy, presents for repeat aspiration of parotid bed.\par -  drained 7ML of serous liquid today\par - advised face lift dressing \par - f/u monday \par

## 2021-06-10 NOTE — CONSULT LETTER
[Please see my note below.] : Please see my note below. [FreeTextEntry1] : Dear Dr. JORJE DUPREE \par I had the pleasure of evaluating your patient HERACLIO MENDOZA, thank you for allowing us to participate in their care. please see full note detailing our visit below.\par If you have any questions, please do not hesitate to call me and I would be happy to discuss further. \par \par Jake Bahena M.D.\par Attending Physician,  \par Department of Otolaryngology - Head and Neck Surgery\par [facility] \par Office: [phone]\par Fax: [phone]\par \par

## 2021-06-10 NOTE — HISTORY OF PRESENT ILLNESS
[de-identified] : 86 y/o M with hx of renal cancer, s/p removal of right kideny,  and tongue cancer with left partial glossectomy, with parotidectomy had surgery in May 6th.2021 where 31 left lymph nodes and left parotid tumor was removed by Dr. Manuel Dick in [facility]. Since then has 6 aspirations, needs to go get aspirated every 2-3 days, ranges from 20cc to 50+cc from left parotid. \par pt's daughter also states would like to evaluate the incision site, feels there is something there. \par pt denies pain\par \par \par \par \par  [FreeTextEntry1] : pt following up s/p aspiration of left parotid seroma, states the compression did help and seems to be improving. \par Pt was okay during the weekend, states the dressing came off. \par also stated he feels he has more saliva in the mouth than before. \par

## 2021-06-10 NOTE — PHYSICAL EXAM
[de-identified] : +sermoa at left parotid  [Midline] : trachea located in midline position [Normal] : no rashes

## 2021-06-14 ENCOUNTER — APPOINTMENT (OUTPATIENT)
Dept: OTOLARYNGOLOGY | Facility: CLINIC | Age: 86
End: 2021-06-14
Payer: MEDICARE

## 2021-06-14 VITALS
SYSTOLIC BLOOD PRESSURE: 110 MMHG | BODY MASS INDEX: 17.27 KG/M2 | TEMPERATURE: 97.6 F | HEART RATE: 60 BPM | DIASTOLIC BLOOD PRESSURE: 70 MMHG | HEIGHT: 67 IN | WEIGHT: 110 LBS

## 2021-06-14 PROCEDURE — 10021 FNA BX W/O IMG GDN 1ST LES: CPT | Mod: LT

## 2021-06-14 PROCEDURE — 99214 OFFICE O/P EST MOD 30 MIN: CPT | Mod: 25

## 2021-06-14 NOTE — HISTORY OF PRESENT ILLNESS
[de-identified] : 84 y/o M with hx of renal cancer, s/p removal of right kideny,  and tongue cancer with left partial glossectomy, with parotidectomy had surgery in May 6th.2021 where 31 left lymph nodes and left parotid tumor was removed by Dr. Manuel Dick in [facility]. Since then has 6 aspirations, needs to go get aspirated every 2-3 days, ranges from 20cc to 50+cc from left parotid. \par pt's daughter also states would like to evaluate the incision site, feels there is something there. \par pt denies pain\par \par \par \par \par  [FreeTextEntry1] : pt following up s/p aspiration of left parotid seroma, states the compression did help and seems to be improving. \par Pt was okay during the weekend, states the dressing came off. \par also stated he feels he has more saliva in the mouth than before. \par \par sensation seems to be coming back left side but is now feeling some discomfort there\par no fevers \par no d/c or redness \par \par \par also new c/o hearing loss b/l, worse on the left difficult to hear conversations\par no Vertigo, tinnitus, pain, drainage \par

## 2021-06-14 NOTE — PROCEDURE
[FreeTextEntry3] : Aseptically prepped with alcohol pad, using an 22 g needle, we drained 4mL of clear guerita liquid today, no signs of infection, bandaid applied. declined compressive dressing due to heat

## 2021-06-14 NOTE — PHYSICAL EXAM
[Midline] : trachea located in midline position [Normal] : no rashes [de-identified] : +sermoa at left parotid

## 2021-06-14 NOTE — CONSULT LETTER
[Please see my note below.] : Please see my note below. [FreeTextEntry1] : Dear Dr. JORJE DUPREE \par I had the pleasure of evaluating your patient HERACLIO MENDOZA, thank you for allowing us to participate in their care. please see full note detailing our visit below.\par If you have any questions, please do not hesitate to call me and I would be happy to discuss further. \par \par Jake Bahena M.D.\par Attending Physician,  \par Department of Otolaryngology - Head and Neck Surgery\par [facility] \par Office: [phone]\par Fax: [phone]\par \par

## 2021-06-14 NOTE — ASSESSMENT
[FreeTextEntry1] : 84 yo M, s/p 5/6/21 left partial glossectomy, primary closure, MRND, parotidectomy, presents for repeat aspiration of parotid bed.\par -  drained 4ML of serous liquid today\par - advised face lift dressing \par - f/u monday \par \par \par \par pt with b/l hearing loss \par audiogram, did not have time today will consider next visit \par may benefit from HA

## 2021-06-21 ENCOUNTER — APPOINTMENT (OUTPATIENT)
Dept: OTOLARYNGOLOGY | Facility: CLINIC | Age: 86
End: 2021-06-21
Payer: MEDICARE

## 2021-06-21 VITALS
SYSTOLIC BLOOD PRESSURE: 121 MMHG | DIASTOLIC BLOOD PRESSURE: 71 MMHG | BODY MASS INDEX: 17.58 KG/M2 | WEIGHT: 112 LBS | HEART RATE: 81 BPM | HEIGHT: 67 IN | TEMPERATURE: 97.9 F

## 2021-06-21 PROCEDURE — 10021 FNA BX W/O IMG GDN 1ST LES: CPT | Mod: LT

## 2021-06-21 PROCEDURE — 99213 OFFICE O/P EST LOW 20 MIN: CPT | Mod: 25

## 2021-06-21 NOTE — PROCEDURE
[FreeTextEntry3] : Aseptically prepped with alcohol pad, using an 22 g needle, we drained 3.5mL of clear guerita liquid today, no signs of infection, bandaid applied. declined compressive dressing due to heat

## 2021-06-21 NOTE — HISTORY OF PRESENT ILLNESS
[de-identified] : 86 y/o M with hx of renal cancer, s/p removal of right kideny,  and tongue cancer with left partial glossectomy, with parotidectomy had surgery in May 6th.2021 where 31 left lymph nodes and left parotid tumor was removed by Dr. Manuel Dick in [facility]. Since then has 6 aspirations, needs to go get aspirated every 2-3 days, ranges from 20cc to 50+cc from left parotid. \par pt's daughter also states would like to evaluate the incision site, feels there is something there. \par pt denies pain\par \par \par \par \par  [FreeTextEntry1] : pt following up s/p aspiration of left parotid seroma, states healing well, last time only 4ml was drained. \par also stated he feels he has more saliva in the mouth than before. \par \par sensation seems to be coming back left side but is now feeling some discomfort there\par no fevers \par no d/c or redness \par \par \par

## 2021-06-21 NOTE — PHYSICAL EXAM
[Midline] : trachea located in midline position [Normal] : no rashes [de-identified] : +sermoa at left parotid

## 2021-06-21 NOTE — ASSESSMENT
[FreeTextEntry1] : 84 yo M, s/p 5/6/21 left partial glossectomy, primary closure, MRND, parotidectomy, presents for repeat aspiration of parotid bed.\par -  drained 3.5ML of serous liquid today\par - advised face lift dressing \par - f/u one week \par \par \par \par pt with b/l hearing loss \par audiogram, did not have time today will consider next time, would like the seroma to be fully healed. \par may benefit from HA

## 2021-06-21 NOTE — CONSULT LETTER
[Please see my note below.] : Please see my note below. [FreeTextEntry1] : Dear Dr. JORJE DUPREE \par I had the pleasure of evaluating your patient HERACLIO MENDOZA, thank you for allowing us to participate in their care. please see full note detailing our visit below.\par If you have any questions, please do not hesitate to call me and I would be happy to discuss further. \par \par Jake Bahena M.D.\par Attending Physician,  \par Department of Otolaryngology - Head and Neck Surgery\par [facility] \par Office: [phone]\par Fax: [phone]\par \par

## 2021-06-28 ENCOUNTER — APPOINTMENT (OUTPATIENT)
Dept: OTOLARYNGOLOGY | Facility: CLINIC | Age: 86
End: 2021-06-28
Payer: MEDICARE

## 2021-06-28 VITALS
DIASTOLIC BLOOD PRESSURE: 74 MMHG | HEIGHT: 67 IN | WEIGHT: 112 LBS | SYSTOLIC BLOOD PRESSURE: 113 MMHG | BODY MASS INDEX: 17.58 KG/M2 | HEART RATE: 68 BPM | TEMPERATURE: 97.6 F

## 2021-06-28 DIAGNOSIS — K11.6 MUCOCELE OF SALIVARY GLAND: ICD-10-CM

## 2021-06-28 PROCEDURE — 99214 OFFICE O/P EST MOD 30 MIN: CPT

## 2021-06-28 NOTE — CONSULT LETTER
[Please see my note below.] : Please see my note below. [FreeTextEntry1] : Dear Dr. JORJE DUPREE \par I had the pleasure of evaluating your patient HERACLIO MENDOZA, thank you for allowing us to participate in their care. please see full note detailing our visit below.\par If you have any questions, please do not hesitate to call me and I would be happy to discuss further. \par \par Jake Bahena M.D.\par Attending Physician,  \par Department of Otolaryngology - Head and Neck Surgery\par [facility] \par Office: [phone]\par Fax: [phone]\par \par

## 2021-06-28 NOTE — ASSESSMENT
[FreeTextEntry1] : 84 yo M, s/p 5/6/21 left partial glossectomy, primary closure, MRND, parotidectomy, presents for repeat aspiration of parotid bed. Seroma all but resolved, no need to further aspirate at this time\par - Follow up with Dr. Dick\par \par \par pt with b/l hearing loss, likely SNHL, will consider audio if would consider a  HA \par may benefit from HA

## 2021-06-28 NOTE — HISTORY OF PRESENT ILLNESS
[de-identified] : 86 y/o M with hx of renal cancer, s/p removal of right kideny,  and tongue cancer with left partial glossectomy, with parotidectomy had surgery in May 6th.2021 where 31 left lymph nodes and left parotid tumor was removed by Dr. Manuel Dick in [facility]. Since then has 6 aspirations, needs to go get aspirated every 2-3 days, ranges from 20cc to 50+cc from left parotid. \par pt's daughter also states would like to evaluate the incision site, feels there is something there. \par pt denies pain\par \par \par \par \par  [FreeTextEntry1] : pt following up s/p aspiration of left parotid seroma, states healing well, feels much flatter\par also stated he feels he has more saliva in the mouth than before. \par \par heairng stable\par \par sensation seems to be coming back left side but is now feeling some discomfort there\par no fevers \par no d/c or redness \par \par \par

## 2021-06-28 NOTE — PHYSICAL EXAM
[de-identified] : nearly flat left parotid bed  [Midline] : trachea located in midline position [Normal] : no rashes

## 2021-06-30 ENCOUNTER — APPOINTMENT (OUTPATIENT)
Dept: FAMILY MEDICINE | Facility: CLINIC | Age: 86
End: 2021-06-30
Payer: MEDICARE

## 2021-06-30 VITALS
BODY MASS INDEX: 17.58 KG/M2 | OXYGEN SATURATION: 98 % | HEIGHT: 67 IN | WEIGHT: 112 LBS | DIASTOLIC BLOOD PRESSURE: 71 MMHG | TEMPERATURE: 97.3 F | HEART RATE: 78 BPM | SYSTOLIC BLOOD PRESSURE: 115 MMHG

## 2021-06-30 PROCEDURE — 99213 OFFICE O/P EST LOW 20 MIN: CPT

## 2021-06-30 NOTE — PHYSICAL EXAM
[Normal] : affect was normal and insight and judgment were intact [de-identified] : neck incisions appear well healed, no more swelling.

## 2021-06-30 NOTE — HISTORY OF PRESENT ILLNESS
[FreeTextEntry1] : f/u CKD after surgery [de-identified] : 86 yo M with hx renal ca s/p right nephrectomy, tongue cancer, CKD presents for f/u. \par \par Pt has been on high protein diet after having tongue cancer removed. He had partial glossectomy with parotidectomy. 31 lymph nodes were removed and required 12 aspirations to drain all the fluid. He is concerned about his kidney function due to the high protein he has been eating.\par \par Pt has been doing PT for left arm.\par \par Feels loratadine not really helping so much. Sneezing, itchy nose and itchy eyes.

## 2021-07-01 LAB
ALBUMIN SERPL ELPH-MCNC: 4.3 G/DL
ALP BLD-CCNC: 67 U/L
ALT SERPL-CCNC: 11 U/L
ANION GAP SERPL CALC-SCNC: 13 MMOL/L
APPEARANCE: CLEAR
AST SERPL-CCNC: 18 U/L
BACTERIA: NEGATIVE
BILIRUB SERPL-MCNC: 0.3 MG/DL
BILIRUBIN URINE: NEGATIVE
BLOOD URINE: NEGATIVE
BUN SERPL-MCNC: 32 MG/DL
CALCIUM SERPL-MCNC: 9.3 MG/DL
CHLORIDE SERPL-SCNC: 100 MMOL/L
CO2 SERPL-SCNC: 21 MMOL/L
COLOR: YELLOW
CREAT SERPL-MCNC: 1.71 MG/DL
GLUCOSE QUALITATIVE U: NEGATIVE
GLUCOSE SERPL-MCNC: 134 MG/DL
HYALINE CASTS: 0 /LPF
KETONES URINE: NEGATIVE
LEUKOCYTE ESTERASE URINE: NEGATIVE
MICROSCOPIC-UA: NORMAL
NITRITE URINE: NEGATIVE
PH URINE: 6
POTASSIUM SERPL-SCNC: 5.6 MMOL/L
PROT SERPL-MCNC: 6.8 G/DL
PROTEIN URINE: NEGATIVE
RED BLOOD CELLS URINE: 1 /HPF
SODIUM SERPL-SCNC: 134 MMOL/L
SPECIFIC GRAVITY URINE: 1.02
SQUAMOUS EPITHELIAL CELLS: 0 /HPF
UROBILINOGEN URINE: NORMAL
WHITE BLOOD CELLS URINE: 0 /HPF

## 2021-08-02 ENCOUNTER — APPOINTMENT (OUTPATIENT)
Dept: OTOLARYNGOLOGY | Facility: CLINIC | Age: 86
End: 2021-08-02
Payer: MEDICARE

## 2021-08-02 VITALS
DIASTOLIC BLOOD PRESSURE: 89 MMHG | HEIGHT: 67 IN | WEIGHT: 113 LBS | HEART RATE: 63 BPM | OXYGEN SATURATION: 99 % | SYSTOLIC BLOOD PRESSURE: 162 MMHG | BODY MASS INDEX: 17.74 KG/M2

## 2021-08-02 DIAGNOSIS — Z98.890 OTHER SPECIFIED POSTPROCEDURAL STATES: ICD-10-CM

## 2021-08-02 PROCEDURE — 99024 POSTOP FOLLOW-UP VISIT: CPT

## 2021-08-03 PROBLEM — Z98.890 STATUS POST DISSECTION OF NECK: Status: ACTIVE | Noted: 2021-05-17

## 2021-08-09 NOTE — REASON FOR VISIT
[Other: _____] : [unfilled] [Post-Operative Visit] : a post-operative visit [FreeTextEntry2] : s/p 5/6/21 left partial glossectomy, primary closure, L MRND, left parotidectomy

## 2021-08-09 NOTE — ASSESSMENT
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  Provided new PT referral.\par - Pt aware of my impending departure.  He already has an appointment scheduled with Shin Peace to establish care.\par - Recommend COVID-19 vaccine booster if indicated.\par - Pt verbalized understanding of above.\par

## 2021-08-09 NOTE — ADDENDUM
[FreeTextEntry1] : Speech Pathology:\par \par Pt seen today in conjunction with Dr. Dick in clinic.  \par \par Pt has continued to progress in terms of speech and swallowing since surgery in May 2021.  He is now on full po diet of purees and thin liquids.  Denied difficulty swallowing with these consistencies.  He is still edentulous at present and has not tried solid foods yet.  I advised for him to try very soft solids ( e.g. cooked vegetables, sponge cake, fish, egg) to see if he is able to "gum" this consistency.  He was instructed to continue with his lingual ROM exercises to optimize lingual functions for speech and swallow purposes. \par \par Porfirio Borjas MS, CCC-SLP, BCS-S\par Supervisor, Speech Pathology

## 2021-08-09 NOTE — HISTORY OF PRESENT ILLNESS
[de-identified] : 86yo M, presented with left tongue lesion X 2 months. He underwent left partial glossectomy X 2 (2004, 2017). 2004 path c/w scc, 2017 high grade dysplasia. ND wasn't performed. no adjuvant tx was given. He has a remote >35y ago hx of smoking, denies EtOH. he denies swallowing/voice/breathing complaints, denies WL/NS/f. \par He underwent right nephrectomy for renal scc, and report he can't do CT+Contrast.\par  \par 5/6/21 underwent left partial glossectomy, primary closure, MRND, parotidectomy\par Final path shows 1.2 cm, DOI 3mm, negative margins, negative PNI negative LVI, 0/31 LN, tail of parotid Warthin's tumor. \par Pt. denies pain, reports mild swelling of superior part of incision, fluctuant (was started postop antibiotic for Sialocele), denies discharge from incision. \par \par He has had multiple sialocele aspirations by myself and Jake Bahena.\par  [FreeTextEntry1] : He returns for follow-up today, doing well overall.  He continues with home PT, good over head shoulder movement, reports limited horizontal abduction of left shoulder.  His family asks whether he should get the COVID-19 vaccine booster if indicated.

## 2021-08-13 ENCOUNTER — OUTPATIENT (OUTPATIENT)
Dept: OUTPATIENT SERVICES | Facility: HOSPITAL | Age: 86
LOS: 1 days | Discharge: HOME | End: 2021-08-13

## 2021-08-13 ENCOUNTER — APPOINTMENT (OUTPATIENT)
Dept: OTOLARYNGOLOGY | Facility: CLINIC | Age: 86
End: 2021-08-13
Payer: MEDICARE

## 2021-08-13 VITALS
WEIGHT: 113 LBS | HEIGHT: 67 IN | HEART RATE: 78 BPM | DIASTOLIC BLOOD PRESSURE: 90 MMHG | BODY MASS INDEX: 17.74 KG/M2 | SYSTOLIC BLOOD PRESSURE: 144 MMHG

## 2021-08-13 DIAGNOSIS — Z98.890 OTHER SPECIFIED POSTPROCEDURAL STATES: Chronic | ICD-10-CM

## 2021-08-13 PROCEDURE — 99213 OFFICE O/P EST LOW 20 MIN: CPT

## 2021-08-13 NOTE — REASON FOR VISIT
[Initial Consultation] : an initial consultation for [FreeTextEntry2] : referred by Dr Mariano Dick for tongue cancer surveillance

## 2021-08-13 NOTE — PHYSICAL EXAM
[de-identified] : Status post left parotidectomy and neck dissection [de-identified] : Right parotid right submandibular gland normal, status post left parotidectomy left selective neck dissection [Normal] : mucosa is normal [Impaired mobility] : impaired mobility [Removed] : palatine tonsils previously removed [de-identified] : Left tongue floor mouth some scar tissue from previous resection well-healed no evidence of leukoplakia erythroplakia ulceration or nodularity consistent with a recurrence he appears to have no evidence of disease at the local site.  [de-identified] : No evidence of other obvious abnormality of the mucosal surfaces in the oral cavity

## 2021-08-13 NOTE — HISTORY OF PRESENT ILLNESS
[de-identified] : 85 year old male referred by Dr Mariano Dick for tongue cancer, follow up for s/p 5/6/21 left partial glossectomy, primary closure, L MRND, left parotidectomy for benign cyst.  States feeling no pain, voice is louder and deeper,  currently on a soft diet and tolerating eating and drinking well.  Has white cream coating on tongue that causes a foul odor.  Patient denies dysphagia, odynophagia, dyspnea, or otalgia. Patient was doing  physical therapy 2x a week, completed sessions, complained of left shoulder pain, difficulty moving shoulder back.  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  He had a history of smoking but quit 40 years ago.  No adjuvant radiation was administered he has no history of other adjuvant therapies.  Of note he has had a nephrectomy for renal cell carcinoma.  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  Left  parotid gland [place]'s tumor  per Pathology pending.\par \par \par PET/ CT FDG Skull to thigh 4/19/21\par IMPRESSION: Abnormal FDG-PET/CT scan.\par 1. Focus of increased FDG activity in lateral aspect left oral tongue may correspond to patient's suspected recurrent tumor.\par 2. Hypermetabolic nodule in posterior aspect of left parotid gland may represent a benign or malignant salivary gland neoplasm or an intraparotid lymph node. Further characterization may be performed with ultrasound-guided percutaneous needle biopsy.\par 3. Right nephrectomy.\par \par Pathology 4/30/21\par IMPRESSION:\par Ultrasound-guided fine needle aspiration biopsy of a left parotid lesion was performed with no immediate complications.

## 2021-08-18 NOTE — PRE-OP CHECKLIST - HEART RATE (BEATS/MIN)
74 Mucosal Advancement Flap Text: Given the location of the defect, shape of the defect and the proximity to free margins a mucosal advancement flap was deemed most appropriate. Incisions were made with a 15 blade scalpel in the appropriate fashion along the cutaneous vermilion border and the mucosal lip. The remaining actinically damaged mucosal tissue was excised.  The mucosal advancement flap was then elevated to the gingival sulcus with care taken to preserve the neurovascular structures and advanced into the primary defect. Care was taken to ensure that precise realignment of the vermilion border was achieved.

## 2021-09-01 DIAGNOSIS — C02.3 MALIGNANT NEOPLASM OF ANTERIOR TWO-THIRDS OF TONGUE, PART UNSPECIFIED: ICD-10-CM

## 2021-09-13 ENCOUNTER — NON-APPOINTMENT (OUTPATIENT)
Age: 86
End: 2021-09-13

## 2021-09-14 ENCOUNTER — RESULT REVIEW (OUTPATIENT)
Age: 86
End: 2021-09-14

## 2021-09-23 ENCOUNTER — APPOINTMENT (OUTPATIENT)
Dept: INTERNAL MEDICINE | Facility: CLINIC | Age: 86
End: 2021-09-23
Payer: MEDICARE

## 2021-09-23 VITALS
OXYGEN SATURATION: 97 % | BODY MASS INDEX: 18.52 KG/M2 | WEIGHT: 118 LBS | HEIGHT: 67 IN | RESPIRATION RATE: 12 BRPM | SYSTOLIC BLOOD PRESSURE: 132 MMHG | DIASTOLIC BLOOD PRESSURE: 85 MMHG | HEART RATE: 83 BPM | TEMPERATURE: 97.3 F

## 2021-09-23 PROCEDURE — 99214 OFFICE O/P EST MOD 30 MIN: CPT

## 2021-09-23 NOTE — PHYSICAL EXAM
[No Acute Distress] : no acute distress [Well Nourished] : well nourished [Normal Sclera/Conjunctiva] : normal sclera/conjunctiva [EOMI] : extraocular movements intact [Normal Outer Ear/Nose] : the outer ears and nose were normal in appearance [Normal Oropharynx] : the oropharynx was normal [No Lymphadenopathy] : no lymphadenopathy [Supple] : supple [No Respiratory Distress] : no respiratory distress  [No Accessory Muscle Use] : no accessory muscle use [Clear to Auscultation] : lungs were clear to auscultation bilaterally [Normal Rate] : normal rate  [Regular Rhythm] : with a regular rhythm [Normal S1, S2] : normal S1 and S2 [No Murmur] : no murmur heard [Pedal Pulses Present] : the pedal pulses are present [No Edema] : there was no peripheral edema [No Extremity Clubbing/Cyanosis] : no extremity clubbing/cyanosis [Soft] : abdomen soft [Non Tender] : non-tender [Non-distended] : non-distended [Normal Bowel Sounds] : normal bowel sounds [Normal Posterior Cervical Nodes] : no posterior cervical lymphadenopathy [Normal Anterior Cervical Nodes] : no anterior cervical lymphadenopathy [No CVA Tenderness] : no CVA  tenderness [No Spinal Tenderness] : no spinal tenderness [No Joint Swelling] : no joint swelling [Grossly Normal Strength/Tone] : grossly normal strength/tone [No Rash] : no rash [Coordination Grossly Intact] : coordination grossly intact [No Focal Deficits] : no focal deficits [Normal Gait] : normal gait [Normal Affect] : the affect was normal [Normal Insight/Judgement] : insight and judgment were intact [de-identified] : s/p left glossectomy

## 2021-09-23 NOTE — ASSESSMENT
[FreeTextEntry1] : Follow-up\par \par History of tongue cancer s/p glossectomy\par following with [facility] every 3 months\par \par hypothyroid\par cont levothyroxine 50 mcg daily\par will check TSH today\par \par History of renal ca/CKD\par will check CMP today\par \par BPH\par cont Flomax 0.4 mg daily\par \par

## 2021-09-23 NOTE — HISTORY OF PRESENT ILLNESS
[de-identified] : Dudley Nath 85 year old male accompanied by his daughter with history of renal cancer s/p right nephrectomy, tongue cancer s/p partial  glossectomy and left parotidectomy removal 5/6/2021 and CKD presents today for follow-up\par \par He feels well, offers no complaints \par He reports today for 3 month  follow-up after having partial glossectomy. He follows with [facility] Dr. Shin Peace. Currently has soft diet, which he is tolerating well. He follows with Dr. Peace every 3 months and is scheduled to have repeat PET scan 10/18/2021\par Denies CP, SOB, palpations, N/V or abdominal pain

## 2021-10-07 ENCOUNTER — APPOINTMENT (OUTPATIENT)
Dept: FAMILY MEDICINE | Facility: CLINIC | Age: 86
End: 2021-10-07

## 2021-10-21 LAB
ALBUMIN SERPL ELPH-MCNC: 4.7 G/DL
ALP BLD-CCNC: 63 U/L
ALT SERPL-CCNC: 13 U/L
ANION GAP SERPL CALC-SCNC: 17 MMOL/L
AST SERPL-CCNC: 25 U/L
BILIRUB SERPL-MCNC: 0.4 MG/DL
BUN SERPL-MCNC: 27 MG/DL
CALCIUM SERPL-MCNC: 9.8 MG/DL
CHLORIDE SERPL-SCNC: 102 MMOL/L
CO2 SERPL-SCNC: 21 MMOL/L
CREAT SERPL-MCNC: 1.72 MG/DL
GLUCOSE SERPL-MCNC: 77 MG/DL
POTASSIUM SERPL-SCNC: 4.8 MMOL/L
PROT SERPL-MCNC: 7.3 G/DL
SODIUM SERPL-SCNC: 140 MMOL/L
TSH SERPL-ACNC: 3.89 UIU/ML

## 2021-10-24 ENCOUNTER — APPOINTMENT (OUTPATIENT)
Dept: NUCLEAR MEDICINE | Facility: CLINIC | Age: 86
End: 2021-10-24
Payer: MEDICARE

## 2021-10-24 ENCOUNTER — OUTPATIENT (OUTPATIENT)
Dept: OUTPATIENT SERVICES | Facility: HOSPITAL | Age: 86
LOS: 1 days | End: 2021-10-24
Payer: MEDICARE

## 2021-10-24 DIAGNOSIS — C76.0 MALIGNANT NEOPLASM OF HEAD, FACE AND NECK: ICD-10-CM

## 2021-10-24 DIAGNOSIS — Z98.890 OTHER SPECIFIED POSTPROCEDURAL STATES: Chronic | ICD-10-CM

## 2021-10-24 PROCEDURE — 78815 PET IMAGE W/CT SKULL-THIGH: CPT

## 2021-10-24 PROCEDURE — A9552: CPT

## 2021-10-24 PROCEDURE — 78815 PET IMAGE W/CT SKULL-THIGH: CPT | Mod: 26,PS

## 2021-11-12 ENCOUNTER — APPOINTMENT (OUTPATIENT)
Dept: OTOLARYNGOLOGY | Facility: CLINIC | Age: 86
End: 2021-11-12
Payer: MEDICARE

## 2021-11-12 VITALS
WEIGHT: 115 LBS | SYSTOLIC BLOOD PRESSURE: 127 MMHG | HEIGHT: 67 IN | DIASTOLIC BLOOD PRESSURE: 79 MMHG | BODY MASS INDEX: 18.05 KG/M2 | OXYGEN SATURATION: 99 % | HEART RATE: 80 BPM

## 2021-11-12 PROCEDURE — 99213 OFFICE O/P EST LOW 20 MIN: CPT

## 2021-11-12 RX ORDER — DIAZEPAM 5 MG/1
5 TABLET ORAL
Qty: 2 | Refills: 0 | Status: COMPLETED | COMMUNITY
Start: 2021-04-12 | End: 2021-11-12

## 2021-11-12 NOTE — PHYSICAL EXAM
[Normal] : mucosa is normal [Impaired mobility] : impaired mobility [Removed] : palatine tonsils previously removed [de-identified] : Status post left parotidectomy and neck dissection [de-identified] : Right parotid right submandibular gland normal, status post left parotidectomy left selective neck dissection [de-identified] : Left tongue floor mouth some scar tissue from previous resection well-healed no evidence of leukoplakia erythroplakia ulceration or nodularity consistent with a recurrence he appears to have no evidence of disease  at the local site. Stable without changes from last examination, no symptoms.  [de-identified] : No evidence of other obvious abnormality of the mucosal surfaces in the oral cavity

## 2021-11-12 NOTE — HISTORY OF PRESENT ILLNESS
[de-identified] : 85 year old male s/p 5/6/21 left partial glossectomy, primary closure, L MRND, left parotidectomy for benign cyst. \par Patient presents for follow up and to discuss PET scan results. States occasional left neck/shoulder pain, was doing physical therapy 2x a week, completed sessions, difficulty moving shoulder back. Reports that voice is louder and deeper,  currently on a soft diet and tolerating eating and drinking well. Reports partial numbness of the tongue. Patient denies dysphagia, odynophagia, dyspnea. 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  He had a history of smoking but quit 40 years ago. No adjuvant radiation was administered he has no history of other adjuvant therapies.  Of note he has had a nephrectomy for renal cell carcinoma. 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 Interval left partial glossectomy and left neck dissection. Mild FDG avidity adjacent to the surgical bed, possibly post operative change. Please correlate clinically and with diagnostic CT or on follow-up imaging for further evaluation.\par 2. Interval left parotidectomy 3. Right nephrectomy\par \par Surgery 5/6/2021\par OPERATION:Left superficial parotidectomy, left type1 modified radical neck dissection, left peroral  glossectomy with primary closure.\par \par PREOPERATIVE DIAGNOSIS(ES):\par 1.  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  Left parotid gland Tunica's tumor.\par \par  POSTOPERATIVE DIAGNOSIS(ES):\par 1.  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  Left  parotid gland Tunica's tumor  per Pathology pending.\par \par \par PET/ CT FDG Skull to thigh 4/19/21\par IMPRESSION: Abnormal FDG-PET/CT scan.\par 1. Focus of increased FDG activity in lateral aspect left oral tongue may correspond to patient's suspected recurrent tumor.\par 2. Hypermetabolic nodule in posterior aspect of left parotid gland may represent a benign or malignant salivary gland neoplasm or an intraparotid lymph node. Further characterization may be performed with ultrasound-guided percutaneous needle biopsy.\par 3. Right nephrectomy.\par \par Pathology 4/30/21\par IMPRESSION:\par Ultrasound-guided fine needle aspiration biopsy of a left parotid lesion was performed with no immediate complications.

## 2021-11-12 NOTE — REASON FOR VISIT
[Subsequent Evaluation] : a subsequent evaluation for [Other: _____] : [unfilled] [FreeTextEntry2] :  tongue cancer surveillance

## 2021-12-08 ENCOUNTER — NON-APPOINTMENT (OUTPATIENT)
Age: 86
End: 2021-12-08

## 2022-01-04 ENCOUNTER — APPOINTMENT (OUTPATIENT)
Dept: INTERNAL MEDICINE | Facility: CLINIC | Age: 87
End: 2022-01-04
Payer: MEDICARE

## 2022-01-04 VITALS
DIASTOLIC BLOOD PRESSURE: 79 MMHG | TEMPERATURE: 98 F | OXYGEN SATURATION: 98 % | WEIGHT: 122 LBS | HEIGHT: 67 IN | HEART RATE: 76 BPM | BODY MASS INDEX: 19.15 KG/M2 | SYSTOLIC BLOOD PRESSURE: 155 MMHG

## 2022-01-04 PROCEDURE — G0442 ANNUAL ALCOHOL SCREEN 15 MIN: CPT | Mod: 59

## 2022-01-04 PROCEDURE — G0444 DEPRESSION SCREEN ANNUAL: CPT | Mod: 59

## 2022-01-04 PROCEDURE — 99205 OFFICE O/P NEW HI 60 MIN: CPT

## 2022-01-04 NOTE — PHYSICAL EXAM
[No Acute Distress] : no acute distress [Well Nourished] : well nourished [Normal Sclera/Conjunctiva] : normal sclera/conjunctiva [EOMI] : extraocular movements intact [Normal Outer Ear/Nose] : the outer ears and nose were normal in appearance [No Lymphadenopathy] : no lymphadenopathy [Supple] : supple [No Respiratory Distress] : no respiratory distress  [No Accessory Muscle Use] : no accessory muscle use [Clear to Auscultation] : lungs were clear to auscultation bilaterally [Normal Rate] : normal rate  [Regular Rhythm] : with a regular rhythm [Normal S1, S2] : normal S1 and S2 [No Murmur] : no murmur heard [Pedal Pulses Present] : the pedal pulses are present [No Edema] : there was no peripheral edema [No Extremity Clubbing/Cyanosis] : no extremity clubbing/cyanosis [Soft] : abdomen soft [Non Tender] : non-tender [Non-distended] : non-distended [Normal Bowel Sounds] : normal bowel sounds [Normal Posterior Cervical Nodes] : no posterior cervical lymphadenopathy [Normal Anterior Cervical Nodes] : no anterior cervical lymphadenopathy [No CVA Tenderness] : no CVA  tenderness [No Spinal Tenderness] : no spinal tenderness [No Joint Swelling] : no joint swelling [Grossly Normal Strength/Tone] : grossly normal strength/tone [No Rash] : no rash [Coordination Grossly Intact] : coordination grossly intact [No Focal Deficits] : no focal deficits [Normal Gait] : normal gait [Normal Affect] : the affect was normal [Normal Insight/Judgement] : insight and judgment were intact [de-identified] : s/p left glossectomy

## 2022-01-04 NOTE — HEALTH RISK ASSESSMENT
[Good] : ~his/her~  mood as  good [Never] : Never [No] : No [1 or 2 (0 pts)] : 1 or 2 (0 points) [Never (0 pts)] : Never (0 points) [No falls in past year] : Patient reported no falls in the past year [0] : 2) Feeling down, depressed, or hopeless: Not at all (0) [PHQ-2 Negative - No further assessment needed] : PHQ-2 Negative - No further assessment needed [None] : None [With Family] : lives with family [Retired] : retired [Feels Safe at Home] : Feels safe at home [Independent] : using telephone [Some assistance needed] : managing finances [Full assistance needed] : using transportation [Reports normal functional visual acuity (ie: able to read med bottle)] : Reports normal functional visual acuity [Smoke Detector] : smoke detector [Carbon Monoxide Detector] : carbon monoxide detector [Safety elements used in home] : safety elements used in home [Seat Belt] :  uses seat belt [Sunscreen] : uses sunscreen [Reviewed no changes] : Reviewed, no changes [Audit-CScore] : 0 [[id number]] : 0 [Change in mental status noted] : No change in mental status noted [Language] : denies difficulty with language [Behavior] : denies difficulty with behavior [Learning/Retaining New Information] : denies difficulty learning/retaining new information [Handling Complex Tasks] : denies difficulty handling complex tasks [Reasoning] : denies difficulty with reasoning [Spatial Ability and Orientation] : denies difficulty with spatial ability and orientation [High Risk Behavior] : no high risk behavior [Reports changes in hearing] : Reports no changes in hearing [Reports changes in vision] : Reports no changes in vision [Reports changes in dental health] : Reports no changes in dental health [Guns at Home] : no guns at home [Travel to Developing Areas] : does not  travel to developing areas [TB Exposure] : is not being exposed to tuberculosis [Caregiver Concerns] : does not have caregiver concerns [AdvancecareDate] : 01/22

## 2022-01-04 NOTE — HISTORY OF PRESENT ILLNESS
[de-identified] : Dudley Nath ia an 86 year old male accompanied by his daughter with history of renal cancer s/p right nephrectomy, tongue cancer s/p partial  glossectomy and left parotidectomy removal 5/6/2021 and CKD presents today to establish care with me as his new PCP.\par \par He feels well, offers no complaints \par He underwent partial glossectomy in May 2021. He follows with [facility] Dr. Shin Peace. Currently has soft diet, which he is tolerating well. He follows with Dr. Peace every 3 months and had repeat PET scan 10/18/2021 which showed no new disease.\par Denies CP, SOB, palpations, N/V or abdominal pain

## 2022-01-04 NOTE — ASSESSMENT
[FreeTextEntry1] : New patient to my office\par \par History of tongue cancer s/p glossectomy\par following with [facility] every 3 months\par \par hypothyroid\par cont levothyroxine 50 mcg daily\par will check TSH today\par \par History of renal ca/CKD\par will check CMP today\par \par BPH\par cont Flomax 0.4 mg daily\par \par Depression screen performed today, PHQ2=0, negative.\par \par Annual alcohol misuse screen, 15 mins, done; negative.\par \par \par

## 2022-01-07 LAB
ALBUMIN SERPL ELPH-MCNC: 4.6 G/DL
ALP BLD-CCNC: 62 U/L
ALT SERPL-CCNC: 11 U/L
ANION GAP SERPL CALC-SCNC: 12 MMOL/L
AST SERPL-CCNC: 27 U/L
BILIRUB SERPL-MCNC: 0.4 MG/DL
BUN SERPL-MCNC: 29 MG/DL
CALCIUM SERPL-MCNC: 9.9 MG/DL
CHLORIDE SERPL-SCNC: 102 MMOL/L
CO2 SERPL-SCNC: 25 MMOL/L
CREAT SERPL-MCNC: 1.9 MG/DL
GLUCOSE SERPL-MCNC: 121 MG/DL
POTASSIUM SERPL-SCNC: 6.1 MMOL/L
PROT SERPL-MCNC: 7.2 G/DL
SODIUM SERPL-SCNC: 139 MMOL/L
TSH SERPL-ACNC: 3.38 UIU/ML

## 2022-01-09 ENCOUNTER — NON-APPOINTMENT (OUTPATIENT)
Age: 87
End: 2022-01-09

## 2022-01-10 ENCOUNTER — APPOINTMENT (OUTPATIENT)
Dept: OTOLARYNGOLOGY | Facility: CLINIC | Age: 87
End: 2022-01-10
Payer: MEDICARE

## 2022-01-10 VITALS
TEMPERATURE: 98.1 F | BODY MASS INDEX: 18.52 KG/M2 | SYSTOLIC BLOOD PRESSURE: 142 MMHG | DIASTOLIC BLOOD PRESSURE: 76 MMHG | WEIGHT: 118 LBS | HEART RATE: 60 BPM | HEIGHT: 67 IN

## 2022-01-10 DIAGNOSIS — H90.3 SENSORINEURAL HEARING LOSS, BILATERAL: ICD-10-CM

## 2022-01-10 DIAGNOSIS — H93.13 TINNITUS, BILATERAL: ICD-10-CM

## 2022-01-10 PROCEDURE — 92567 TYMPANOMETRY: CPT

## 2022-01-10 PROCEDURE — 99213 OFFICE O/P EST LOW 20 MIN: CPT | Mod: 25

## 2022-01-10 PROCEDURE — 92557 COMPREHENSIVE HEARING TEST: CPT

## 2022-01-10 NOTE — HISTORY OF PRESENT ILLNESS
[Hearing Loss] : hearing loss [Presbycusis] : presbycusis [None] : No risk factors have been identified. [de-identified] : Patient complains of decreased hearing x several years. States he hears better from the right ear. Always asking people to repeat things. Has intermittent tinnitus that is not bothersome. Pt has no ear pain, ear drainage,  vertigo, nasal congestion, nasal discharge, epistaxis, sinus infections, facial pain, facial pressure, throat pain, dysphagia or fevers\par \par  [Early Onset Hearing Loss] : no early onset hearing loss [Stroke] : no stroke [Allergic Rhinitis] : no allergic rhinitis [Asthma] : no asthma [Adenoidectomy] : no adenoidectomy [Nasal FB Removal] : no nasal foreign body removal [Hyperthyroidism] : no hyperthyroidism [Sialadenitis] : no sialadenitis [Hodgkin Disease] : no hodgkin disease [Non-Hodgkin Lymphoma] : no non-hodgkin lymphoma [Graves Disease] : no graves disease [Thyroid Cancer] : no thyroid cancer

## 2022-01-10 NOTE — ASSESSMENT
[FreeTextEntry1] : Patient complains of decreased hearing x several years, he hears better from the right ear than the left. Also has intermittent tinnitus that is not bothersome. On exam ears are clean without wax or infection. \par \par Bilateral SNHL and tinnitus:\par -cleared for hearing aids\par -Hearing Test performed to evaluate the extent of hearing loss and to explain pt's symptoms\par \par \par f/u prn

## 2022-01-10 NOTE — DATA REVIEWED
[de-identified] : Hearing Test performed to evaluate the extent of hearing loss and  to explain pt's symptoms\par today's hearing test was personally reviewed and revealed\par RT: mod to severe SNHL with very good SRS\par LT: mod to mod-severe SNHL with fair SRS

## 2022-01-10 NOTE — END OF VISIT
[Time Spent: ___ minutes] : I have spent [unfilled] minutes of time on the encounter. [FreeTextEntry3] : I personally saw and examined HERACLIO MENDOZA in detail. I spoke to STEPHANIE Talley regarding the assessment and plan of care. I reviewed the above assessment and plan of care, and agree. I have made changes in changes in the body of the note where appropriate.I personally reviewed the HPI, PMH, FH, SH, ROS and medications/allergies. I have spoken to STEPHANIE Talley regarding the history and have personally determined the assessment and plan of care, and documented this myself. I was present and participated in all key portions of the encounter and all procedures noted above. I have made changes in the body of the note where appropriate.\par \par Attesting Faculty: See Attending Signature Below \par \par \par

## 2022-02-09 ENCOUNTER — APPOINTMENT (OUTPATIENT)
Dept: PHARMACY | Facility: CLINIC | Age: 87
End: 2022-02-09

## 2022-02-18 ENCOUNTER — APPOINTMENT (OUTPATIENT)
Dept: OTOLARYNGOLOGY | Facility: CLINIC | Age: 87
End: 2022-02-18
Payer: MEDICARE

## 2022-02-18 VITALS — BODY MASS INDEX: 18.36 KG/M2 | HEIGHT: 67 IN | WEIGHT: 117 LBS

## 2022-02-18 VITALS — SYSTOLIC BLOOD PRESSURE: 112 MMHG | HEART RATE: 78 BPM | DIASTOLIC BLOOD PRESSURE: 74 MMHG

## 2022-02-18 PROCEDURE — 99213 OFFICE O/P EST LOW 20 MIN: CPT

## 2022-02-18 NOTE — PHYSICAL EXAM
[de-identified] : Status post left parotidectomy and neck dissection [de-identified] : Right parotid right submandibular gland normal, status post left parotidectomy left selective neck dissection [Normal] : mucosa is normal [Impaired mobility] : impaired mobility [Removed] : palatine tonsils previously removed [de-identified] : Left tongue floor mouth some scar tissue from previous resection well-healed no evidence of leukoplakia erythroplakia ulceration or nodularity consistent with a recurrence he appears to have no evidence of disease  at the local site. Stable without changes from last examination, no symptoms.  [de-identified] : No evidence of other obvious abnormality of the mucosal surfaces in the oral cavity

## 2022-02-18 NOTE — HISTORY OF PRESENT ILLNESS
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 Patient reports continued left shoulder pain and descreased range of motion-no longer going to PT but practicing exercise with some relief. Patient reports voice has improved since last visit. Patient is still tolerating soft diet and liquids- weight is stable, reports slight dysphagia when swallowing pills. Daughter reports an episode of elevated blood pressure (169/94), weakness and dizziness for 2 days-resolved with rest. Scheduled for left eye cataract surgery in April. Denies odynophagia, aspirations, dyspnea, dysphonia, otalgia.\par

## 2022-02-18 NOTE — REASON FOR VISIT
[Subsequent Evaluation] : a subsequent evaluation for [Other: _____] : [unfilled] [FreeTextEntry2] : tongue cancer

## 2022-03-05 ENCOUNTER — NON-APPOINTMENT (OUTPATIENT)
Age: 87
End: 2022-03-05

## 2022-03-08 ENCOUNTER — APPOINTMENT (OUTPATIENT)
Dept: INTERNAL MEDICINE | Facility: CLINIC | Age: 87
End: 2022-03-08
Payer: MEDICARE

## 2022-03-08 VITALS
OXYGEN SATURATION: 97 % | SYSTOLIC BLOOD PRESSURE: 137 MMHG | BODY MASS INDEX: 18.83 KG/M2 | WEIGHT: 120 LBS | TEMPERATURE: 98.3 F | DIASTOLIC BLOOD PRESSURE: 70 MMHG | HEIGHT: 67 IN | HEART RATE: 69 BPM

## 2022-03-08 DIAGNOSIS — M25.512 PAIN IN LEFT SHOULDER: ICD-10-CM

## 2022-03-08 DIAGNOSIS — R07.81 PLEURODYNIA: ICD-10-CM

## 2022-03-08 PROCEDURE — 99214 OFFICE O/P EST MOD 30 MIN: CPT

## 2022-03-08 NOTE — ASSESSMENT
[FreeTextEntry1] : \par Check X-rays, concerned about rib fracture and possible shoulder injury as well. Refer to Ortho pending X-ray results. Advised ER for more timely evaluation and work-up but patient and daughter declined.

## 2022-03-08 NOTE — PHYSICAL EXAM
[Normal] : no acute distress, well nourished, well developed and well-appearing [de-identified] : reproducible pain on palpation over L upper ribs near L clavicle as well as limited ROM of L shoulder due to pain

## 2022-03-08 NOTE — HISTORY OF PRESENT ILLNESS
[FreeTextEntry8] : Suffered mechanical fall 2 days ago, tripped on curb outside his home. Called on-call service and advised to go to ED but did not due to fears of COVID exposure and need to care for his wife who lives with him at home. He feels pain over his L upper chest area, near the sternum and clavicle and cannot extend or lift his L shoulder due to pain. He did not hit his head.

## 2022-03-08 NOTE — PHYSICAL EXAM
[Normal] : no acute distress, well nourished, well developed and well-appearing [de-identified] : reproducible pain on palpation over L upper ribs near L clavicle as well as limited ROM of L shoulder due to pain

## 2022-03-09 ENCOUNTER — APPOINTMENT (OUTPATIENT)
Dept: RADIOLOGY | Facility: CLINIC | Age: 87
End: 2022-03-09
Payer: MEDICARE

## 2022-03-09 ENCOUNTER — OUTPATIENT (OUTPATIENT)
Dept: OUTPATIENT SERVICES | Facility: HOSPITAL | Age: 87
LOS: 1 days | End: 2022-03-09
Payer: MEDICARE

## 2022-03-09 ENCOUNTER — APPOINTMENT (OUTPATIENT)
Dept: INTERNAL MEDICINE | Facility: CLINIC | Age: 87
End: 2022-03-09

## 2022-03-09 ENCOUNTER — RESULT REVIEW (OUTPATIENT)
Age: 87
End: 2022-03-09

## 2022-03-09 DIAGNOSIS — Z00.8 ENCOUNTER FOR OTHER GENERAL EXAMINATION: ICD-10-CM

## 2022-03-09 DIAGNOSIS — Z98.890 OTHER SPECIFIED POSTPROCEDURAL STATES: Chronic | ICD-10-CM

## 2022-03-09 DIAGNOSIS — R07.81 PLEURODYNIA: ICD-10-CM

## 2022-03-09 PROCEDURE — 73000 X-RAY EXAM OF COLLAR BONE: CPT

## 2022-03-09 PROCEDURE — 73000 X-RAY EXAM OF COLLAR BONE: CPT | Mod: 26,LT,RT

## 2022-03-09 PROCEDURE — 73030 X-RAY EXAM OF SHOULDER: CPT | Mod: 26,LT

## 2022-03-09 PROCEDURE — 71110 X-RAY EXAM RIBS BIL 3 VIEWS: CPT

## 2022-03-09 PROCEDURE — 71110 X-RAY EXAM RIBS BIL 3 VIEWS: CPT | Mod: 26

## 2022-03-09 PROCEDURE — 73030 X-RAY EXAM OF SHOULDER: CPT

## 2022-03-11 ENCOUNTER — APPOINTMENT (OUTPATIENT)
Dept: INTERNAL MEDICINE | Facility: CLINIC | Age: 87
End: 2022-03-11

## 2022-04-08 ENCOUNTER — TRANSCRIPTION ENCOUNTER (OUTPATIENT)
Age: 87
End: 2022-04-08

## 2022-04-08 ENCOUNTER — NON-APPOINTMENT (OUTPATIENT)
Age: 87
End: 2022-04-08

## 2022-04-08 ENCOUNTER — APPOINTMENT (OUTPATIENT)
Dept: INTERNAL MEDICINE | Facility: CLINIC | Age: 87
End: 2022-04-08
Payer: MEDICARE

## 2022-04-08 PROCEDURE — 99442: CPT

## 2022-04-08 NOTE — REVIEW OF SYSTEMS
[Negative] : Heme/Lymph [FreeTextEntry2] : Symptoms: cough, possible chills, possible body aches. [FreeTextEntry6] : Symptoms: cough, possible chills, possible body aches. [FreeTextEntry9] : Symptoms: cough, possible chills, possible body aches.

## 2022-04-08 NOTE — PLAN
[FreeTextEntry1] : \par \par The pt is going to: [facility] , immediately, due to his symptoms.\par \par \par (Daughter will drive him, immediately).\par \par \par All questions answered.\par \par \par \par

## 2022-04-09 ENCOUNTER — APPOINTMENT (OUTPATIENT)
Dept: DISASTER EMERGENCY | Facility: HOSPITAL | Age: 87
End: 2022-04-09

## 2022-04-09 ENCOUNTER — OUTPATIENT (OUTPATIENT)
Dept: OUTPATIENT SERVICES | Facility: HOSPITAL | Age: 87
LOS: 1 days | End: 2022-04-09
Payer: COMMERCIAL

## 2022-04-09 VITALS
OXYGEN SATURATION: 97 % | RESPIRATION RATE: 17 BRPM | SYSTOLIC BLOOD PRESSURE: 136 MMHG | HEIGHT: 67 IN | TEMPERATURE: 99 F | WEIGHT: 115.08 LBS | HEART RATE: 70 BPM | DIASTOLIC BLOOD PRESSURE: 49 MMHG

## 2022-04-09 VITALS
SYSTOLIC BLOOD PRESSURE: 131 MMHG | RESPIRATION RATE: 17 BRPM | HEART RATE: 60 BPM | DIASTOLIC BLOOD PRESSURE: 71 MMHG | TEMPERATURE: 98 F | OXYGEN SATURATION: 97 %

## 2022-04-09 DIAGNOSIS — Z98.890 OTHER SPECIFIED POSTPROCEDURAL STATES: Chronic | ICD-10-CM

## 2022-04-09 DIAGNOSIS — U07.1 COVID-19: ICD-10-CM

## 2022-04-09 RX ORDER — BEBTELOVIMAB 87.5 MG/ML
175 INJECTION, SOLUTION INTRAVENOUS ONCE
Refills: 0 | Status: COMPLETED | OUTPATIENT
Start: 2022-04-09 | End: 2022-04-09

## 2022-04-09 RX ORDER — SODIUM CHLORIDE 9 MG/ML
250 INJECTION INTRAMUSCULAR; INTRAVENOUS; SUBCUTANEOUS ONCE
Refills: 0 | Status: COMPLETED | OUTPATIENT
Start: 2022-04-09 | End: 2022-04-09

## 2022-04-09 RX ADMIN — BEBTELOVIMAB 175 MILLIGRAM(S): 87.5 INJECTION, SOLUTION INTRAVENOUS at 14:59

## 2022-04-09 RX ADMIN — SODIUM CHLORIDE 250 MILLILITER(S): 9 INJECTION INTRAMUSCULAR; INTRAVENOUS; SUBCUTANEOUS at 15:00

## 2022-04-09 NOTE — MONOCLONAL ANTIBODY INFUSION - EXAM
Exam/findings:  T(C): 37.2 (04-09-22 @ 14:34), Max: 37.2 (04-09-22 @ 14:34)  HR: 70 (04-09-22 @ 14:34) (70 - 70)  BP: 136/49 (04-09-22 @ 14:34) (136/49 - 136/49)  RR: 17 (04-09-22 @ 14:34) (17 - 17)  SpO2: 97% (04-09-22 @ 14:34) (97% - 97%)    PE:   Appearance: NAD	  HEENT:  NC/AT  Cardiovascular:  No edema  Respiratory: no use of accessory muscles  Gastrointestinal:  non-distended   Skin: warm and dry  Neurologic: Non-focal  Extremities: Normal range of motion

## 2022-04-09 NOTE — MONOCLONAL ANTIBODY INFUSION - ASSESSMENT AND PLAN
CC: Monoclonal Antibody Infusion/COVID 19 Positive  86y Male with hx of tongue cancer, kidney cancer, hypothyroidism, kidney disease, COPD, and recent dx of COVID 19+ who presents today for elective Bebtelovimab. Patient has been screened and was deemed to be a candidate.    Symptoms/ Criteria  Date of Symptom Onset: 4/5/22  Symptoms: cough, congestion, lightheadedness, sore throat   Date of Positive COVID PCR: 4/8/22  Risk Profile includes:  immunocompromised, lung disease    Vaccination Status: Pfizer booster 10/2021     PMHx:  Infection due to severe acute respiratory syndrome coronavirus 2 (SARS-CoV-2)    ASSESSMENT:  Pt is COVID positive and symptomatic who was referred for Bebtelovimab monoclonal antibody treatment.    PLAN:  - MAB treatment explained to patient. I have reviewed the Bebtelovimab Emergency Use Authorization (EUA).   - Consent for MAB obtained.   - Risk & benefits discussed. Patient verbalized understanding of plan and agrees to treatment. All questions answered.  - 175mg of Bebtelovimab administered as a single intravenous injection over at least 30 seconds.   - Observe patient for one hour post medication administration and then if stable, discharge home with oupatient follow up as planned by PCP.    POST ASSESSMENT:   Patient completed MAB, and monitored x 1 hour post-infusion with no adverse reactions noted, remained hemodynamically stable.  - Patient tolerated injection well; denies complaints of chest pain/SOB/dizziness/palpitations.   - VSS for discharge home.  - D/C instructions given/ fact sheet included.  - Patient was instructed to self-isolate and use infection control measures (e.g wear mask, isolate, social distance, avoid sharing personal items, clean and disinfect "high touch" surfaces, and frequent handwashing according to the CDC guidelines.   - The patient was informed on what symptoms to be aware of for the next couple of days, and if there are any issues to call the 24/7 clinical call center. Patient was instructed to follow up with primary care provider as needed.    DISCHARGE at 410pm.

## 2022-04-09 NOTE — MONOCLONAL ANTIBODY INFUSION - HOME MEDICATIONS
Allegra 24 Hour Allergy oral tablet , 1 tab(s) orally once a day  Flomax 0.4 mg oral capsule , 1 cap(s) orally once a day  vitamin D , 1000 international unit(s) orally once a day in am  calcium 500mg , 1 tab(s) orally once a day in am  levothyroxine 50 mcg (0.05 mg) oral tablet , 1 tab(s) orally once a day in am

## 2022-04-11 PROCEDURE — M0222: CPT

## 2022-04-13 ENCOUNTER — TRANSCRIPTION ENCOUNTER (OUTPATIENT)
Age: 87
End: 2022-04-13

## 2022-04-14 ENCOUNTER — APPOINTMENT (OUTPATIENT)
Dept: INTERNAL MEDICINE | Facility: CLINIC | Age: 87
End: 2022-04-14

## 2022-06-13 ENCOUNTER — APPOINTMENT (OUTPATIENT)
Dept: INTERNAL MEDICINE | Facility: CLINIC | Age: 87
End: 2022-06-13
Payer: MEDICARE

## 2022-06-13 ENCOUNTER — NON-APPOINTMENT (OUTPATIENT)
Age: 87
End: 2022-06-13

## 2022-06-13 VITALS
WEIGHT: 119 LBS | HEART RATE: 67 BPM | TEMPERATURE: 97.7 F | HEIGHT: 67 IN | SYSTOLIC BLOOD PRESSURE: 139 MMHG | BODY MASS INDEX: 18.68 KG/M2 | OXYGEN SATURATION: 96 % | DIASTOLIC BLOOD PRESSURE: 79 MMHG

## 2022-06-13 PROCEDURE — 93000 ELECTROCARDIOGRAM COMPLETE: CPT

## 2022-06-13 PROCEDURE — 99215 OFFICE O/P EST HI 40 MIN: CPT | Mod: 25

## 2022-06-13 RX ORDER — DICLOFENAC SODIUM 1% 10 MG/G
1 GEL TOPICAL
Qty: 1 | Refills: 2 | Status: DISCONTINUED | COMMUNITY
Start: 2022-03-08 | End: 2022-06-13

## 2022-06-13 RX ORDER — AMOXICILLIN 500 MG/1
500 TABLET, FILM COATED ORAL
Qty: 14 | Refills: 0 | Status: DISCONTINUED | COMMUNITY
Start: 2022-04-15 | End: 2022-06-13

## 2022-06-13 NOTE — HISTORY OF PRESENT ILLNESS
[No Pertinent Cardiac History] : no history of aortic stenosis, atrial fibrillation, coronary artery disease, recent myocardial infarction, or implantable device/pacemaker [Aortic Stenosis] : no aortic stenosis [Atrial Fibrillation] : no atrial fibrillation [Coronary Artery Disease] : no coronary artery disease [Recent Myocardial Infarction] : no recent myocardial infarction [Implantable Device/Pacemaker] : no implantable device/pacemaker [No Pertinent Pulmonary History] : no history of asthma, COPD, sleep apnea, or smoking [Asthma] : no asthma [COPD] : no COPD [Sleep Apnea] : no sleep apnea [Smoker] : not a smoker [No Adverse Anesthesia Reaction] : no adverse anesthesia reaction in self or family member [Self] : no previous adverse anesthesia reaction [Chronic Anticoagulation] : no chronic anticoagulation [Chronic Kidney Disease] : no chronic kidney disease [Diabetes] : no diabetes [(Patient denies any chest pain, claudication, dyspnea on exertion, orthopnea, palpitations or syncope)] : Patient denies any chest pain, claudication, dyspnea on exertion, orthopnea, palpitations or syncope [Good (7-10 METs)] : Good (7-10 METs) [FreeTextEntry1] : left eye cataract surgery [FreeTextEntry2] : 6/23/22 [Family Member] : family member [FreeTextEntry3] : SUSAN

## 2022-06-13 NOTE — ASSESSMENT
[High Risk Surgery - Intraperitoneal, Intrathoracic or Supringuinal Vascular Procedures] : High Risk Surgery - Intraperitoneal, Intrathoracic or Supringuinal Vascular Procedures - No (0) [Ischemic Heart Disease] : Ischemic Heart Disease - No (0) [Congestive Heart Failure] : Congestive Heart Failure - No (0) [Prior Cerebrovascular Accident or TIA] : Prior Cerebrovascular Accident or TIA - No (0) [Creatinine >= 2mg/dL (1 Point)] : Creatinine >= 2mg/dL - No (0) [Insulin-dependent Diabetic (1 Point)] : Insulin-dependent Diabetic - No (0) [0] : 0 , RCRI Class: I, Risk of Post-Op Cardiac Complications: 3.9%, 95% CI for Risk Estimate: 2.8% - 5.4% [Patient Optimized for Surgery] : Patient optimized for surgery [No Further Testing Recommended] : no further testing recommended [FreeTextEntry4] : \par EKG reviewed and stable. There is no acute change. There is no medical contraindication to proceed with the planned surgery at this time.

## 2022-07-11 ENCOUNTER — APPOINTMENT (OUTPATIENT)
Dept: INTERNAL MEDICINE | Facility: CLINIC | Age: 87
End: 2022-07-11

## 2022-07-11 VITALS
TEMPERATURE: 97.7 F | HEART RATE: 63 BPM | SYSTOLIC BLOOD PRESSURE: 109 MMHG | BODY MASS INDEX: 18.99 KG/M2 | HEIGHT: 67 IN | DIASTOLIC BLOOD PRESSURE: 70 MMHG | OXYGEN SATURATION: 96 % | WEIGHT: 121 LBS

## 2022-07-11 DIAGNOSIS — Z00.00 ENCOUNTER FOR GENERAL ADULT MEDICAL EXAMINATION W/OUT ABNORMAL FINDINGS: ICD-10-CM

## 2022-07-11 PROCEDURE — G0439: CPT

## 2022-07-11 NOTE — HISTORY OF PRESENT ILLNESS
[de-identified] : Dudley Nath ia an 86 year old male accompanied by his daughter with history of renal cancer s/p right nephrectomy, tongue cancer s/p partial  glossectomy and left parotidectomy removal 5/6/2021 and CKD presents today for CPE.\par \par He feels well, offers no complaints \par He underwent partial glossectomy in May 2021. He follows with [facility] Dr. Shin Peace. Currently has soft diet, which he is tolerating well. He follows with Dr. Peace every 3 months and had repeat PET scan 10/18/2021 which showed no new disease.\par Denies CP, SOB, palpations, N/V or abdominal pain

## 2022-07-11 NOTE — HEALTH RISK ASSESSMENT
[Good] : ~his/her~  mood as  good [Never] : Never [No] : No [1 or 2 (0 pts)] : 1 or 2 (0 points) [Never (0 pts)] : Never (0 points) [No falls in past year] : Patient reported no falls in the past year [0] : 2) Feeling down, depressed, or hopeless: Not at all (0) [PHQ-2 Negative - No further assessment needed] : PHQ-2 Negative - No further assessment needed [Audit-CScore] : 0 [[id number]] : 0 [Change in mental status noted] : No change in mental status noted [Language] : denies difficulty with language [Behavior] : denies difficulty with behavior [Learning/Retaining New Information] : denies difficulty learning/retaining new information [Handling Complex Tasks] : denies difficulty handling complex tasks [Reasoning] : denies difficulty with reasoning [Spatial Ability and Orientation] : denies difficulty with spatial ability and orientation [None] : None [With Family] : lives with family [Retired] : retired [High Risk Behavior] : no high risk behavior [Feels Safe at Home] : Feels safe at home [Independent] : using telephone [Some assistance needed] : managing finances [Full assistance needed] : using transportation [Reports changes in hearing] : Reports no changes in hearing [Reports changes in vision] : Reports no changes in vision [Reports normal functional visual acuity (ie: able to read med bottle)] : Reports normal functional visual acuity [Reports changes in dental health] : Reports no changes in dental health [Smoke Detector] : smoke detector [Carbon Monoxide Detector] : carbon monoxide detector [Guns at Home] : no guns at home [Safety elements used in home] : safety elements used in home [Seat Belt] :  uses seat belt [Sunscreen] : uses sunscreen [Travel to Developing Areas] : does not  travel to developing areas [TB Exposure] : is not being exposed to tuberculosis [Caregiver Concerns] : does not have caregiver concerns [Reviewed no changes] : Reviewed, no changes [AdvancecareDate] : 01/22

## 2022-07-11 NOTE — PHYSICAL EXAM
[No Acute Distress] : no acute distress [Well Nourished] : well nourished [Normal Sclera/Conjunctiva] : normal sclera/conjunctiva [EOMI] : extraocular movements intact [Normal Outer Ear/Nose] : the outer ears and nose were normal in appearance [No Lymphadenopathy] : no lymphadenopathy [Supple] : supple [No Respiratory Distress] : no respiratory distress  [No Accessory Muscle Use] : no accessory muscle use [Clear to Auscultation] : lungs were clear to auscultation bilaterally [Normal Rate] : normal rate  [Regular Rhythm] : with a regular rhythm [Normal S1, S2] : normal S1 and S2 [No Murmur] : no murmur heard [Pedal Pulses Present] : the pedal pulses are present [No Edema] : there was no peripheral edema [No Extremity Clubbing/Cyanosis] : no extremity clubbing/cyanosis [Soft] : abdomen soft [Non Tender] : non-tender [Non-distended] : non-distended [Normal Bowel Sounds] : normal bowel sounds [Normal Posterior Cervical Nodes] : no posterior cervical lymphadenopathy [Normal Anterior Cervical Nodes] : no anterior cervical lymphadenopathy [No CVA Tenderness] : no CVA  tenderness [No Spinal Tenderness] : no spinal tenderness [No Joint Swelling] : no joint swelling [Grossly Normal Strength/Tone] : grossly normal strength/tone [No Rash] : no rash [Coordination Grossly Intact] : coordination grossly intact [No Focal Deficits] : no focal deficits [Normal Gait] : normal gait [Normal Affect] : the affect was normal [Normal Insight/Judgement] : insight and judgment were intact [de-identified] : s/p left glossectomy

## 2022-07-11 NOTE — ASSESSMENT
[FreeTextEntry1] : New patient to my office\par \par History of tongue cancer s/p glossectomy\par following with [facility] every 3 months\par \par hypothyroid\par cont levothyroxine 50 mcg daily\par will check TSH today\par \par History of renal ca/CKD\par will check CMP today\par \par BPH\par cont Flomax 0.4 mg daily\par \par

## 2022-07-12 ENCOUNTER — NON-APPOINTMENT (OUTPATIENT)
Age: 87
End: 2022-07-12

## 2022-07-12 LAB
ALBUMIN SERPL ELPH-MCNC: 4.7 G/DL
ALP BLD-CCNC: 58 U/L
ALT SERPL-CCNC: 13 U/L
ANION GAP SERPL CALC-SCNC: 13 MMOL/L
AST SERPL-CCNC: 26 U/L
BASOPHILS # BLD AUTO: 0.04 K/UL
BASOPHILS NFR BLD AUTO: 1 %
BILIRUB SERPL-MCNC: 0.5 MG/DL
BUN SERPL-MCNC: 27 MG/DL
CALCIUM SERPL-MCNC: 10 MG/DL
CHLORIDE SERPL-SCNC: 106 MMOL/L
CHOLEST SERPL-MCNC: 158 MG/DL
CO2 SERPL-SCNC: 23 MMOL/L
CREAT SERPL-MCNC: 1.81 MG/DL
EGFR: 36 ML/MIN/1.73M2
EOSINOPHIL # BLD AUTO: 0.08 K/UL
EOSINOPHIL NFR BLD AUTO: 1.9 %
ESTIMATED AVERAGE GLUCOSE: 117 MG/DL
GLUCOSE SERPL-MCNC: 86 MG/DL
HBA1C MFR BLD HPLC: 5.7 %
HCT VFR BLD CALC: 47.9 %
HDLC SERPL-MCNC: 68 MG/DL
HGB BLD-MCNC: 14.8 G/DL
IMM GRANULOCYTES NFR BLD AUTO: 0.2 %
LDLC SERPL CALC-MCNC: 66 MG/DL
LYMPHOCYTES # BLD AUTO: 1.02 K/UL
LYMPHOCYTES NFR BLD AUTO: 24.3 %
MAN DIFF?: NORMAL
MCHC RBC-ENTMCNC: 29.7 PG
MCHC RBC-ENTMCNC: 30.9 GM/DL
MCV RBC AUTO: 96 FL
MONOCYTES # BLD AUTO: 0.42 K/UL
MONOCYTES NFR BLD AUTO: 10 %
NEUTROPHILS # BLD AUTO: 2.62 K/UL
NEUTROPHILS NFR BLD AUTO: 62.6 %
NONHDLC SERPL-MCNC: 90 MG/DL
PLATELET # BLD AUTO: 201 K/UL
POTASSIUM SERPL-SCNC: 6.3 MMOL/L
PROT SERPL-MCNC: 7.4 G/DL
PSA SERPL-MCNC: 3.62 NG/ML
RBC # BLD: 4.99 M/UL
RBC # FLD: 14.1 %
SODIUM SERPL-SCNC: 141 MMOL/L
TRIGL SERPL-MCNC: 120 MG/DL
TSH SERPL-ACNC: 2.89 UIU/ML
WBC # FLD AUTO: 4.19 K/UL

## 2022-07-20 ENCOUNTER — NON-APPOINTMENT (OUTPATIENT)
Age: 87
End: 2022-07-20

## 2022-07-21 ENCOUNTER — NON-APPOINTMENT (OUTPATIENT)
Age: 87
End: 2022-07-21

## 2022-07-21 ENCOUNTER — APPOINTMENT (OUTPATIENT)
Dept: UROLOGY | Facility: CLINIC | Age: 87
End: 2022-07-21

## 2022-07-21 LAB
ANION GAP SERPL CALC-SCNC: 11 MMOL/L
BUN SERPL-MCNC: 24 MG/DL
CALCIUM SERPL-MCNC: 10.1 MG/DL
CHLORIDE SERPL-SCNC: 103 MMOL/L
CO2 SERPL-SCNC: 26 MMOL/L
CREAT SERPL-MCNC: 1.89 MG/DL
EGFR: 35 ML/MIN/1.73M2
GLUCOSE SERPL-MCNC: 92 MG/DL
POTASSIUM SERPL-SCNC: 6.5 MMOL/L
SODIUM SERPL-SCNC: 139 MMOL/L

## 2022-07-21 PROCEDURE — 99214 OFFICE O/P EST MOD 30 MIN: CPT

## 2022-07-21 NOTE — ADDENDUM
[FreeTextEntry1] : Entered by DARRELL ROBLERO , acting as scribe for Dr. Forrest Rodriguez.\par The documentation recorded by the scribe accurately reflects the service I personally performed and the decisions made by me.\par

## 2022-07-21 NOTE — LETTER BODY
[FreeTextEntry1] : Mirna Johnson MD\par [address]\[place] [address]\par [phone] (W)\par \par \par Hiram Webster MD\par [address]\[place] [address]\par [phone]\par [phone]\par \par Dear Dr. Johnson and Dr. Rubinstein,\par \par Reason for Visit: Previous right renal cell carcinoma. BPH. \par \par This is a 83 year-old gentleman with chronic BPH and previous renal cell carcinoma status post right radical nephrectomy 9  years ago.  He reports he continues to take Flomax regularly without any side effects or difficulties. 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  He was recently found to have hyperkalemia.  He request repeat BMP today.  He denies any hematuria or dysuria. The past medical history, family history and social history are unchanged. All other review of systems are negative. Patient denies any changes in medications. Medication list was reconciled. He is planning a cruise trip soon.\par \par On examination, the patient is an elderly appearing gentleman in no acute distress. He is alert and oriented follows commands. He has normal mood and affect. He is normocephalic. Neck is supple. Respirations are unlabored. His abdomen is soft and nontender. Bladder is nonpalpable. No CVA tenderness. Neurologically he is grossly intact. No peripheral edema. Skin without gross abnormality. \par \par His recent PSA was 3.62. His creatinine was 1.8. \par \par Assessment: Previous renal cell carcinoma. BPH, with nocturia.  Tongue cancer.  Elevated potassium.\par \par I counseled the patient. He is doing well 9 years out of surgery. His creatinine is elevated, but stable. His previous ultrasound and chest x-ray in 2017 revealed no recurrence of malignancy. I advise him to obtain renal ultrasound today to ensure stability. In terms of his BPH, I renewed his prescription for Flomax today. I encouraged him to continue Flomax as needed.  In terms of his elevated potassium level, he will repeat BMP today.  The patient will follow up as directed and will contact me with any questions or concerns. Thank you for the opportunity to participate in the care of Mr. MENDOZA. I will keep you updated on his progress. \par \par Plan: Continue Flomax.  Repeat BMP.  Follow-up 1 year.

## 2022-07-21 NOTE — HISTORY OF PRESENT ILLNESS
[FreeTextEntry1] : Please refer to URO Consult note\par \par Follow-up BPH.  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  Doing well.  He has elevated potassium.  Repeat BMP.  Follow-up 1 year.

## 2022-07-22 ENCOUNTER — EMERGENCY (EMERGENCY)
Facility: HOSPITAL | Age: 87
LOS: 1 days | Discharge: HOME | End: 2022-07-22
Attending: EMERGENCY MEDICINE
Payer: MEDICARE

## 2022-07-22 VITALS
HEART RATE: 79 BPM | SYSTOLIC BLOOD PRESSURE: 140 MMHG | OXYGEN SATURATION: 99 % | RESPIRATION RATE: 15 BRPM | TEMPERATURE: 98 F | DIASTOLIC BLOOD PRESSURE: 85 MMHG

## 2022-07-22 VITALS
DIASTOLIC BLOOD PRESSURE: 79 MMHG | RESPIRATION RATE: 18 BRPM | WEIGHT: 121.03 LBS | HEART RATE: 74 BPM | SYSTOLIC BLOOD PRESSURE: 149 MMHG | HEIGHT: 67 IN | OXYGEN SATURATION: 97 % | TEMPERATURE: 98 F

## 2022-07-22 DIAGNOSIS — Z98.890 OTHER SPECIFIED POSTPROCEDURAL STATES: Chronic | ICD-10-CM

## 2022-07-22 LAB
ALBUMIN SERPL ELPH-MCNC: 4.2 G/DL — SIGNIFICANT CHANGE UP (ref 3.3–5)
ALP SERPL-CCNC: 54 U/L — SIGNIFICANT CHANGE UP (ref 40–120)
ALT FLD-CCNC: 14 U/L — SIGNIFICANT CHANGE UP (ref 10–45)
ANION GAP SERPL CALC-SCNC: 12 MMOL/L — SIGNIFICANT CHANGE UP (ref 5–17)
APTT BLD: 30 SEC — SIGNIFICANT CHANGE UP (ref 27.5–35.5)
AST SERPL-CCNC: 38 U/L — SIGNIFICANT CHANGE UP (ref 10–40)
BASE EXCESS BLDV CALC-SCNC: -1.3 MMOL/L — SIGNIFICANT CHANGE UP (ref -2–2)
BASOPHILS # BLD AUTO: 0.03 K/UL — SIGNIFICANT CHANGE UP (ref 0–0.2)
BASOPHILS NFR BLD AUTO: 0.7 % — SIGNIFICANT CHANGE UP (ref 0–2)
BILIRUB SERPL-MCNC: 0.5 MG/DL — SIGNIFICANT CHANGE UP (ref 0.2–1.2)
BUN SERPL-MCNC: 25 MG/DL — HIGH (ref 7–23)
CA-I SERPL-SCNC: 1.15 MMOL/L — SIGNIFICANT CHANGE UP (ref 1.15–1.33)
CALCIUM SERPL-MCNC: 9 MG/DL — SIGNIFICANT CHANGE UP (ref 8.4–10.5)
CHLORIDE BLDV-SCNC: 102 MMOL/L — SIGNIFICANT CHANGE UP (ref 96–108)
CHLORIDE SERPL-SCNC: 101 MMOL/L — SIGNIFICANT CHANGE UP (ref 96–108)
CO2 BLDV-SCNC: 26 MMOL/L — SIGNIFICANT CHANGE UP (ref 22–26)
CO2 SERPL-SCNC: 21 MMOL/L — LOW (ref 22–31)
CREAT SERPL-MCNC: 1.76 MG/DL — HIGH (ref 0.5–1.3)
EGFR: 38 ML/MIN/1.73M2 — LOW
EOSINOPHIL # BLD AUTO: 0.06 K/UL — SIGNIFICANT CHANGE UP (ref 0–0.5)
EOSINOPHIL NFR BLD AUTO: 1.4 % — SIGNIFICANT CHANGE UP (ref 0–6)
GAS PNL BLDV: 131 MMOL/L — LOW (ref 136–145)
GAS PNL BLDV: SIGNIFICANT CHANGE UP
GLUCOSE BLDV-MCNC: 121 MG/DL — HIGH (ref 70–99)
GLUCOSE SERPL-MCNC: 131 MG/DL — HIGH (ref 70–99)
HCO3 BLDV-SCNC: 24 MMOL/L — SIGNIFICANT CHANGE UP (ref 22–29)
HCT VFR BLD CALC: 42.8 % — SIGNIFICANT CHANGE UP (ref 39–50)
HCT VFR BLDA CALC: 42 % — SIGNIFICANT CHANGE UP (ref 39–51)
HGB BLD CALC-MCNC: 14.1 G/DL — SIGNIFICANT CHANGE UP (ref 12.6–17.4)
HGB BLD-MCNC: 14 G/DL — SIGNIFICANT CHANGE UP (ref 13–17)
IMM GRANULOCYTES NFR BLD AUTO: 0.5 % — SIGNIFICANT CHANGE UP (ref 0–1.5)
INR BLD: 1.02 RATIO — SIGNIFICANT CHANGE UP (ref 0.88–1.16)
LACTATE BLDV-MCNC: 2 MMOL/L — SIGNIFICANT CHANGE UP (ref 0.7–2)
LYMPHOCYTES # BLD AUTO: 0.53 K/UL — LOW (ref 1–3.3)
LYMPHOCYTES # BLD AUTO: 12.4 % — LOW (ref 13–44)
MCHC RBC-ENTMCNC: 29.9 PG — SIGNIFICANT CHANGE UP (ref 27–34)
MCHC RBC-ENTMCNC: 32.7 GM/DL — SIGNIFICANT CHANGE UP (ref 32–36)
MCV RBC AUTO: 91.3 FL — SIGNIFICANT CHANGE UP (ref 80–100)
MONOCYTES # BLD AUTO: 0.45 K/UL — SIGNIFICANT CHANGE UP (ref 0–0.9)
MONOCYTES NFR BLD AUTO: 10.5 % — SIGNIFICANT CHANGE UP (ref 2–14)
NEUTROPHILS # BLD AUTO: 3.18 K/UL — SIGNIFICANT CHANGE UP (ref 1.8–7.4)
NEUTROPHILS NFR BLD AUTO: 74.5 % — SIGNIFICANT CHANGE UP (ref 43–77)
NRBC # BLD: 0 /100 WBCS — SIGNIFICANT CHANGE UP (ref 0–0)
PCO2 BLDV: 43 MMHG — SIGNIFICANT CHANGE UP (ref 42–55)
PH BLDV: 7.36 — SIGNIFICANT CHANGE UP (ref 7.32–7.43)
PLATELET # BLD AUTO: 184 K/UL — SIGNIFICANT CHANGE UP (ref 150–400)
PO2 BLDV: 47 MMHG — HIGH (ref 25–45)
POTASSIUM BLDV-SCNC: 4.7 MMOL/L — SIGNIFICANT CHANGE UP (ref 3.5–5.1)
POTASSIUM SERPL-MCNC: 4.9 MMOL/L — SIGNIFICANT CHANGE UP (ref 3.5–5.3)
POTASSIUM SERPL-SCNC: 4.9 MMOL/L — SIGNIFICANT CHANGE UP (ref 3.5–5.3)
PROT SERPL-MCNC: 7.1 G/DL — SIGNIFICANT CHANGE UP (ref 6–8.3)
PROTHROM AB SERPL-ACNC: 11.7 SEC — SIGNIFICANT CHANGE UP (ref 10.5–13.4)
RBC # BLD: 4.69 M/UL — SIGNIFICANT CHANGE UP (ref 4.2–5.8)
RBC # FLD: 13.4 % — SIGNIFICANT CHANGE UP (ref 10.3–14.5)
SAO2 % BLDV: 75.5 % — SIGNIFICANT CHANGE UP (ref 67–88)
SODIUM SERPL-SCNC: 134 MMOL/L — LOW (ref 135–145)
WBC # BLD: 4.27 K/UL — SIGNIFICANT CHANGE UP (ref 3.8–10.5)
WBC # FLD AUTO: 4.27 K/UL — SIGNIFICANT CHANGE UP (ref 3.8–10.5)

## 2022-07-22 PROCEDURE — 85730 THROMBOPLASTIN TIME PARTIAL: CPT

## 2022-07-22 PROCEDURE — 84132 ASSAY OF SERUM POTASSIUM: CPT

## 2022-07-22 PROCEDURE — 71045 X-RAY EXAM CHEST 1 VIEW: CPT | Mod: 26

## 2022-07-22 PROCEDURE — 71045 X-RAY EXAM CHEST 1 VIEW: CPT

## 2022-07-22 PROCEDURE — 85025 COMPLETE CBC W/AUTO DIFF WBC: CPT

## 2022-07-22 PROCEDURE — 80053 COMPREHEN METABOLIC PANEL: CPT

## 2022-07-22 PROCEDURE — 83605 ASSAY OF LACTIC ACID: CPT

## 2022-07-22 PROCEDURE — 99285 EMERGENCY DEPT VISIT HI MDM: CPT | Mod: 25

## 2022-07-22 PROCEDURE — 85014 HEMATOCRIT: CPT

## 2022-07-22 PROCEDURE — 85610 PROTHROMBIN TIME: CPT

## 2022-07-22 PROCEDURE — 84295 ASSAY OF SERUM SODIUM: CPT

## 2022-07-22 PROCEDURE — 82435 ASSAY OF BLOOD CHLORIDE: CPT

## 2022-07-22 PROCEDURE — 93010 ELECTROCARDIOGRAM REPORT: CPT

## 2022-07-22 PROCEDURE — 82803 BLOOD GASES ANY COMBINATION: CPT

## 2022-07-22 PROCEDURE — 82330 ASSAY OF CALCIUM: CPT

## 2022-07-22 PROCEDURE — 82947 ASSAY GLUCOSE BLOOD QUANT: CPT

## 2022-07-22 PROCEDURE — 36415 COLL VENOUS BLD VENIPUNCTURE: CPT

## 2022-07-22 PROCEDURE — 96365 THER/PROPH/DIAG IV INF INIT: CPT

## 2022-07-22 PROCEDURE — 85018 HEMOGLOBIN: CPT

## 2022-07-22 PROCEDURE — 99284 EMERGENCY DEPT VISIT MOD MDM: CPT

## 2022-07-22 PROCEDURE — 93005 ELECTROCARDIOGRAM TRACING: CPT

## 2022-07-22 RX ORDER — CALCIUM GLUCONATE 100 MG/ML
2 VIAL (ML) INTRAVENOUS ONCE
Refills: 0 | Status: COMPLETED | OUTPATIENT
Start: 2022-07-22 | End: 2022-07-22

## 2022-07-22 RX ADMIN — Medication 2 GRAM(S): at 12:25

## 2022-07-22 RX ADMIN — Medication 200 GRAM(S): at 11:55

## 2022-07-22 NOTE — ED PROVIDER NOTE - OBJECTIVE STATEMENT
85 yo M with PMH of tongue cancer, COPD, BPH presenting after outpatient labs. 87 yo M with PMH of tongue cancer, COPD, BPH presenting after outpatient labs. Pt had labs 1 week ago showing hyperkalemia. He was started on sodium polystyrene and has been taking it for a week.  He had repeat labs yesterday still showing elevated potassium. Pt denies palpitations, chest pain, dec urination or trouble urinating. Pt only has 1 kidney with known GFR of around 36 with Cr 1.8.

## 2022-07-22 NOTE — ED PROVIDER NOTE - PATIENT PORTAL LINK FT
You can access the FollowMyHealth Patient Portal offered by [facility] by registering at the following website: [email or web address]. By joining [facility]’s FollowMyHealth portal, you will also be able to view your health information using other applications (apps) compatible with our system.

## 2022-07-22 NOTE — ED PROVIDER NOTE - ATTENDING CONTRIBUTION TO CARE
Attending MD Gore:  I personally have seen and examined this patient. I have performed a substantive portion of the visit including all aspects of the medical decision making.  Resident note reviewed and agree on plan of care and except where noted.        86M with ho CKD, tongue CA, COPD presenting for evaluation of outpatient labs concerning for hyperkalemia (6.5). Patient denying specific symptoms related to this. ECG on arrival without peaked T waves, however AR is prolonged (only prior for comparison is normal AR 2017). Will treat presumptively with IV calcium for now then follow up repeat K here to determine if shifting/removal agents required         *The above represents an initial assessment/impression. Please refer to progress notes for potential changes in patient clinical course*

## 2022-07-22 NOTE — ED PROVIDER NOTE - NSPTACCESSSVCSAPPTDETAILS_ED_ALL_ED_FT
Pt has one kidney with CKD. Has had issues with hyperkalemia, needs to see a nephrologist. Pt has one kidney with CKD. Has had issues with hyperkalemia, needs to see a nephrologist.    [phone]

## 2022-07-22 NOTE — ED ADULT NURSE NOTE - OBJECTIVE STATEMENT
patient came in alert & oriented x4. Accompanied by daughter. Called back by PMd yesterday with a high potassium level 6.3. Patient also complaining of weakness to bilateral LE, denies pain. Denies chest pain, sob or fever.

## 2022-07-22 NOTE — ED PROVIDER NOTE - NSFOLLOWUPINSTRUCTIONS_ED_ALL_ED_FT
Please follow up with your primary care physician within 2-3 days.   Return to the ER for any new or concerning symptoms.     You need to follow up with a doctor next week to repeat your lab work to check your potassium.     A referral was placed for you to see a nephrologist. Please see one as soon as possible.    Your potassium was normal today. However, it may be elevated in the future. If you develop and palpitations, or new symptoms, return to the emergency department.

## 2022-07-22 NOTE — ED PROVIDER NOTE - WR INTERPRETATION DATE TIME  1
Discharge instructions via  \"Ba\". Education completed no questions at this time. Patient discharged to home in stable condition with mom and dad. Bands verified with mom and dad.   22-Jul-2022 13:09

## 2022-07-22 NOTE — ED ADULT NURSE NOTE - NSIMPLEMENTINTERV_GEN_ALL_ED
Implemented All Universal Safety Interventions:  [place] to call system. Call bell, personal items and telephone within reach. Instruct patient to call for assistance. Room bathroom lighting operational. Non-slip footwear when patient is off stretcher. Physically safe environment: no spills, clutter or unnecessary equipment. Stretcher in lowest position, wheels locked, appropriate side rails in place.

## 2022-07-22 NOTE — ED PROVIDER NOTE - PHYSICAL EXAMINATION
General: WN/WD NAD  Head: Atraumatic, normocephalic  Eyes: EOM grossly in tact, no scleral icterus  Neurology: A&Ox 3 , nonfocal, PRASAD x 4  Respiratory: normal respiratory effort  CV: Extremities warm and well perfused  Abdominal: Soft, non-distended, non-tender  Extremities: No edema  Skin: No rashes

## 2022-07-22 NOTE — ED ADULT NURSE NOTE - CAS EDN DISCHARGE ASSESSMENT
Telephone Encounter by Kolb-Phalen, Laurie, RN at 03/21/18 02:58 PM     Author:  Kolb-Phalen, Laurie, RN Service:  (none) Author Type:  Registered Nurse     Filed:  03/21/18 02:58 PM Encounter Date:  3/21/2018 Status:  Signed     :  Kolb-Phalen, Laurie, RN (Registered Nurse)       From: Diane Fowler  To: Lyssa Edwards DO  Sent: 3/21/2018  7:41 AM CDT  Subject: Medication Renewal Request    Original authorizing provider: DO Diane Luna would like a refill of the following medications:  JACKIE-28 0.15-30 MG-MCG per tablet [Lyssa Edwards DO]    [facility]erred [facility]    Comment:  Please refill. I will schedule my annual for May or June. Thank you!       Revision History        Date/Time User Provider Type Action    > 03/21/18 02:58 PM Kolb-Phalen, Laurie, RN Registered Nurse Sign    Attribution information within the note text is not available.             Alert and oriented to person, place and time

## 2022-07-22 NOTE — ED PROVIDER NOTE - CLINICAL SUMMARY MEDICAL DECISION MAKING FREE TEXT BOX
85 yo M with PMH of tongue cancer, COPD, BPH presenting after outpatient labs. Differential diagnosis includes but is not limited to RADHA, CKD, medication side effect. would get labs, give calcium, get ecg. Pt does not follow with a nephrologist, he likely needs to see one.

## 2022-07-27 LAB
ALBUMIN SERPL ELPH-MCNC: 4.1 G/DL
ALP BLD-CCNC: 55 U/L
ALT SERPL-CCNC: 10 U/L
ANION GAP SERPL CALC-SCNC: 10 MMOL/L
AST SERPL-CCNC: 23 U/L
BILIRUB SERPL-MCNC: 0.5 MG/DL
BUN SERPL-MCNC: 26 MG/DL
CALCIUM SERPL-MCNC: 9.3 MG/DL
CHLORIDE SERPL-SCNC: 99 MMOL/L
CO2 SERPL-SCNC: 23 MMOL/L
CREAT SERPL-MCNC: 1.77 MG/DL
EGFR: 37 ML/MIN/1.73M2
GLUCOSE SERPL-MCNC: 111 MG/DL
POTASSIUM SERPL-SCNC: 4.8 MMOL/L
PROT SERPL-MCNC: 6.6 G/DL
SODIUM SERPL-SCNC: 133 MMOL/L

## 2022-08-03 ENCOUNTER — APPOINTMENT (OUTPATIENT)
Dept: INTERNAL MEDICINE | Facility: CLINIC | Age: 87
End: 2022-08-03
Payer: MEDICARE

## 2022-08-03 VITALS
HEIGHT: 67 IN | BODY MASS INDEX: 18.05 KG/M2 | TEMPERATURE: 96.5 F | HEART RATE: 59 BPM | OXYGEN SATURATION: 99 % | WEIGHT: 115 LBS | DIASTOLIC BLOOD PRESSURE: 83 MMHG | SYSTOLIC BLOOD PRESSURE: 143 MMHG

## 2022-08-03 PROCEDURE — 99496 TRANSJ CARE MGMT HIGH F2F 7D: CPT

## 2022-08-03 NOTE — HISTORY OF PRESENT ILLNESS
[Post-hospitalization from ___ Hospital] : Post-hospitalization from [unfilled] Hospital [Admitted on: ___] : The patient was admitted on [unfilled] [Discharged on ___] : discharged on [unfilled] [Discharge Summary] : discharge summary [Pertinent Labs] : pertinent labs [Radiology Findings] : radiology findings [Discharge Med List] : discharge medication list [Med Reconciliation] : medication reconciliation has been completed [Patient Contacted By: ____] : and contacted by [unfilled] [FreeTextEntry2] : Sent to ED with hyperkalemia on outpatient labs; stabilized with calcium gluconate; no symptoms today

## 2022-08-03 NOTE — HEALTH RISK ASSESSMENT
[Never] : Never [No] : No [1 or 2 (0 pts)] : 1 or 2 (0 points) [Never (0 pts)] : Never (0 points) [No falls in past year] : Patient reported no falls in the past year [Audit-CScore] : 0 [Change in mental status noted] : No change in mental status noted [Language] : denies difficulty with language [Behavior] : denies difficulty with behavior [Learning/Retaining New Information] : denies difficulty learning/retaining new information [Handling Complex Tasks] : denies difficulty handling complex tasks [Reasoning] : denies difficulty with reasoning [Spatial Ability and Orientation] : denies difficulty with spatial ability and orientation [None] : None [With Family] : lives with family [Fully functional (bathing, dressing, toileting, transferring, walking, feeding)] : Fully functional (bathing, dressing, toileting, transferring, walking, feeding) [Fully functional (using the telephone, shopping, preparing meals, housekeeping, doing laundry, using] : Fully functional and needs no help or supervision to perform IADLs (using the telephone, shopping, preparing meals, housekeeping, doing laundry, using transportation, managing medications and managing finances) [Reports changes in hearing] : Reports no changes in hearing [Reports changes in vision] : Reports no changes in vision [Reports normal functional visual acuity (ie: able to read med bottle)] : Reports normal functional visual acuity [Reports changes in dental health] : Reports no changes in dental health [Smoke Detector] : smoke detector [Carbon Monoxide Detector] : carbon monoxide detector [Guns at Home] : no guns at home [Safety elements used in home] : safety elements used in home [Seat Belt] :  uses seat belt [Sunscreen] : uses sunscreen [Travel to Developing Areas] : does not  travel to developing areas [TB Exposure] : is not being exposed to tuberculosis [Caregiver Concerns] : does not have caregiver concerns [Reviewed no changes] : Reviewed, no changes [AdvancecareDate] : 08/22

## 2022-08-03 NOTE — ASSESSMENT
[FreeTextEntry1] : \par Hyperkalemia: repeat level today, had stabilized after treatment with calcium gluconate

## 2022-08-09 LAB — POTASSIUM SERPL-SCNC: 5.4 MMOL/L

## 2022-08-11 ENCOUNTER — APPOINTMENT (OUTPATIENT)
Dept: NEPHROLOGY | Facility: CLINIC | Age: 87
End: 2022-08-11

## 2022-08-11 ENCOUNTER — NON-APPOINTMENT (OUTPATIENT)
Age: 87
End: 2022-08-11

## 2022-08-11 VITALS
SYSTOLIC BLOOD PRESSURE: 128 MMHG | BODY MASS INDEX: 18.05 KG/M2 | WEIGHT: 115 LBS | HEART RATE: 64 BPM | DIASTOLIC BLOOD PRESSURE: 80 MMHG | HEIGHT: 67 IN

## 2022-08-11 DIAGNOSIS — N18.30 CHRONIC KIDNEY DISEASE, STAGE 3 UNSPECIFIED: ICD-10-CM

## 2022-08-11 PROCEDURE — 99205 OFFICE O/P NEW HI 60 MIN: CPT

## 2022-08-14 NOTE — CONSULT LETTER
[Dear  ___] : Dear  [unfilled], [Consult Letter:] : I had the pleasure of evaluating your patient, [unfilled]. [( Thank you for referring [unfilled] for consultation for _____ )] : Thank you for referring [unfilled] for consultation for [unfilled] [Please see my note below.] : Please see my note below. [Consult Closing:] : Thank you very much for allowing me to participate in the care of this patient.  If you have any questions, please do not hesitate to contact me. [Sincerely,] : Sincerely, [FreeTextEntry3] : Shala Friedman MD\par  [facility]\par Division of Nephrology and Hypertension\par \par

## 2022-08-14 NOTE — REASON FOR VISIT
[Consultation] : a consultation visit [Family Member] : family member [FreeTextEntry1] : hyperkalemia and CKD3B

## 2022-08-14 NOTE — ASSESSMENT
[FreeTextEntry1] : 85 yo male with renal cell s/p nephrectomy, oral cancer CKD3B and hyperkalemia. \par CKD3B relatively stable and will monitor. \par Hyperkalemia from CKD and higher K intake bc of his vegetarian diet. Reviewed some of the foods high in K. \par Info given to pt\par Advised use of lokelma every other day to help with liberalization of his diet and to maintain normokalemia. \par Also understand the importance of nutrition and wt maintenance. Recommend nepro or liquicel for protein intake in CKD. \par Maintain adequate hydration as well. \par Will have him obtain a renal and bladder sono to evaluate for any retention as a possible cause of hyperkalemia. \par Labs today and urine K.\par All questions were answered \par Followup in 3 months\par Daughter present for exam and understands plan as well.

## 2022-08-14 NOTE — REVIEW OF SYSTEMS
[Negative] : Heme/Lymph [As Noted in HPI] : as noted in HPI [FreeTextEntry4] : difficulty chewing and swallowing

## 2022-08-14 NOTE — HISTORY OF PRESENT ILLNESS
[FreeTextEntry1] : 86-year-old male here to establish renal care for CKD stage IIIb and hyperkalemia.\par Patient has a history of renal cell cancer status post nephrectomy.  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  His creatinine ranges from 1.7-2.2 range.\par He has BPH on Flomax.\par He has had intermittent periods of hyperkalemia however more recently his potassium has been more significantly and persistently elevated.  He did stop eating meat and has become primarily vegetarian in his diet.  He has been eating lots of Nuts, beans, tofu, yogurt.  He is also limited to eating softer foods because of his oral cancer surgery.\par Patient was given Kayexalate by PMD in the past.  Had to go to the emergency room at which time the potassium was normal.\par Denies chronic NSAID use.\par He is accompanied by his daughter Polina.

## 2022-08-14 NOTE — PHYSICAL EXAM
[General Appearance - Alert] : alert [General Appearance - In No Acute Distress] : in no acute distress [Sclera] : the sclera and conjunctiva were normal [Outer Ear] : the ears and nose were normal in appearance [Neck Appearance] : the appearance of the neck was normal [Auscultation Breath Sounds / Voice Sounds] : lungs were clear to auscultation bilaterally [Heart Rate And Rhythm] : heart rate was normal and rhythm regular [Heart Sounds] : normal S1 and S2 [Murmurs] : no murmurs [Heart Sounds Pericardial Friction Rub] : no pericardial rub [Edema] : there was no peripheral edema [Bowel Sounds] : normal bowel sounds [Abdomen Soft] : soft [Abdomen Tenderness] : non-tender [No CVA Tenderness] : no ~M costovertebral angle tenderness [Involuntary Movements] : no involuntary movements were seen [] : no rash [No Focal Deficits] : no focal deficits [Oriented To Time, Place, And Person] : oriented to person, place, and time [Affect] : the affect was normal [Mood] : the mood was normal [FreeTextEntry1] : thin male

## 2022-08-15 LAB
ALBUMIN SERPL ELPH-MCNC: 4.7 G/DL
ANION GAP SERPL CALC-SCNC: 11 MMOL/L
APPEARANCE: CLEAR
BACTERIA: NEGATIVE
BILIRUBIN URINE: NEGATIVE
BLOOD URINE: NEGATIVE
BUN SERPL-MCNC: 28 MG/DL
CALCIUM SERPL-MCNC: 9.5 MG/DL
CALCIUM SERPL-MCNC: 9.5 MG/DL
CHLORIDE SERPL-SCNC: 100 MMOL/L
CO2 SERPL-SCNC: 26 MMOL/L
COLOR: NORMAL
CREAT SERPL-MCNC: 2.31 MG/DL
CREAT SPEC-SCNC: 133 MG/DL
EGFR: 27 ML/MIN/1.73M2
GLUCOSE QUALITATIVE U: NEGATIVE
GLUCOSE SERPL-MCNC: 117 MG/DL
HYALINE CASTS: 0 /LPF
KETONES URINE: NEGATIVE
LEUKOCYTE ESTERASE URINE: NEGATIVE
MAGNESIUM SERPL-MCNC: 2.3 MG/DL
MICROALBUMIN 24H UR DL<=1MG/L-MCNC: <1.2 MG/DL
MICROALBUMIN/CREAT 24H UR-RTO: NORMAL MG/G
MICROSCOPIC-UA: NORMAL
NITRITE URINE: NEGATIVE
OSMOLALITY SERPL: 290 MOSMOL/KG
OSMOLALITY UR: 444 MOSM/KG
PARATHYROID HORMONE INTACT: 28 PG/ML
PH URINE: 6
PHOSPHATE SERPL-MCNC: 3.9 MG/DL
POTASSIUM SERPL-SCNC: 5.6 MMOL/L
POTASSIUM UR-SCNC: 64 MMOL/L
PROTEIN URINE: NORMAL
RED BLOOD CELLS URINE: 1 /HPF
SODIUM SERPL-SCNC: 137 MMOL/L
SPECIFIC GRAVITY URINE: 1.02
SQUAMOUS EPITHELIAL CELLS: 0 /HPF
URATE SERPL-MCNC: 7.1 MG/DL
UROBILINOGEN URINE: NORMAL
WHITE BLOOD CELLS URINE: 2 /HPF

## 2022-08-16 ENCOUNTER — APPOINTMENT (OUTPATIENT)
Dept: OTOLARYNGOLOGY | Facility: CLINIC | Age: 87
End: 2022-08-16

## 2022-08-16 VITALS
SYSTOLIC BLOOD PRESSURE: 123 MMHG | WEIGHT: 115 LBS | HEART RATE: 79 BPM | DIASTOLIC BLOOD PRESSURE: 76 MMHG | BODY MASS INDEX: 18.05 KG/M2 | HEIGHT: 67 IN

## 2022-08-16 PROCEDURE — 99213 OFFICE O/P EST LOW 20 MIN: CPT

## 2022-08-16 RX ORDER — BROMFENAC 1.03 MG/ML
0.09 SOLUTION/ DROPS OPHTHALMIC
Qty: 2 | Refills: 0 | Status: COMPLETED | COMMUNITY
Start: 2022-06-20 | End: 2022-08-16

## 2022-08-16 RX ORDER — OFLOXACIN 3 MG/ML
0.3 SOLUTION/ DROPS OPHTHALMIC
Qty: 10 | Refills: 0 | Status: COMPLETED | COMMUNITY
Start: 2022-06-17 | End: 2022-08-16

## 2022-08-16 RX ORDER — SODIUM POLYSTYRENE SULFONATE 15 G/60ML
15 SUSPENSION ORAL; RECTAL
Qty: 2 | Refills: 0 | Status: COMPLETED | COMMUNITY
Start: 2022-07-12 | End: 2022-08-16

## 2022-08-16 RX ORDER — PREDNISOLONE ACETATE 10 MG/ML
1 SUSPENSION/ DROPS OPHTHALMIC
Qty: 15 | Refills: 0 | Status: COMPLETED | COMMUNITY
Start: 2022-06-17 | End: 2022-08-16

## 2022-08-16 NOTE — HISTORY OF PRESENT ILLNESS
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  Denies pain, bleeding or swelling in the mouth.  Denies fevers.  Stable weight.\par Patient reports continued left shoulder decreased range of motion, states pain has resolved.  \par \par c , P T1 N0 stage 1

## 2022-08-16 NOTE — CONSULT LETTER
[Dear  ___] : Dear  [unfilled], [Courtesy Letter:] : I had the pleasure of seeing your patient, [unfilled], in my office today. [Please see my note below.] : Please see my note below. [Sincerely,] : Sincerely, [FreeTextEntry2] : Hamilton Ogden MD ([place], [place]) [FreeTextEntry3] : Bhupendra Buckner MD, FACS\par \par [facility]\par Associate Chair\par Department of Otolaryngology\par Professor\par Otolaryngology & Molecular Medicine\par [facility] [employer]\par

## 2022-08-18 ENCOUNTER — APPOINTMENT (OUTPATIENT)
Dept: ULTRASOUND IMAGING | Facility: IMAGING CENTER | Age: 87
End: 2022-08-18

## 2022-08-18 ENCOUNTER — OUTPATIENT (OUTPATIENT)
Dept: OUTPATIENT SERVICES | Facility: HOSPITAL | Age: 87
LOS: 1 days | End: 2022-08-18
Payer: MEDICARE

## 2022-08-18 DIAGNOSIS — N18.32 CHRONIC KIDNEY DISEASE, STAGE 3B: ICD-10-CM

## 2022-08-18 DIAGNOSIS — Z98.890 OTHER SPECIFIED POSTPROCEDURAL STATES: Chronic | ICD-10-CM

## 2022-08-18 DIAGNOSIS — Z00.8 ENCOUNTER FOR OTHER GENERAL EXAMINATION: ICD-10-CM

## 2022-08-18 PROCEDURE — 76770 US EXAM ABDO BACK WALL COMP: CPT | Mod: 26

## 2022-08-18 PROCEDURE — 76770 US EXAM ABDO BACK WALL COMP: CPT

## 2022-09-24 DIAGNOSIS — Z85.818 PERSONAL HISTORY OF MALIGNANT NEOPLASM OF OTHER SITES OF LIP, ORAL CAVITY, AND PHARYNX: ICD-10-CM

## 2022-09-24 DIAGNOSIS — N18.32 CHRONIC KIDNEY DISEASE, STAGE 3B: ICD-10-CM

## 2022-10-07 ENCOUNTER — RX RENEWAL (OUTPATIENT)
Age: 87
End: 2022-10-07

## 2022-10-11 NOTE — PATIENT PROFILE ADULT. - PURPOSEFUL PROACTIVE ROUNDING
Patient Cheek Interpolation Flap Text: A decision was made to reconstruct the defect utilizing an interpolation axial flap and a staged reconstruction.  A telfa template was made of the defect.  This telfa template was then used to outline the Cheek Interpolation flap.  The donor area for the pedicle flap was then injected with anesthesia.  The flap was excised through the skin and subcutaneous tissue down to the layer of the underlying musculature.  The interpolation flap was carefully excised within this deep plane to maintain its blood supply.  The edges of the donor site were undermined.   The donor site was closed in a primary fashion.  The pedicle was then rotated into position and sutured.  Once the tube was sutured into place, adequate blood supply was confirmed with blanching and refill.  The pedicle was then wrapped with xeroform gauze and dressed appropriately with a telfa and gauze bandage to ensure continued blood supply and protect the attached pedicle.

## 2022-10-19 LAB
ALBUMIN SERPL ELPH-MCNC: 4.2 G/DL
ANION GAP SERPL CALC-SCNC: 12 MMOL/L
APPEARANCE: CLEAR
BACTERIA: NEGATIVE
BASOPHILS # BLD AUTO: 0.03 K/UL
BASOPHILS NFR BLD AUTO: 0.8 %
BILIRUBIN URINE: NEGATIVE
BLOOD URINE: NEGATIVE
BUN SERPL-MCNC: 29 MG/DL
CALCIUM SERPL-MCNC: 8.9 MG/DL
CHLORIDE SERPL-SCNC: 104 MMOL/L
CO2 SERPL-SCNC: 22 MMOL/L
COLOR: NORMAL
CREAT SERPL-MCNC: 1.65 MG/DL
CREAT SPEC-SCNC: 122 MG/DL
EGFR: 41 ML/MIN/1.73M2
EOSINOPHIL # BLD AUTO: 0.07 K/UL
EOSINOPHIL NFR BLD AUTO: 1.9 %
GLUCOSE QUALITATIVE U: NORMAL
GLUCOSE SERPL-MCNC: 152 MG/DL
HCT VFR BLD CALC: 41.8 %
HGB BLD-MCNC: 13.8 G/DL
HYALINE CASTS: 1 /LPF
IMM GRANULOCYTES NFR BLD AUTO: 0.3 %
KETONES URINE: NEGATIVE
LEUKOCYTE ESTERASE URINE: NEGATIVE
LYMPHOCYTES # BLD AUTO: 0.66 K/UL
LYMPHOCYTES NFR BLD AUTO: 18.4 %
MAN DIFF?: NORMAL
MCHC RBC-ENTMCNC: 29.9 PG
MCHC RBC-ENTMCNC: 33 GM/DL
MCV RBC AUTO: 90.7 FL
MICROALBUMIN 24H UR DL<=1MG/L-MCNC: <1.2 MG/DL
MICROALBUMIN/CREAT 24H UR-RTO: NORMAL MG/G
MICROSCOPIC-UA: NORMAL
MONOCYTES # BLD AUTO: 0.35 K/UL
MONOCYTES NFR BLD AUTO: 9.7 %
NEUTROPHILS # BLD AUTO: 2.47 K/UL
NEUTROPHILS NFR BLD AUTO: 68.9 %
NITRITE URINE: NEGATIVE
PH URINE: 6
PHOSPHATE SERPL-MCNC: 3.2 MG/DL
PLATELET # BLD AUTO: 170 K/UL
POTASSIUM SERPL-SCNC: 4.6 MMOL/L
PROTEIN URINE: NORMAL
RBC # BLD: 4.61 M/UL
RBC # FLD: 13.9 %
RED BLOOD CELLS URINE: 3 /HPF
SODIUM SERPL-SCNC: 139 MMOL/L
SPECIFIC GRAVITY URINE: 1.02
SQUAMOUS EPITHELIAL CELLS: 0 /HPF
UROBILINOGEN URINE: NORMAL
WBC # FLD AUTO: 3.59 K/UL
WHITE BLOOD CELLS URINE: 1 /HPF

## 2022-11-15 ENCOUNTER — APPOINTMENT (OUTPATIENT)
Dept: OTOLARYNGOLOGY | Facility: CLINIC | Age: 87
End: 2022-11-15

## 2022-11-15 VITALS
HEART RATE: 70 BPM | BODY MASS INDEX: 18.05 KG/M2 | WEIGHT: 115 LBS | HEIGHT: 67 IN | DIASTOLIC BLOOD PRESSURE: 74 MMHG | SYSTOLIC BLOOD PRESSURE: 137 MMHG

## 2022-11-15 PROCEDURE — 99214 OFFICE O/P EST MOD 30 MIN: CPT

## 2022-11-15 NOTE — HISTORY OF PRESENT ILLNESS
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 Denies pain, bleeding or swelling in the mouth. Denies fevers. Stable weight.\par Patient reports continued left shoulder decreased range of motion, pain continues.  Skin feels tight, feels like a pulling. \par

## 2022-11-15 NOTE — REASON FOR VISIT
[Subsequent Evaluation] : a subsequent evaluation for [FreeTextEntry2] : s/p 5/6/21 left partial glossectomy, primary closure, L MRND, left parotidectomy for benign cyst,

## 2022-11-15 NOTE — CONSULT LETTER
[Dear  ___] : Dear  [unfilled], [Courtesy Letter:] : I had the pleasure of seeing your patient, [unfilled], in my office today. [Please see my note below.] : Please see my note below. [Sincerely,] : Sincerely, [FreeTextEntry2] : Hamilton Ogden MD ([place], [place])  [FreeTextEntry3] : Bhupendra Buckner MD, FACS\par \par [facility]\par Associate Chair\par Department of Otolaryngology\par Professor\par Otolaryngology & Molecular Medicine\par [facility] [employer]\par

## 2022-12-30 ENCOUNTER — APPOINTMENT (OUTPATIENT)
Dept: NEPHROLOGY | Facility: CLINIC | Age: 87
End: 2022-12-30
Payer: MEDICARE

## 2022-12-30 VITALS
HEART RATE: 75 BPM | DIASTOLIC BLOOD PRESSURE: 71 MMHG | OXYGEN SATURATION: 96 % | HEIGHT: 67 IN | TEMPERATURE: 97.1 F | SYSTOLIC BLOOD PRESSURE: 133 MMHG | WEIGHT: 125.66 LBS | BODY MASS INDEX: 19.72 KG/M2

## 2022-12-30 VITALS — DIASTOLIC BLOOD PRESSURE: 60 MMHG | HEART RATE: 72 BPM | SYSTOLIC BLOOD PRESSURE: 130 MMHG

## 2022-12-30 VITALS — SYSTOLIC BLOOD PRESSURE: 130 MMHG | DIASTOLIC BLOOD PRESSURE: 60 MMHG

## 2022-12-30 PROCEDURE — 99214 OFFICE O/P EST MOD 30 MIN: CPT

## 2022-12-30 NOTE — ASSESSMENT
[FreeTextEntry1] : 88 yo male with renal cell s/p nephrectomy, oral cancer CKD3B and hyperkalemia. \par CKD3B relatively stable and will monitor. \par Hyperkalemia from CKD and higher K intake bc of his vegetarian diet. Improved\par Advised use of lokelma every other day and may consider tapering down if K remains normal \par Also understand the importance of nutrition and wt maintenance. \par Continue supplement for wt maintenance\par Maintain adequate hydration as well. \par \par Labs in Jan/Feb - script given to pt\par All questions were answered \par Followup in 6 months\par Daughter present for exam and understands plan as well.

## 2022-12-30 NOTE — HISTORY OF PRESENT ILLNESS
[FreeTextEntry1] : 87 year-old male here to establish renal care for CKD stage IIIb and hyperkalemia.\par Patient has a history of renal cell cancer status post nephrectomy.  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  His creatinine ranges from 1.7-2.2 range.\par He has BPH on Flomax.\par He had hyperkalemia. Taking lokelma and tolerating it. Appetite is good and drinking nepro trying nepro\par Denies chronic NSAID use.\par He is accompanied by his daughter Polina. \par He is taking care of his wife with dementia

## 2022-12-30 NOTE — REVIEW OF SYSTEMS
[As Noted in HPI] : as noted in HPI [Negative] : Heme/Lymph [FreeTextEntry4] : difficulty chewing and swallowing

## 2022-12-30 NOTE — REASON FOR VISIT
[Follow-Up] : a follow-up visit [Family Member] : family member [FreeTextEntry1] : hyperkalemia and CKD3B

## 2023-01-03 ENCOUNTER — RX RENEWAL (OUTPATIENT)
Age: 88
End: 2023-01-03

## 2023-01-08 NOTE — PHYSICAL THERAPY INITIAL EVALUATION ADULT - PRECAUTIONS/LIMITATIONS, REHAB EVAL
BP elevated to 200s on admission. Spiked to 190's on 1/7 however 2/2 resp distress and BP improved after nebulizer   - Resume home Clonidine 0.1mg BID, Losartan 100mg qD and Amlodipine 10mg qD w/hold parameters fall precautions

## 2023-02-08 LAB
ALBUMIN SERPL ELPH-MCNC: 4.5 G/DL
ANION GAP SERPL CALC-SCNC: 12 MMOL/L
BUN SERPL-MCNC: 28 MG/DL
CALCIUM SERPL-MCNC: 9.8 MG/DL
CHLORIDE SERPL-SCNC: 102 MMOL/L
CO2 SERPL-SCNC: 25 MMOL/L
CREAT SERPL-MCNC: 1.78 MG/DL
EGFR: 37 ML/MIN/1.73M2
GLUCOSE SERPL-MCNC: 155 MG/DL
PHOSPHATE SERPL-MCNC: 3.7 MG/DL
POTASSIUM SERPL-SCNC: 6 MMOL/L
SODIUM SERPL-SCNC: 139 MMOL/L

## 2023-03-14 LAB
ALBUMIN SERPL ELPH-MCNC: 4.1 G/DL
ANION GAP SERPL CALC-SCNC: 10 MMOL/L
BUN SERPL-MCNC: 24 MG/DL
CALCIUM SERPL-MCNC: 9.7 MG/DL
CHLORIDE SERPL-SCNC: 105 MMOL/L
CO2 SERPL-SCNC: 24 MMOL/L
CREAT SERPL-MCNC: 1.84 MG/DL
EGFR: 35 ML/MIN/1.73M2
GLUCOSE SERPL-MCNC: 136 MG/DL
PHOSPHATE SERPL-MCNC: 3.3 MG/DL
POTASSIUM SERPL-SCNC: 4.7 MMOL/L
SODIUM SERPL-SCNC: 139 MMOL/L

## 2023-03-21 ENCOUNTER — APPOINTMENT (OUTPATIENT)
Dept: OTOLARYNGOLOGY | Facility: CLINIC | Age: 88
End: 2023-03-21
Payer: MEDICARE

## 2023-03-21 VITALS — RESPIRATION RATE: 15 BRPM | SYSTOLIC BLOOD PRESSURE: 124 MMHG | HEART RATE: 64 BPM | DIASTOLIC BLOOD PRESSURE: 70 MMHG

## 2023-03-21 PROCEDURE — 99214 OFFICE O/P EST MOD 30 MIN: CPT

## 2023-03-21 NOTE — HISTORY OF PRESENT ILLNESS
[None] : No associated symptoms are reported. [de-identified] : Mr. Nath presents for his 4 month follow up. 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 Denies pain, dysphagia, dysphonia and or dyspne. Denies nay new oral lesion or bleeding.   \par  Stable weight.\par Left shoulder feels stronger, doing his own exercises. \par

## 2023-03-21 NOTE — CONSULT LETTER
[Dear  ___] : Dear  [unfilled], [Courtesy Letter:] : I had the pleasure of seeing your patient, [unfilled], in my office today. [Please see my note below.] : Please see my note below. [Sincerely,] : Sincerely, [FreeTextEntry2] : Hamilton Ogden MD ([place], [place])  [FreeTextEntry3] : Bhupendra Buckner MD, FACS\par \par [facility] [place]\par Associate Chair\par Department of Otolaryngology\par Professor\par Otolaryngology & Molecular Medicine\par [facility] [employer]\par

## 2023-03-24 ENCOUNTER — APPOINTMENT (OUTPATIENT)
Dept: INTERNAL MEDICINE | Facility: CLINIC | Age: 88
End: 2023-03-24
Payer: MEDICARE

## 2023-03-24 VITALS
WEIGHT: 124 LBS | OXYGEN SATURATION: 96 % | TEMPERATURE: 97.5 F | HEART RATE: 64 BPM | SYSTOLIC BLOOD PRESSURE: 116 MMHG | DIASTOLIC BLOOD PRESSURE: 69 MMHG | HEIGHT: 67 IN | BODY MASS INDEX: 19.46 KG/M2

## 2023-03-24 PROCEDURE — 99213 OFFICE O/P EST LOW 20 MIN: CPT

## 2023-03-24 NOTE — HISTORY OF PRESENT ILLNESS
[FreeTextEntry1] : Restless legs at night, with intermittent spasm [de-identified] : Feels intermittent b/l LE spasm/restless legs at night; feels better when he stands and ambulates. No fevers/chills, no known trauma to the legs.

## 2023-03-24 NOTE — ASSESSMENT
[FreeTextEntry1] : \par Suspect restless legs syndrome: trial of Ropinirole given; electrolytes checked recently, within normal limits

## 2023-03-28 ENCOUNTER — APPOINTMENT (OUTPATIENT)
Dept: RADIOLOGY | Facility: IMAGING CENTER | Age: 88
End: 2023-03-28
Payer: MEDICARE

## 2023-03-28 ENCOUNTER — OUTPATIENT (OUTPATIENT)
Dept: OUTPATIENT SERVICES | Facility: HOSPITAL | Age: 88
LOS: 1 days | End: 2023-03-28
Payer: MEDICARE

## 2023-03-28 DIAGNOSIS — Z98.890 OTHER SPECIFIED POSTPROCEDURAL STATES: Chronic | ICD-10-CM

## 2023-03-28 DIAGNOSIS — M81.0 AGE-RELATED OSTEOPOROSIS WITHOUT CURRENT PATHOLOGICAL FRACTURE: ICD-10-CM

## 2023-03-28 PROCEDURE — 77085 DXA BONE DENSITY AXL VRT FX: CPT | Mod: 26

## 2023-03-28 PROCEDURE — 77085 DXA BONE DENSITY AXL VRT FX: CPT

## 2023-04-22 NOTE — ED PROVIDER NOTE - NSICDXPASTSURGICALHX_GEN_ALL_CORE_FT
PAST SURGICAL HISTORY:  History of kidney surgery removal right 2013    History of surgery tongue 2004, 2017    History of Tonsillectomy (ICD9 V45.89) removed the cancer from tounge 2004    Retinal detachment right & left- laser surgery  bilateral    S/P cataract extraction and insertion of intraocular lens right    S/P tonsillectomy      No

## 2023-04-23 ENCOUNTER — NON-APPOINTMENT (OUTPATIENT)
Age: 88
End: 2023-04-23

## 2023-04-28 ENCOUNTER — APPOINTMENT (OUTPATIENT)
Dept: INTERNAL MEDICINE | Facility: CLINIC | Age: 88
End: 2023-04-28
Payer: MEDICARE

## 2023-04-28 VITALS — DIASTOLIC BLOOD PRESSURE: 70 MMHG | TEMPERATURE: 98.7 F | SYSTOLIC BLOOD PRESSURE: 118 MMHG

## 2023-04-28 DIAGNOSIS — J02.9 ACUTE PHARYNGITIS, UNSPECIFIED: ICD-10-CM

## 2023-04-28 PROCEDURE — 99213 OFFICE O/P EST LOW 20 MIN: CPT

## 2023-04-28 NOTE — HISTORY OF PRESENT ILLNESS
[Cold Symptoms] : cold symptoms [Moderate] : moderate [___ Weeks ago] :  [unfilled] weeks ago [Constant] : constant [Congestion] : congestion [Cough] : cough [Sore Throat] : sore throat [Wheezing] : no wheezing [Chills] : chills [Anorexia] : no anorexia [Shortness Of Breath] : no shortness of breath [Earache] : no earache [Fatigue] : not fatigue [Headache] : no headache [Fever] : no fever [Rest] : rest [Worsening] : worsening

## 2023-04-28 NOTE — ASSESSMENT
[FreeTextEntry1] : \par Suspect acute pharyngitis: given length and severity of symptoms, will start Amoxicillin

## 2023-04-28 NOTE — PHYSICAL EXAM
[Normal] : no carotid or abdominal bruits heard, no varicosities, pedal pulses are present, no peripheral edema, no extremity clubbing or cyanosis and no palpable aorta [de-identified] : watery discharge from both eyes [de-identified] : erythematous pharyngeal mucosa

## 2023-06-07 ENCOUNTER — APPOINTMENT (OUTPATIENT)
Dept: INTERNAL MEDICINE | Facility: CLINIC | Age: 88
End: 2023-06-07
Payer: MEDICARE

## 2023-06-07 VITALS
WEIGHT: 124 LBS | TEMPERATURE: 97.3 F | SYSTOLIC BLOOD PRESSURE: 126 MMHG | OXYGEN SATURATION: 98 % | BODY MASS INDEX: 19.46 KG/M2 | HEART RATE: 61 BPM | DIASTOLIC BLOOD PRESSURE: 72 MMHG | HEIGHT: 67 IN

## 2023-06-07 DIAGNOSIS — A04.8 OTHER SPECIFIED BACTERIAL INTESTINAL INFECTIONS: ICD-10-CM

## 2023-06-07 DIAGNOSIS — G25.81 RESTLESS LEGS SYNDROME: ICD-10-CM

## 2023-06-07 DIAGNOSIS — R53.83 OTHER FATIGUE: ICD-10-CM

## 2023-06-07 PROCEDURE — 99214 OFFICE O/P EST MOD 30 MIN: CPT

## 2023-06-07 NOTE — HISTORY OF PRESENT ILLNESS
[FreeTextEntry8] : 86 yo accompanied by\par \par Hyperkalemia  on lokelma\par RLS NOT TAKING    NEVER TOOK IT   on new med ropinirole since March 2023\par Mouth CA\par renal cell CA  SOLITARY KIDNEY  HIGH POTASSIUM  lokelma  QOD\par hypothyroidism\par BPH  took Claritin 2-3 weeks ago  ADVISED NO CLARITIN NO ANTIHISTAMINE\par \par nocturia 4\par < 2  0\par urgency 0\par weak  3\par incompl 0\par push 0\par stop  0\par \par \par Started gaining weight recently. Also has noted fatigue. Does morning walking. Notes the more he walks the weaker his legs. Another note  early satiety. NO PAIN  Notes a tummy Did not have a tummy before\par \par H Pylori ++  EGD  Test Of Cure BREATH TEST NEGATIVE\par

## 2023-06-07 NOTE — ASSESSMENT
[FreeTextEntry1] : 1) R/O H Pylori  Bloating\par \par 2) R/O Poorly controlled Hypothyroidism: TSH\par Weight Gain: Possibly from partially treated hypothyroidism\par There is no abdominal pain. Has NOT started ropinirole\par CBC  renal function  \par 3) RLS\par Check Fe studies for RLS\par \par RTO 4-6 weeks

## 2023-06-08 ENCOUNTER — APPOINTMENT (OUTPATIENT)
Dept: GASTROENTEROLOGY | Facility: CLINIC | Age: 88
End: 2023-06-08
Payer: MEDICARE

## 2023-06-08 VITALS
TEMPERATURE: 97.3 F | OXYGEN SATURATION: 98 % | SYSTOLIC BLOOD PRESSURE: 117 MMHG | DIASTOLIC BLOOD PRESSURE: 72 MMHG | HEART RATE: 59 BPM | BODY MASS INDEX: 18.83 KG/M2 | HEIGHT: 67 IN | WEIGHT: 120 LBS

## 2023-06-08 LAB
ALBUMIN SERPL ELPH-MCNC: 4.4 G/DL
ALP BLD-CCNC: 62 U/L
ALT SERPL-CCNC: 13 U/L
ANION GAP SERPL CALC-SCNC: 14 MMOL/L
AST SERPL-CCNC: 27 U/L
BILIRUB SERPL-MCNC: 0.3 MG/DL
BUN SERPL-MCNC: 33 MG/DL
CALCIUM SERPL-MCNC: 9.6 MG/DL
CHLORIDE SERPL-SCNC: 102 MMOL/L
CHOLEST SERPL-MCNC: 146 MG/DL
CO2 SERPL-SCNC: 22 MMOL/L
CREAT SERPL-MCNC: 2 MG/DL
EGFR: 32 ML/MIN/1.73M2
ESTIMATED AVERAGE GLUCOSE: 117 MG/DL
FERRITIN SERPL-MCNC: 77 NG/ML
GLUCOSE SERPL-MCNC: 106 MG/DL
HBA1C MFR BLD HPLC: 5.7 %
HDLC SERPL-MCNC: 54 MG/DL
IRON SATN MFR SERPL: 31 %
IRON SERPL-MCNC: 94 UG/DL
LDLC SERPL CALC-MCNC: 50 MG/DL
NONHDLC SERPL-MCNC: 92 MG/DL
POTASSIUM SERPL-SCNC: 5.4 MMOL/L
PROT SERPL-MCNC: 6.8 G/DL
SODIUM SERPL-SCNC: 138 MMOL/L
TIBC SERPL-MCNC: 300 UG/DL
TRIGL SERPL-MCNC: 209 MG/DL
TSH SERPL-ACNC: 2.39 UIU/ML
UIBC SERPL-MCNC: 205 UG/DL

## 2023-06-08 PROCEDURE — 99204 OFFICE O/P NEW MOD 45 MIN: CPT

## 2023-06-08 NOTE — REVIEW OF SYSTEMS
[As Noted in HPI] : as noted in HPI [Feeling Poorly] : not feeling poorly [Feeling Tired] : not feeling tired [Red Eyes] : eyes not red [Scleral Icterus (Yellow Eyes)] : no scleral icterus [Sore Throat] : no sore throat [Hoarseness] : no hoarseness [Chest Pain] : no chest pain [Palpitations] : no palpitations [Lower Ext Edema (lower leg swelling)] : no lower extremity edema [Wheezing] : no wheezing [SOB on Exertion] : no shortness of breath during exertion [Arthralgias (joint pain)] : no arthralgias [Joint Stiffness] : no joint stiffness [Itching] : no itching [Jaundice (yellowing of skin)] : no jaundice [Dizziness] : no dizziness [Fainting] : no fainting [Anxiety] : no anxiety [Depression] : no depression [Easy Bleeding] : no tendency for easy bleeding [Easy Bruising] : no tendency for easy bruising

## 2023-06-08 NOTE — PHYSICAL EXAM
[Alert] : alert [Normal Voice/Communication] : normal voice/communication [Healthy Appearing] : healthy appearing [No Acute Distress] : no acute distress [Sclera] : the sclera and conjunctiva were normal [Hearing Threshold Finger Rub Not [place]] : hearing was normal [Normal Appearance] : the appearance of the neck was normal [No Respiratory Distress] : no respiratory distress [Respiration, Rhythm And Depth] : normal respiratory rhythm and effort [Auscultation Breath Sounds / Voice Sounds] : lungs were clear to auscultation bilaterally [Heart Rate And Rhythm] : heart rate was normal and rhythm regular [Normal S1, S2] : normal S1 and S2 [Murmurs] : no murmurs [Bowel Sounds] : normal bowel sounds [Abdomen Tenderness] : non-tender [No Masses] : no abdominal mass palpated [Abdomen Soft] : soft [Cervical Lymph Nodes Enlarged Posterior Bilaterally] : no posterior cervical lymphadenopathy [Cervical Lymph Nodes Enlarged Anterior Bilaterally] : no anterior cervical lymphadenopathy [No CVA Tenderness] : no CVA  tenderness [No Spinal Tenderness] : no spinal tenderness [Abnormal Walk] : normal gait [Involuntary Movements] : no involuntary movements were seen [] : no rash [Skin Lesions] : no skin lesions [No Focal Deficits] : no focal deficits [Motor Exam] : the motor exam was normal [Oriented To Time, Place, And Person] : oriented to person, place, and time [Normal Affect] : the affect was normal [Normal Mood] : the mood was normal

## 2023-06-08 NOTE — ASSESSMENT
[FreeTextEntry1] : Impression:\par # Abdominal Bloating: Given age and background ([facility]), offered endoscopic evaluation. Given symptoms began with change in diet\par \par Plan:\par - Currently refusing endoscopic evaluation.\par - Would avoid butter, cheese and other diary products\par - PPI daily\par - RTC in 6 weeks.

## 2023-06-08 NOTE — HISTORY OF PRESENT ILLNESS
[FreeTextEntry1] : 87 year old man with hx of RLS, CKD, COPD, Hypothyroidism, nasopharyngeal carcinoma s/p surgery, renal cancer s/p resection presents for evaluation of abdominal bloating.\par \par Patient reports abdominal bloating for the past 2-3 weeks. Patient denies fevers, chills, chest pain, SOB, nausea, vomiting, diarrhea, melena, hematochezia, hematemesis, dysphagia, odynophagia, headache, dizziness, abdominal pain and recent travel. Reports symptoms occurs daily and is associated with some decreased PO intake, but weight gain. Denies having recent symptoms similar to this in the past. Of note, reports increase in butter and cheese intake to facilitate weight gain. Reports prior EGD 10-20 years ago, which was unremarkable.

## 2023-06-12 LAB — H PYLORI AG STL QL: NEGATIVE

## 2023-06-29 ENCOUNTER — APPOINTMENT (OUTPATIENT)
Dept: NEPHROLOGY | Facility: CLINIC | Age: 88
End: 2023-06-29
Payer: MEDICARE

## 2023-06-29 VITALS
TEMPERATURE: 97.2 F | DIASTOLIC BLOOD PRESSURE: 77 MMHG | OXYGEN SATURATION: 97 % | HEART RATE: 64 BPM | HEIGHT: 67 IN | WEIGHT: 124.34 LBS | SYSTOLIC BLOOD PRESSURE: 129 MMHG | BODY MASS INDEX: 19.52 KG/M2

## 2023-06-29 DIAGNOSIS — N18.4 CHRONIC KIDNEY DISEASE, STAGE 4 (SEVERE): ICD-10-CM

## 2023-06-29 PROCEDURE — 99214 OFFICE O/P EST MOD 30 MIN: CPT

## 2023-06-29 NOTE — HISTORY OF PRESENT ILLNESS
[FreeTextEntry1] : 87 year-old male here to establish renal care for CKD stage IIIb and hyperkalemia.\par Patient has a history of renal cell cancer status post nephrectomy.  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  His creatinine ranges from 1.7-2.2 range.\par He has BPH on Flomax.\par He had hyperkalemia. Taking lokelma every other day.\par He is seeing GI because he is having abdominal bloating.  Was advised to stay off dairy for couple weeks.\par Denies chronic NSAID use.\par He is accompanied by his daughter Polina. \par He is taking care of his wife with dementia\par

## 2023-06-29 NOTE — ASSESSMENT
[FreeTextEntry1] : 88 yo male with renal cell s/p nephrectomy, oral cancer CKD3B and hyperkalemia. \par CKD3B relatively stable and will monitor. \par Hyperkalemia from CKD and higher K intake bc of his vegetarian diet. Improved on Lokelma\par Advised use of lokelma and can switch to a regular Monday Wednesday Friday schedule\par Also understand the importance of nutrition and wt maintenance. \par Maintain adequate hydration as well. \par All questions were answered \par Followup in 6 months\par Daughter present for exam and understands plan as well.

## 2023-06-29 NOTE — PHYSICAL EXAM
[General Appearance - Alert] : alert [General Appearance - In No Acute Distress] : in no acute distress [FreeTextEntry1] : thin male [Sclera] : the sclera and conjunctiva were normal [Outer Ear] : the ears and nose were normal in appearance [Neck Appearance] : the appearance of the neck was normal [Auscultation Breath Sounds / Voice Sounds] : lungs were clear to auscultation bilaterally [Heart Rate And Rhythm] : heart rate was normal and rhythm regular [Heart Sounds] : normal S1 and S2 [Murmurs] : no murmurs [Heart Sounds Pericardial Friction Rub] : no pericardial rub [Edema] : there was no peripheral edema [Bowel Sounds] : normal bowel sounds [Abdomen Tenderness] : non-tender [Abdomen Soft] : soft [No CVA Tenderness] : no ~M costovertebral angle tenderness [Involuntary Movements] : no involuntary movements were seen [] : no rash [No Focal Deficits] : no focal deficits [Oriented To Time, Place, And Person] : oriented to person, place, and time [Affect] : the affect was normal [Mood] : the mood was normal

## 2023-07-19 ENCOUNTER — RX RENEWAL (OUTPATIENT)
Age: 88
End: 2023-07-19

## 2023-07-19 ENCOUNTER — APPOINTMENT (OUTPATIENT)
Dept: INTERNAL MEDICINE | Facility: CLINIC | Age: 88
End: 2023-07-19
Payer: MEDICARE

## 2023-07-19 VITALS
OXYGEN SATURATION: 98 % | HEART RATE: 66 BPM | HEIGHT: 67 IN | WEIGHT: 125 LBS | BODY MASS INDEX: 19.62 KG/M2 | DIASTOLIC BLOOD PRESSURE: 80 MMHG | SYSTOLIC BLOOD PRESSURE: 110 MMHG

## 2023-07-19 LAB
APTT BLD: 32.4 SEC
INR PPP: 1 RATIO
PT BLD: 11.8 SEC

## 2023-07-19 PROCEDURE — 99213 OFFICE O/P EST LOW 20 MIN: CPT

## 2023-07-19 RX ORDER — ROPINIROLE 1 MG/1
1 TABLET, FILM COATED ORAL
Qty: 30 | Refills: 3 | Status: DISCONTINUED | COMMUNITY
Start: 2023-03-24 | End: 2023-06-29

## 2023-07-19 NOTE — HISTORY OF PRESENT ILLNESS
[de-identified] : 88 yo RCC solitary kidney\par \par Recent weight gain: discovered recent increased daily butter intake\par H pylori + treated  post treatment NEG\par Abd bloating H pylori NEG\par Did not start ropinirole\par \par Given daily PPI  Told avoid dairy cheese  Scheduled for endoscopy  BLOATING EARLY SATIETY\par Current LEVO  therapeutic TSH\par \par \par \par GI for bloating\par Potassium was high so increased lokelma\par Renal cell CA nephrectomy\par CKD stable

## 2023-07-19 NOTE — ASSESSMENT
[FreeTextEntry1] : Appears benign  No ASA taken\par Coags and plts check  Nl plts last month\par \par EGD pending\par CPE Oct\par Do not worry about non fasting

## 2023-07-19 NOTE — PHYSICAL EXAM
[No Respiratory Distress] : no respiratory distress  [No Accessory Muscle Use] : no accessory muscle use [Clear to Auscultation] : lungs were clear to auscultation bilaterally [Normal Rate] : normal rate  [Regular Rhythm] : with a regular rhythm [de-identified] : appears like burn  Perfect linear/cylindrical bruise

## 2023-07-20 LAB
ANION GAP SERPL CALC-SCNC: 9 MMOL/L
BUN SERPL-MCNC: 27 MG/DL
CALCIUM SERPL-MCNC: 9.5 MG/DL
CHLORIDE SERPL-SCNC: 103 MMOL/L
CO2 SERPL-SCNC: 24 MMOL/L
CREAT SERPL-MCNC: 1.81 MG/DL
EGFR: 36 ML/MIN/1.73M2
GLUCOSE SERPL-MCNC: 120 MG/DL
PLATELET # PLAS AUTO: 168 K/UL
POTASSIUM SERPL-SCNC: 6.1 MMOL/L
SODIUM SERPL-SCNC: 136 MMOL/L

## 2023-07-27 ENCOUNTER — APPOINTMENT (OUTPATIENT)
Dept: GASTROENTEROLOGY | Facility: CLINIC | Age: 88
End: 2023-07-27
Payer: MEDICARE

## 2023-07-27 VITALS
TEMPERATURE: 97.3 F | WEIGHT: 119 LBS | HEART RATE: 75 BPM | DIASTOLIC BLOOD PRESSURE: 81 MMHG | OXYGEN SATURATION: 97 % | BODY MASS INDEX: 18.68 KG/M2 | HEIGHT: 67 IN | SYSTOLIC BLOOD PRESSURE: 127 MMHG

## 2023-07-27 PROCEDURE — 99214 OFFICE O/P EST MOD 30 MIN: CPT

## 2023-07-27 NOTE — HISTORY OF PRESENT ILLNESS
[FreeTextEntry1] : 87 year old man with hx of RLS, CKD, COPD, Hypothyroidism, nasopharyngeal carcinoma s/p surgery, renal cancer s/p resection presents for follow up of abdominal bloating.\par \par From Previous Visit:\par Patient reports abdominal bloating for the past 2-3 weeks.  Reports symptoms occurs daily and is associated with some decreased PO intake, but weight gain. Denies having recent symptoms similar to this in the past. Of note, reports increase in butter and cheese intake to facilitate weight gain. Reports prior EGD 10-20 years ago, which was unremarkable. \par \par Current Visit:\par Patient decreased butter and overall lactose intake. Since then has had significant improvement in symptoms and today feels well without complaints. Patient denies fevers, chills, chest pain, SOB, nausea, vomiting, diarrhea, melena, hematochezia, hematemesis, dysphagia, odynophagia, headache, dizziness, abdominal pain and recent travel.

## 2023-07-27 NOTE — PHYSICAL EXAM
[Alert] : alert [Normal Voice/Communication] : normal voice/communication [Healthy Appearing] : healthy appearing [No Acute Distress] : no acute distress [Sclera] : the sclera and conjunctiva were normal [Hearing Threshold Finger Rub Not [place]] : hearing was normal [Normal Appearance] : the appearance of the neck was normal [No Respiratory Distress] : no respiratory distress [Respiration, Rhythm And Depth] : normal respiratory rhythm and effort [Auscultation Breath Sounds / Voice Sounds] : lungs were clear to auscultation bilaterally [Heart Rate And Rhythm] : heart rate was normal and rhythm regular [Normal S1, S2] : normal S1 and S2 [Murmurs] : no murmurs [Bowel Sounds] : normal bowel sounds [Abdomen Tenderness] : non-tender [No Masses] : no abdominal mass palpated [Abdomen Soft] : soft [Cervical Lymph Nodes Enlarged Posterior Bilaterally] : no posterior cervical lymphadenopathy [Cervical Lymph Nodes Enlarged Anterior Bilaterally] : no anterior cervical lymphadenopathy [No CVA Tenderness] : no CVA  tenderness [No Spinal Tenderness] : no spinal tenderness [Abnormal Walk] : normal gait [Involuntary Movements] : no involuntary movements were seen [] : no rash [Skin Lesions] : no skin lesions [No Focal Deficits] : no focal deficits [Motor Exam] : the motor exam was normal [Oriented To Time, Place, And Person] : oriented to person, place, and time [Normal Affect] : the affect was normal [Normal Mood] : the mood was normal 10

## 2023-07-27 NOTE — ASSESSMENT
[FreeTextEntry1] : Impression:\par # Abdominal Bloating: Resolved with changes in diet. \par \par Plan:\par - Refused endoscopic evaluation.\par - Avoid lactose products, may introduce small amounts if able to tolerate. \par - RTC PRN

## 2023-07-27 NOTE — REVIEW OF SYSTEMS
[Feeling Poorly] : not feeling poorly [Feeling Tired] : not feeling tired [Red Eyes] : eyes not red [Scleral Icterus (Yellow Eyes)] : no scleral icterus [Sore Throat] : no sore throat [Hoarseness] : no hoarseness [Chest Pain] : no chest pain [Palpitations] : no palpitations [Lower Ext Edema (lower leg swelling)] : no lower extremity edema [Wheezing] : no wheezing [SOB on Exertion] : no shortness of breath during exertion [As Noted in HPI] : as noted in HPI [Arthralgias (joint pain)] : no arthralgias [Joint Stiffness] : no joint stiffness [Itching] : no itching [Jaundice (yellowing of skin)] : no jaundice [Dizziness] : no dizziness [Fainting] : no fainting [Anxiety] : no anxiety [Depression] : no depression [Easy Bleeding] : no tendency for easy bleeding [Easy Bruising] : no tendency for easy bruising

## 2023-08-09 ENCOUNTER — APPOINTMENT (OUTPATIENT)
Dept: OTOLARYNGOLOGY | Facility: CLINIC | Age: 88
End: 2023-08-09
Payer: MEDICARE

## 2023-08-09 VITALS
SYSTOLIC BLOOD PRESSURE: 130 MMHG | WEIGHT: 119 LBS | BODY MASS INDEX: 18.68 KG/M2 | HEART RATE: 71 BPM | HEIGHT: 67 IN | DIASTOLIC BLOOD PRESSURE: 79 MMHG | OXYGEN SATURATION: 97 %

## 2023-08-09 PROCEDURE — 99214 OFFICE O/P EST MOD 30 MIN: CPT

## 2023-08-09 NOTE — HISTORY OF PRESENT ILLNESS
[de-identified] : Mr. 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  Reports ability to produce saliva changes frequently--sometimes has a lot and sometimes very little.  Left shoulder feels stronger, doing his own exercises at home.  Patient denies throat pain, new oral lesions/bleeding, globus sensation, dysphagia, odynophagia, dyspnea, dysphonia, hemoptysis or otalgia.  Denies recent fevers/infections, chills, night sweats, weight loss.

## 2023-08-09 NOTE — CONSULT LETTER
[Dear  ___] : Dear  [unfilled], [Consult Letter:] : I had the pleasure of evaluating your patient, [unfilled]. [Please see my note below.] : Please see my note below. [Consult Closing:] : Thank you very much for allowing me to participate in the care of this patient.  If you have any questions, please do not hesitate to contact me. [Sincerely,] : Sincerely, [FreeTextEntry2] : Hamilton Ogden MD ([place], [place]) [FreeTextEntry3] : Bhupendra Buckner MD, FACS  [facility] Associate Chair Department of Otolaryngology Professor Otolaryngology & Molecular Medicine [facility]

## 2023-08-09 NOTE — REASON FOR VISIT
[Subsequent Evaluation] : a subsequent evaluation for [Other: _____] : [unfilled] [FreeTextEntry2] : s/p 5/6/21 left partial glossectomy, primary closure, L MRND, left parotidectomy for benign cyst,

## 2023-08-11 ENCOUNTER — NON-APPOINTMENT (OUTPATIENT)
Age: 88
End: 2023-08-11

## 2023-08-11 ENCOUNTER — APPOINTMENT (OUTPATIENT)
Dept: OPHTHALMOLOGY | Facility: CLINIC | Age: 88
End: 2023-08-11
Payer: MEDICARE

## 2023-08-11 PROCEDURE — 92004 COMPRE OPH EXAM NEW PT 1/>: CPT | Mod: 25

## 2023-08-11 PROCEDURE — 92134 CPTRZ OPH DX IMG PST SGM RTA: CPT

## 2023-09-07 ENCOUNTER — APPOINTMENT (OUTPATIENT)
Dept: UROLOGY | Facility: CLINIC | Age: 88
End: 2023-09-07
Payer: MEDICARE

## 2023-09-07 VITALS — HEART RATE: 82 BPM | TEMPERATURE: 97.2 F | SYSTOLIC BLOOD PRESSURE: 117 MMHG | DIASTOLIC BLOOD PRESSURE: 71 MMHG

## 2023-09-07 LAB
PSA FREE FLD-MCNC: 35 %
PSA FREE SERPL-MCNC: 1.07 NG/ML
PSA SERPL-MCNC: 3.07 NG/ML

## 2023-09-07 PROCEDURE — 99214 OFFICE O/P EST MOD 30 MIN: CPT

## 2023-09-07 NOTE — ADDENDUM
[FreeTextEntry1] : Entered by Avery Reis, acting as scribe for Dr. Forrest Rodriguez. The documentation recorded by the scribe accurately reflects the service I personally performed and the decisions made by me.

## 2023-09-07 NOTE — LETTER BODY
[FreeTextEntry1] : Santi Newell MD [address] [place] [address] [address],  [place] [address] [phone]  Dear Dr. Newell,    Reason for Visit: Previous right renal cell carcinoma. BPH.    This is a 87 year-old gentleman with chronic BPH and previous renal cell carcinoma status post right radical nephrectomy 10 years ago. 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 His ultrasound and chest x-ray in 2017 revealed no recurrence of malignancy. Patient returns for follow-up. He reports he continues to take Flomax regularly without any side effects or difficulties. He reports stable symptoms on medical therapy. He denies any hematuria or dysuria. His wife has progressive dementia for past 9 years. This impacts his sleep. There is seeking options to improve his sleep as he cares for his wife at nighttime. The past medical history, family history and social history are unchanged. All other review of systems are negative. Patient denies any changes in medications. Medication list was reconciled. He is planning a cruise trip soon.    On examination, the patient is an elderly appearing gentleman in no acute distress. He is alert and oriented follows commands. He has normal mood and affect. He is normocephalic. Neck is supple. Respirations are unlabored. His abdomen is soft and nontender. Bladder is nonpalpable. No CVA tenderness. Neurologically he is grossly intact. No peripheral edema. Skin without gross abnormality.    His lastPSA was 3.62. His creatinine was 1.8.    Assessment: Previous renal cell carcinoma. BPH, with nocturia. Tongue cancer. Elevated potassium.    I counseled the patient. In terms of his BPH, patient reports stable symptoms of BPH. I renewed his prescription for Flomax today. I encouraged him to continue Flomax as needed. In terms of his elevated potassium level, he will repeat BMP today. In terms of previous kidney cancer, follow up imaging is not indicated. I reassured patient. His creatinine is 1.8. The patient will follow up as directed and will contact me with any questions or concerns. Thank you for the opportunity to participate in the care of Mr. MENDOZA. I will keep you updated on his progress.    Plan: Continue Flomax. Repeat PSA. Follow-up 1 year.

## 2023-09-07 NOTE — HISTORY OF PRESENT ILLNESS
[FreeTextEntry1] : Follow-up BPH.  Flomax.  Repeat PSA.  Follow-up 1 year.  Patient is a previous kidney cancer.  He has surgery 10 years ago.  Follow-up imaging is not indicated.  Reassured patient.  His creatinine is 1.8.  His wife has progressive dementia for past 9 years.  This impacts her sleep.  There is seeking options to improve his sleep as he cares for his wife at nighttime.  Please refer to [facility] Consult note

## 2023-09-19 ENCOUNTER — APPOINTMENT (OUTPATIENT)
Dept: GASTROENTEROLOGY | Facility: CLINIC | Age: 88
End: 2023-09-19

## 2023-09-25 ENCOUNTER — APPOINTMENT (OUTPATIENT)
Dept: INTERNAL MEDICINE | Facility: CLINIC | Age: 88
End: 2023-09-25
Payer: MEDICARE

## 2023-09-25 ENCOUNTER — OUTPATIENT (OUTPATIENT)
Dept: OUTPATIENT SERVICES | Facility: HOSPITAL | Age: 88
LOS: 1 days | End: 2023-09-25
Payer: MEDICARE

## 2023-09-25 ENCOUNTER — NON-APPOINTMENT (OUTPATIENT)
Age: 88
End: 2023-09-25

## 2023-09-25 VITALS
DIASTOLIC BLOOD PRESSURE: 50 MMHG | SYSTOLIC BLOOD PRESSURE: 120 MMHG | WEIGHT: 120 LBS | HEIGHT: 67 IN | TEMPERATURE: 99.1 F | BODY MASS INDEX: 18.83 KG/M2 | HEART RATE: 74 BPM | OXYGEN SATURATION: 98 %

## 2023-09-25 DIAGNOSIS — R05.9 COUGH, UNSPECIFIED: ICD-10-CM

## 2023-09-25 DIAGNOSIS — I10 ESSENTIAL (PRIMARY) HYPERTENSION: ICD-10-CM

## 2023-09-25 DIAGNOSIS — Z98.890 OTHER SPECIFIED POSTPROCEDURAL STATES: Chronic | ICD-10-CM

## 2023-09-25 PROCEDURE — 99214 OFFICE O/P EST MOD 30 MIN: CPT

## 2023-09-25 PROCEDURE — G0463: CPT

## 2023-09-25 RX ORDER — AMOXICILLIN 500 MG/1
500 TABLET, FILM COATED ORAL TWICE DAILY
Qty: 14 | Refills: 0 | Status: COMPLETED | COMMUNITY
Start: 2023-04-28 | End: 2023-10-02

## 2023-09-28 NOTE — PRE-OP CHECKLIST - WEIGHT IN LBS
[place] [facility] ENCOUNTER  INTERVENTIONAL PAIN MANAGEMENT CONSULT    REQUESTING PROVIDER:  Volodymyr Rico MD     CHIEF COMPLAINT:  Office Visit (NP right shoulder  and back pain LLE>RLE)       PERTINENT CARE TEAM:  PCP: Fredy Salgado MD    Neurology (referring):  Volodymyr Alanis MD  Orthopaedic surgery (b/l shoulder):  Yuriy Rodríguez PA-C ([facility])  [facility]  Niall Wong MD       SUBJECTIVE:    Mary Douglas is a 84 year old female seen today as a consultation at the kind request of Volodymyr Rico MD for right sided neck pain radiating into the RUE to the 1st-2nd digits as well as low back pain radiating into the the anterior LLE to the foot with associated paresthesias. This pain is present mainly with standing and laying flat.     The pain is described as sharp, numb, tender, burning, tingling in character.   It is rated 8-9/10, worsened and improved with nothing in particular.      She does endorse associated swelling of the right wrist and hand as well as tenderness. She is unable to lift her arm past 60 degrees secondary to pain.      Patient presents in a motorized scooter.  She is unable to walk, but does stand.    Patient is accompanied by their daughter and gives permission to speak freely in their presence.      Pt endorses numbness/tingling/weakness as above, but otherwise denies clinically relevant bowel/bladder incontinence, saddle anesthesia, recent fever, infection, or antibiotic use.     PAIN COURSE AND PREVIOUS INTERVENTIONS:  Medications:  Diclofenac 1% gel, Ativan, Norco  mg qty #30/ 10 days,  [Zoloft, Ativan, Ambien]  Failed/prev:  Interventions:    2021 b/l glenohumeral injections   07/08/20- Right L2-3 RFA ([facility])  06/24/20- Left L3 L4 L5 RFA  03/04/20- B/L L4-5 MBB #2  01/30/20- B/L L4-5 MBB #1  01/02/20- B/L T1-2, Lumbar 1-2 MBB  10/26, 11/09/04 Lumbar/sacral JOSE ([facility])  Adjuvants: PT    BLOOD THINNING MEDICATIONS:  None    Pertinent Surgical  History:  08/2010- TKR (Right)  2005 C laminectomy   2004 C5-C7 fusion  1987- THR (Right)  1977- THR Right x2 revision  Unknown date Lumbar back surgery- L3-L5 posterior fusion and laminectomy.    Mental Health/Substance Use History:  None    Pertinent Comorbidities:  HLD  Personal hx of venous thrombosis and embolism  GERD  Edema   Malaise and fatigue  Headache       MEDICATIONS:    Current Outpatient Medications   Medication Sig Dispense Refill   • DULoxetine (CYMBALTA) 30 MG capsule      • alendronate (FOSAMAX) 70 MG tablet      • fluticasone (FLONASE) 50 MCG/ACT nasal spray Spray 1 spray in each nostril daily.     • furosemide (LASIX) 40 MG tablet Take 40 mg by mouth daily.     • HYDROcodone-acetaminophen (NORCO)  MG per tablet Take 325 tablets by mouth daily.     • diclofenac (diclofenac) 1 % gel Apply 4 g topically 4 times daily as needed.     • LORazepam (ATIVAN) 1 MG tablet Take 1 mg by mouth 2 times daily as needed.     • oxyCODONE-acetaminophen (PERCOCET) 5-325 MG per tablet Take 1 tablet by mouth every 4 hours as needed.     • gabapentin (NEURONTIN) 300 MG capsule Take 300 mg by mouth 3 times daily.     • DICLOFENAC POTASSIUM PO Take by mouth 2 times daily.     • ergocalciferol (DRISDOL) 33596 units capsule Take 50,000 Units by mouth once a week.     • sertraline (ZOLOFT) 25 MG tablet Take 1 tablet by mouth daily. 30 tablet 0   • LORazepam (ATIVAN) 0.5 MG tablet Take 0.5 mg by mouth every 6 hours as needed.     • AMBIEN 10 MG PO TABS 1 TABLET BY MOUTH AT BEDTIME 15 0   • PERCOCET 5-325 MG PO TABS 1 TABLET EVERY 6 HOURS AS NEEDED 90 0     No current facility-administered medications for this visit.       PROBLEM LIST:    Patient Active Problem List   Diagnosis   • Edema   • Lumbosacral spondylosis without myelopathy   • Other malaise and fatigue   • Loss of weight   • Personal history of malignant neoplasm of breast   • GERD   • Headache(784.0)   • Other and unspecified hyperlipidemia   • Personal  history of venous thrombosis and embolism   • Spinal stenosis in cervical region   • Tear film insufficiency, unspecified   • Blepharitis, unspecified   • Dermatochalasis   • Lumbosacral spondylosis without myelopathy   • ARMD (age-related macular degeneration), bilateral   • Senile nuclear sclerosis   • Lumbosacral spondylosis without myelopathy   • Spinal stenosis, lumbar region, without neurogenic claudication   • Backache, unspecified   • RT sh ASD DCR Debr Bi Tenotomy 6/2/11   • Cervicalgia   • Sensorineural hearing loss, bilateral   • Subjective tinnitus of both ears   • Arthritis of right glenohumeral joint       HISTORIES:    ALLERGIES:   Allergen Reactions   • Penicillin V HIVES and RASH   • Meperidine And Related ANXIETY     nervous   • Pregabalin Other (See Comments)     \"Black out\"   • Atorvastatin      Lipitor caused LFT elevation       Past Medical History:   Diagnosis Date   • Acute pancreatitis 3/23/2009   • Degeneration of lumbar or lumbosacral intervertebral disc     DDD lumbar spine   • Depressive disorder, not elsewhere classified     since  passed in 3/02   • Esophageal reflux     GERD   • Fatty liver 09/2018   • Headache(784.0)     headache   • Lumbosacral spondylosis without myelopathy     spondyolosis   • Malignant neoplasm of breast (female), unspecified site 1996    R-Breast Cancer,lumpectomy and radiation treatment   • Mixed hyperlipidemia     Hyperlipidemia   • PAST MEDICAL HISTORY     sciatica   • Phlebitis and thrombophlebitis of other deep vessels of lower extremities 1974 and 1977 1974-DVT after R hip surgery.  1977-DVT and PE after Hip surgery.        Past Surgical History:   Procedure Laterality Date   • Anesth,hip joint procedure  2003    R hip revision (all except stem)   • Appendectomy  1964   • Arthroscopy shoulder decompress subacromial space w part acromioplasty  6/2/2011    right shoulder arthroscopy,SAD,DCR,biceps tenotomy,ext.rishi  PM   • Cardiac stress test  complete  3/04    Dipyridamole Cardiolyte Stress Test- done by Dr. Louis, was NL per pt but we don't have a copy of the report.   • Colonoscopy diagnostic      Colonoscopy-diverticulosis per pt.   • Dexa bone density axial skeleton   8/10/2006   • Inj epid/subchd anest,lumb/sac  10/26, 04    [place]bar. Dr Gutierrez [facility] [place]   • Laminectomy,cervical  2005    Laminectomy Cervical   • Laparoscopy,tubal cautery      Tubal Ligation w/ 1 ovary removed; unsure which side   • Mastectomy partial      R-breast lumpectomy and postop radiation tx   • Past surgical history  04    Corpectomy of C6 w C5-C7 fusion   • Removal gallbladder      Cholecystectomy   • Remove tonsils/adenoids,<15 y/o      Tonsillectomy w Adenoids   • Total hip replacement      R-total hip replacement; times 2 for revision the 2nd time   • Total hip replacement      R hip   • Total knee replacement  2010    Knee Replacement, Total  right        Social History     Socioeconomic History   • Marital status:      Spouse name: Not on file   • Number of children: 3   • Years of education: Not on file   • Highest education level: Not on file   Occupational History   • Occupation:      Comment: [employer] in [place]     Employer: [employer]   Tobacco Use   • Smoking status: Former     Current packs/day: 0.00     Average packs/day: 1 pack/day for 35.0 years (35.0 ttl pk-yrs)     Types: Cigarettes     Start date: 1957     Quit date: 1992     Years since quittin.1   • Smokeless tobacco: Never   Substance and Sexual Activity   • Alcohol use: No     Comment: none   • Drug use: No   • Sexual activity: Not Currently   Other Topics Concern   • Not on file   Social History Narrative   • Not on file     Social Determinants of Health     Financial Resource Strain: Not on file   Food Insecurity: Not on file   Transportation Needs: Not on file   Physical Activity: Not on file   Stress: Not on file    Social Connections: Not on file   Intimate Partner Violence: Not on file       I have reviewed the family history and social history as listed in the medical record as obtained by my nursing staff and support staff and agree with their documentation.    I have reviewed the patient's pertinent medical records.    REVIEW OF SYSTEMS:   Reviewed. As per RN (or MA) note and my HPI above.      OBJECTIVE:    PHYSICAL EXAMINATION:   Vitals:   Visit Vitals  Ht 5' (1.524 m)   Wt 80.7 kg (178 lb)   BMI 34.76 kg/m²      Constitutional: Obese,  well-developed, well-nourished female in no acute distress. Presents in motorized scooter  Head: normocephalic, atraumatic  ENT: Conjunctiva non-injected  Skin: Warm, dry, intact without rash or lesion.  Psych:  The patient's mood is normal, and appropriate for the circumstances.  Cardiovascular: well-perfused extremities, no peripheral edema  Pulmonary:  Non-labored respirations, no stridor noted  Abdomen: Non-distended  Neurologic: Coordination intact, Facial nerves intact.  Musculoskeletal:   MOTOR EXAMINATION:  UPPER EXTREMITY      RIGHT LEFT   Deltoid 5/5 5/5   Biceps 5/5 5/5   Triceps 5/5 5/5   Brachioradialis 5/5 5/5   Wrist Flexors 5/5 5/5   Wrist Extensors 5/5 5/5   Intrinsic Hand 5/5 5/5    5/5 5/5     LOWER EXTREMITY      RIGHT LEFT   Iliopsoas 5/5 +4/5   Quadriceps 5/5 5/5   Hamstrings 5/5 -4/5   Foot Dorsiflexion 5/5 5/5   Extensor hallucis longus 5/5 5/5   Gastrocnemius 5/5 5/5     No abnormal muscle tone, movements, or tremors noted. No muscular atrophy.    REFLEXES:  Right Deep tendon reflexes:    Difficult to assess reflexes secondary to immobility    Left Deep tendon reflexes:    Difficult to assess reflexes secondary to immobility    Sensation  Upper Extremity       Right Left   C5 Lateral arm intact intact   C6 Lateral forearm, thumb, index, 1/2 middle finger intact intact   C7 Middle finger intact intact   C8 Ring and little fingers, medial forearm intact intact    T1 Medial arm intact intact        Sensation  Decreased sensation to light touch over left lower extremity diffusely compared to right lower extremity      TENDERNESS    Cervical paraspinal musculature nontender  bilaterally   Cervical spinous processes nontender     Trapezius musculature nontender  bilaterally   Rhomboid musculature nontender  bilaterally   Occipital regions nontender  bilaterally   Lumbar paraspinal musculature tender bilaterally   Lumbar Facet Joints tender   Sacroiliac joints tender bilaterally     CERVICAL SPINE:  RANGE OF MOTION ACTIVE   Flexion chin to chest   Extension 10 degrees     Rotation:  50% bilaterally  Pain with flexion: positive  Pain with extension: positive    TESTS PERFORMED:   Spurling maneuver: negative bilaterally  Lhermitte test: negative    Forward flexion of shoulder limited to 60°    LUMBAR SPINE:    The thoracolumbar spine has a normal alignment      TESTS PERFORMED:   Seated slump: positive left          LABORATORY DATA:    PLT (x10^3 uL)   Date Value   05/23/2011 341     INR (no units)   Date Value   07/22/2004 0.9     GFR Estimate, Non  (no units)   Date Value   05/23/2011 >60     GFR  (no units)   Date Value   06/09/2010 >60     No results found for: \"GFRESTIMATE\"  No results found for: \"HGBA1C\"      IMAGING STUDIES:      05/08/22- XR hip 2 vws left: [facility]  Findings: The osseous structures appear somewhat  demineralized. No acute fracture or malalignment. Mild  irregularity of the right inferior ramus may be due to old  trauma. Minor degenerative changes in the left hip. The  pubic symphysis is degenerative. Partially visualized   posterior metallic lumbar fusion with pedicle screws and  interconnecting rods. Partially visualized right hip  arthroplasty. External urinary catheter noted.  IMPRESSION No acute fracture or malalignment.      04/29/22- XR Lumbar spine AP lat flex ext: [facility]  FINDINGS: Dextroscoliosis. Posterior  metallic L3 through L5  fusion with lateral osseous fusion as well. Unchanged  alignment. No hardware complication. Multilevel laminectomy  defects in the fused levels. Advanced L2-L3 at L1-L2 disc disease. No spondylolisthesis.  Diffuse osseous demineralization. Right total hip  arthroplasty partially visualized without complication.   Dense aortic calcification.  IMPRESSION  1. No acute abnormality.   2. Posterior metallic lateral osseous L3 through L5 fusion without complication.   3. Dextroscoliosis of the lumbar spine with advanced disc disease above the fused levels.    09/18/19- MRI Thoracic Spine wo contrast: [facility]  IMPRESSION  1. Multilevel spondylosis, most advanced involving the T8-T9  disc and the left T11-T12 & T12-L1 facet joints. Mild  scoliosis, no significant listhesis.   2. Mild/moderate spinal canal narrowing without spinal cord  impingement at T11-T12 & T12-L1, milder narrowing  elsewhere. Normal signal intensity in the spinal cord.   3. Severe neural foramen stenosis with nerve root  impingement on the right at T10-T11; moderate stenosis with  possible nerve root impingement on the right at T2-T3 & T8-T9 and on the left at T12-L1. Milder narrowing elsewhere.    07/31/19 MRI Cervical spine wo contrast:  IMPRESSION  1. No change, but only identified compared to March 2016.   2. Multilevel laminectomy, anterior & posterior cervical  fusion. Intervertebral arthrodesis spanning C3-C7; unable to  assess posterior element arthrodesis on this exam. Mild mid  cervical kyphosis, minimal anterolisthesis at C2-C3.   3. No significant spinal canal stenosis. Suspect mild to moderate multilevel cervical neural foramen stenosis, likely most pronounced on the left at C5-C6 & C6-C7 (but hardware  artifacts and postoperative distortion hamper assessment).   At least moderate neural foramen stenosis, with suspected nerve root impingement, on the left at T1-T2 and on the right at T2-T3; mild contralaterally at  these levels.    L MRI 2019:  1. Unchanged postsurgical findings of posterior fusion of L3-L5 and laminectomies of L3-L5.   2. Unchanged multilevel degenerative disc disease most prominent at the L2-L3 with severe spinal canal stenosis no CSF noted and the thecal sac.   3. Unchanged multilevel facet arthropathy resulting in moderate bilateral foraminal narrowing at L2-L3, L4-L5, and L5-S1.      I personally reviewed the images pertinent to this patient's visit.     PDMP Reviewed    ASSESSMENT:    1. Cervical radiculopathy    2. Failed neck syndrome    3. Right hand pain    4. Right wrist pain    5. Left lumbar radiculopathy    6. Failed back syndrome of lumbar spine    7. Spinal stenosis of lumbar region with neurogenic claudication       Mary Douglas is a 84 year old female with PMHX significant for cervical and lumbar fusions presenting with neck and RUE pain concerning for C6 radiculopathy complicated by known severe shoulder OA with possible R wrist/hand issues given swelling and tenderness.     Additionally with low back and L anterior thigh pain consistent with L3 radiculopathy due to spinal stenosis with neurogenic claudication.     PLAN:  RECOMMENDATIONS:  1) Medical Modalities: no changes  2) Interventional Modalities:  -Consider LESI (note previous fusion)  -Consider R suprascapular NB and C7-T1 ILESI (note previous fusion)  -Takes no anticoagulants   3) Behavioral Medicine Modalities: none  4) Other Modalities: Continue Home Exercise Therapy  5) Imaging/Labs:   -R wrist and hand XR  -C and L MRI noncontrast  -RUE EMG/NCS  6) Consults:   -Consider referral to Dr. Ayala pending results of XR/EMG      Orders Placed This Encounter   • XR WRIST 3 OR MORE VIEWS RIGHT   • XR HAND 3 OR MORE VIEWS RIGHT   • DULoxetine (CYMBALTA) 30 MG capsule   • alendronate (FOSAMAX) 70 MG tablet       Return for will call with results.    Instructions provided as documented in the after visit summary.    The above  recommendations were provided to the patient. Diagnosis, treatment options, risks, benefits, and alternatives were discussed, and all questions were answered to the patient's satisfaction. The patient expressed understanding of the diagnosis and plan for management and agreed with the plan of care.      A copy of the consultation note has been sent to the requesting provider Volodymyr Rico MD    Thank you for allowing me to take part in the care of Mary Douglas    If you have any questions, please feel free to contact me.      On 10/2/2023, I, Masha Blackwood PA-C scribed the services personally performed by Negar Garzon MD    The documentation recorded by the scribe accurately and completely reflects the service(s) I personally performed and the decisions made by me.       Negar Garzon MD  Medical Director  Interventional Pain Management  [facility]     ADDENDUM 10/11/23  10/02/23 XR HAND AND WRIST  FINDINGS/ IMPRESSION:  Normal carpal alignment. Degenerative changes at the first carpal-metacarpal joint. Demineralization. Soft tissue swelling about the wrist.  Severe joint space narrowing of the second and third metacarpophalangeal joints. Joint space narrowing throughout the interphalangeal joints. No acute fracture or dislocation.    Please let patient know that hand and wrist x-ray showed severe arthritis, but no acute fracture or dislocation.  For her significant hand/wrist pain I recommend she establish with Dr. Ayala (orthopedics)    CARA LI    ADDENDUM 10/23/23  10/12/23 CERVICAL MRI  FINDINGS: Remote postoperative changes of anterior cervical discectomy and fusion of C5-C7. Additional posterior instrumented fusion of C2-T1 with extensive susceptibility artifact. Reversal of normal cervical lordosis apex at C5. No spondylolisthesis. Alignment of the craniocervical junction and  atlantoaxial joints are preserved. No cerebellar tonsillar ectopia. Small chronic  bilateral cerebellar infarcts. Focal thinning of the cervical cord with intrinsic T2 signal hyperintensity bilaterally at the level of C6  compatible with chronic myelomalacia, unchanged. Paraspinal soft tissues are unremarkable.  Nonoperative levels demonstrate mild spondylosis. Small disc osteophyte complex at C5-6 indents the ventral thecal sac and contacts the cord but does not contribute to substantial spinal canal narrowing. Cervical spinal canal is decompressed posteriorly. Uncovertebral and facet joint  hypertrophic changes contribute to moderate left greater than right foraminal narrowing at T1-2. No substantial foraminal narrowing in the cervical spine.     IMPRESSION:   1.   Remote postoperative changes of ACDF C5-C7 as well as posterior instrumented fusion C2-T1. Similar alignment with reversal of normal cervical lordosis.  2.   Focal chronic myelomalacia in the cervical cord at C6 with mild cord atrophy, similar since MRI 11/20/2014  3.   No substantial foraminal or spinal canal narrowing in the cervical spine.  4.   Small chronic bilateral cerebellar infarcts.    10/12/23 LUMBAR MRI  FINDINGS: 5 nonrib-bearing lumbar-type vertebral bodies. The L5-S1 level will be denoted as on series 20, image 27.  Alignment: Dextrocurvature. Grade 1 retrolisthesis of L1 on L2 and anterolisthesis of L4 on L5.  Vertebrae: Chronic mild L1 and moderate L4 height loss. L3-L5 posterior decompression with metallic fusion. No suspicious osseous lesion. No acute fracture.  Disc Heights: Multilevel degenerative disc height loss ranging up to advanced at L1-L2.  Conus: Normal conus position and signal. No fibrofatty filar thickening.  Other: Right gluteal, iliopsoas and iliacus fatty atrophy. Right hip arthroplasty.  Level-By-Level Findings-  Visualized Lower Thoracic Spine: Disc bulges with resultant multilevel moderate spinal canal stenoses and up to severe neural foraminal stenosis on the left at T10-T11.  L1-L2: Facet  arthropathy. Ligamentum flavum buckling. Disc bulge. Severe spinal canal stenosis. Moderate bilateral neural foraminal stenosis.  L2-L3: Facet arthropathy. Ligamentum flavum buckling. Disc bulge. Severe spinal canal stenosis with cauda equina compression. Moderate bilateral neural foraminal stenosis.  L3-L4: No significant spinal canal or neural foraminal stenosis.  L4-L5: No significant spinal canal or neural foraminal stenosis.  L5-S1: Facet arthropathy. No significant spinal canal or neural foraminal stenosis.  IMPRESSION: Spondylosis results in severe spinal canal as well as moderate bilateral  neural foraminal stenoses at L1-L2 and L2-L3.    Please let patient know that cervical and lumbar MRI showed multilevel degenerative and arthritic changes that results in narrowing.  An imaging of her neck there was cord signal change indicating damage to the cord similar to previous MRI in 2014 and no MRI evidence of hardware failure.  We can certainly trial a T1-2 I LESI; however, incidentally noted was issues in the cerebellum (part of her brain)- these appear chronic in nature.  As such I recommend she established with Neurology prior to interventional consideration for her neck and arm pain.  In her low back there severe narrowing centrally and where the nerve roots exit the spine.  For her low back and left thigh pain I recommend a left L1-2 L2-3 TFESI; she takes no anticoagulants    CARA LI         117.5

## 2023-10-04 ENCOUNTER — APPOINTMENT (OUTPATIENT)
Dept: INTERNAL MEDICINE | Facility: CLINIC | Age: 88
End: 2023-10-04
Payer: MEDICARE

## 2023-10-04 ENCOUNTER — OUTPATIENT (OUTPATIENT)
Dept: OUTPATIENT SERVICES | Facility: HOSPITAL | Age: 88
LOS: 1 days | End: 2023-10-04
Payer: MEDICARE

## 2023-10-04 VITALS
BODY MASS INDEX: 18.68 KG/M2 | DIASTOLIC BLOOD PRESSURE: 70 MMHG | WEIGHT: 119 LBS | HEIGHT: 67 IN | OXYGEN SATURATION: 98 % | SYSTOLIC BLOOD PRESSURE: 120 MMHG | HEART RATE: 65 BPM

## 2023-10-04 DIAGNOSIS — Z98.890 OTHER SPECIFIED POSTPROCEDURAL STATES: Chronic | ICD-10-CM

## 2023-10-04 DIAGNOSIS — I10 ESSENTIAL (PRIMARY) HYPERTENSION: ICD-10-CM

## 2023-10-04 DIAGNOSIS — G93.31 POSTVIRAL FATIGUE SYNDROME: ICD-10-CM

## 2023-10-04 PROCEDURE — G0463: CPT

## 2023-10-04 PROCEDURE — 99214 OFFICE O/P EST MOD 30 MIN: CPT

## 2023-10-10 ENCOUNTER — APPOINTMENT (OUTPATIENT)
Dept: CARE COORDINATION | Facility: HOME HEALTH | Age: 88
End: 2023-10-10

## 2023-10-10 DIAGNOSIS — G93.31 POSTVIRAL FATIGUE SYNDROME: ICD-10-CM

## 2023-10-11 ENCOUNTER — APPOINTMENT (OUTPATIENT)
Dept: INTERNAL MEDICINE | Facility: CLINIC | Age: 88
End: 2023-10-11

## 2023-10-12 NOTE — HISTORY OF PRESENT ILLNESS
[FreeTextEntry8] : \par \par (Spoke with the daughter).\par \par \par Telephonic visit = 11 mins\par \par LOS = 89533.\par \par \par \par Patient: Exposure to COVID-19 virus (about) 5 days ago.\par \par \par Symptoms: cough, possible chills, possible body aches.\par \par \par No fevers.\par \par \par He is vaccinated, Pfizer x 3.\par \par \par  [Family Member] : family member Cheiloplasty (Complex) Text: A decision was made to reconstruct the defect with a  cheiloplasty.  The defect was undermined extensively.  Additional orbicularis oris muscle was excised with a 15 blade scalpel.  The defect was converted into a full thickness wedge to facilite a better cosmetic result.  Small vessels were then tied off with 5-0 monocyrl. The orbicularis oris, superficial fascia, adipose and dermis were then reapproximated.  After the deeper layers were approximated the epidermis was reapproximated with particular care given to realign the vermilion border.

## 2023-10-19 NOTE — PHYSICAL THERAPY INITIAL EVALUATION ADULT - PHYSICAL ASSIST/NONPHYSICAL ASSIST: STAND/SIT, REHAB EVAL
Patient was left a message to call the office.  Inform the patient of the message below   verbal cues

## 2023-10-24 ENCOUNTER — APPOINTMENT (OUTPATIENT)
Dept: INTERNAL MEDICINE | Facility: CLINIC | Age: 88
End: 2023-10-24
Payer: MEDICARE

## 2023-10-24 ENCOUNTER — OUTPATIENT (OUTPATIENT)
Dept: OUTPATIENT SERVICES | Facility: HOSPITAL | Age: 88
LOS: 1 days | End: 2023-10-24
Payer: MEDICARE

## 2023-10-24 VITALS
HEIGHT: 67 IN | DIASTOLIC BLOOD PRESSURE: 80 MMHG | BODY MASS INDEX: 18.83 KG/M2 | OXYGEN SATURATION: 97 % | HEART RATE: 82 BPM | WEIGHT: 120 LBS | SYSTOLIC BLOOD PRESSURE: 130 MMHG

## 2023-10-24 DIAGNOSIS — Z98.890 OTHER SPECIFIED POSTPROCEDURAL STATES: Chronic | ICD-10-CM

## 2023-10-24 DIAGNOSIS — Z13.39 ENCOUNTER FOR SCREENING EXAM FOR OTHER MENTAL HEALTH AND BEHAVIORAL DISORDERS: ICD-10-CM

## 2023-10-24 DIAGNOSIS — I10 ESSENTIAL (PRIMARY) HYPERTENSION: ICD-10-CM

## 2023-10-24 DIAGNOSIS — E55.9 VITAMIN D DEFICIENCY, UNSPECIFIED: ICD-10-CM

## 2023-10-24 DIAGNOSIS — Z01.818 ENCOUNTER FOR OTHER PREPROCEDURAL EXAMINATION: ICD-10-CM

## 2023-10-24 DIAGNOSIS — Z13.31 ENCOUNTER FOR SCREENING FOR DEPRESSION: ICD-10-CM

## 2023-10-24 PROCEDURE — G0439: CPT

## 2023-10-24 RX ORDER — FLUTICASONE PROPIONATE 50 UG/1
50 SPRAY, METERED NASAL TWICE DAILY
Qty: 1 | Refills: 0 | Status: COMPLETED | COMMUNITY
Start: 2023-10-04 | End: 2023-10-24

## 2023-10-24 RX ORDER — CHLORHEXIDINE GLUCONATE 4 %
1000 LIQUID (ML) TOPICAL
Qty: 90 | Refills: 3 | Status: ACTIVE | COMMUNITY
Start: 2023-10-24 | End: 1900-01-01

## 2023-10-24 RX ORDER — IPRATROPIUM BROMIDE 21 UG/1
0.03 SPRAY NASAL 3 TIMES DAILY
Qty: 1 | Refills: 2 | Status: COMPLETED | COMMUNITY
Start: 2023-10-04 | End: 2023-10-24

## 2023-10-24 NOTE — PATIENT PROFILE ADULT. - HEALTHCARE QUESTIONS, PROFILE
Likely viral etiology for cold symptoms.  Usual course discussed.  Tylenol/Motrin as needed for any fever.  Place a humidifier in child's room if desired.  Can sit with child in a steamed up bathroom to help with congestion.  Age-appropriate OTC cough/cold remedies as indicated--discussed.  Call for any acute worsening, other question/concern, new fever, fever that lasts for 5 days, or if cold symptoms not improving after 2 weeks.  Phone number for concerns during office hours or for scheduling appointments or other general non-urgent matters:  [phone]  Phone number for [facility] (for after-hours urgent concerns):  [phone]     Miralax 3/4 capful once daily in juice.  High fiber diet.  Lots of water.    
none

## 2023-10-26 PROBLEM — Z13.39 ALCOHOL SCREENING: Status: RESOLVED | Noted: 2022-01-04 | Resolved: 2023-10-26

## 2023-10-26 PROBLEM — Z13.31 DEPRESSION SCREENING: Status: ACTIVE | Noted: 2022-01-04

## 2023-10-26 PROBLEM — Z01.818 PRE-OP TESTING: Status: RESOLVED | Noted: 2021-04-19 | Resolved: 2023-10-26

## 2023-10-26 LAB
25(OH)D3 SERPL-MCNC: 49.9 NG/ML
ANION GAP SERPL CALC-SCNC: 9 MMOL/L
BUN SERPL-MCNC: 29 MG/DL
CALCIUM SERPL-MCNC: 9.4 MG/DL
CHLORIDE SERPL-SCNC: 97 MMOL/L
CO2 SERPL-SCNC: 26 MMOL/L
CREAT SERPL-MCNC: 1.6 MG/DL
EGFR: 42 ML/MIN/1.73M2
POTASSIUM SERPL-SCNC: 4.6 MMOL/L
SODIUM SERPL-SCNC: 131 MMOL/L
VIT B12 SERPL-MCNC: 1953 PG/ML

## 2023-11-02 DIAGNOSIS — Z00.00 ENCOUNTER FOR GENERAL ADULT MEDICAL EXAMINATION WITHOUT ABNORMAL FINDINGS: ICD-10-CM

## 2023-11-02 DIAGNOSIS — F43.9 REACTION TO SEVERE STRESS, UNSPECIFIED: ICD-10-CM

## 2023-11-02 DIAGNOSIS — E55.9 VITAMIN D DEFICIENCY, UNSPECIFIED: ICD-10-CM

## 2023-11-02 DIAGNOSIS — Z13.31 ENCOUNTER FOR SCREENING FOR DEPRESSION: ICD-10-CM

## 2023-12-12 ENCOUNTER — APPOINTMENT (OUTPATIENT)
Dept: NEPHROLOGY | Facility: CLINIC | Age: 88
End: 2023-12-12
Payer: MEDICARE

## 2023-12-12 VITALS
SYSTOLIC BLOOD PRESSURE: 113 MMHG | DIASTOLIC BLOOD PRESSURE: 73 MMHG | BODY MASS INDEX: 19.43 KG/M2 | HEART RATE: 66 BPM | HEIGHT: 66.5 IN | WEIGHT: 122.35 LBS | TEMPERATURE: 97.3 F | OXYGEN SATURATION: 99 %

## 2023-12-12 PROCEDURE — 99213 OFFICE O/P EST LOW 20 MIN: CPT

## 2023-12-13 ENCOUNTER — APPOINTMENT (OUTPATIENT)
Dept: OTOLARYNGOLOGY | Facility: CLINIC | Age: 88
End: 2023-12-13
Payer: MEDICARE

## 2023-12-13 PROCEDURE — 99214 OFFICE O/P EST MOD 30 MIN: CPT

## 2023-12-13 NOTE — REASON FOR VISIT
[FreeTextEntry2] : s/p 5/6/21 left partial glossectomy, primary closure, L MRND, left parotidectomy for benign cyst,

## 2023-12-13 NOTE — HISTORY OF PRESENT ILLNESS
[None] : No associated symptoms are reported. [de-identified] : Mr. 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  Reports feeling well. Denies noticing any new oral lesions

## 2023-12-13 NOTE — CONSULT LETTER
[FreeTextEntry2] : Hamilton Ogden MD ([place], [place]) [FreeTextEntry3] : Bhupendra Buckner MD, FACS  [facility] Associate Chair Department of Otolaryngology Professor Otolaryngology & Molecular Medicine [facility]

## 2024-01-19 ENCOUNTER — NON-APPOINTMENT (OUTPATIENT)
Age: 89
End: 2024-01-19

## 2024-01-19 ENCOUNTER — APPOINTMENT (OUTPATIENT)
Dept: INTERNAL MEDICINE | Facility: CLINIC | Age: 89
End: 2024-01-19
Payer: MEDICARE

## 2024-01-19 VITALS — DIASTOLIC BLOOD PRESSURE: 78 MMHG | SYSTOLIC BLOOD PRESSURE: 136 MMHG

## 2024-01-19 VITALS
DIASTOLIC BLOOD PRESSURE: 83 MMHG | SYSTOLIC BLOOD PRESSURE: 146 MMHG | BODY MASS INDEX: 19.06 KG/M2 | HEART RATE: 67 BPM | HEIGHT: 66.5 IN | OXYGEN SATURATION: 98 % | WEIGHT: 120 LBS | TEMPERATURE: 98.2 F

## 2024-01-19 DIAGNOSIS — R14.0 ABDOMINAL DISTENSION (GASEOUS): ICD-10-CM

## 2024-01-19 DIAGNOSIS — Z85.89 PERSONAL HISTORY OF MALIGNANT NEOPLASM OF OTHER ORGANS AND SYSTEMS: ICD-10-CM

## 2024-01-19 DIAGNOSIS — J44.9 CHRONIC OBSTRUCTIVE PULMONARY DISEASE, UNSPECIFIED: ICD-10-CM

## 2024-01-19 DIAGNOSIS — K14.8 OTHER DISEASES OF TONGUE: ICD-10-CM

## 2024-01-19 DIAGNOSIS — M81.0 AGE-RELATED OSTEOPOROSIS W/OUT CURRENT PATHOLOGICAL FRACTURE: ICD-10-CM

## 2024-01-19 DIAGNOSIS — R35.1 BENIGN PROSTATIC HYPERPLASIA WITH LOWER URINARY TRACT SYMPMS: ICD-10-CM

## 2024-01-19 DIAGNOSIS — N40.1 BENIGN PROSTATIC HYPERPLASIA WITH LOWER URINARY TRACT SYMPMS: ICD-10-CM

## 2024-01-19 DIAGNOSIS — Z87.2 PERSONAL HISTORY OF DISEASES OF THE SKIN AND SUBCUTANEOUS TISSUE: ICD-10-CM

## 2024-01-19 DIAGNOSIS — Z87.898 PERSONAL HISTORY OF OTHER SPECIFIED CONDITIONS: ICD-10-CM

## 2024-01-19 DIAGNOSIS — R93.429 ABNORMAL RADIOLOGIC FINDINGS ON DIAGNOSITIC IMAGING OF UNSPECIFIED KIDNEY: ICD-10-CM

## 2024-01-19 DIAGNOSIS — Z85.819 PERSONAL HISTORY OF MALIGNANT NEOPLASM OF UNSPECIFIED SITE OF LIP, ORAL CAVITY, AND PHARYNX: ICD-10-CM

## 2024-01-19 DIAGNOSIS — K11.8 OTHER DISEASES OF SALIVARY GLANDS: ICD-10-CM

## 2024-01-19 DIAGNOSIS — H26.9 UNSPECIFIED CATARACT: ICD-10-CM

## 2024-01-19 DIAGNOSIS — L02.11 CELLULITIS OF NECK: ICD-10-CM

## 2024-01-19 DIAGNOSIS — H61.22 IMPACTED CERUMEN, LEFT EAR: ICD-10-CM

## 2024-01-19 DIAGNOSIS — Z23 ENCOUNTER FOR IMMUNIZATION: ICD-10-CM

## 2024-01-19 DIAGNOSIS — Z85.528 PERSONAL HISTORY OF OTHER MALIGNANT NEOPLASM OF KIDNEY: ICD-10-CM

## 2024-01-19 DIAGNOSIS — U07.1 COVID-19: ICD-10-CM

## 2024-01-19 DIAGNOSIS — L03.221 CELLULITIS OF NECK: ICD-10-CM

## 2024-01-19 DIAGNOSIS — T14.8XXA OTHER INJURY OF UNSPECIFIED BODY REGION, INITIAL ENCOUNTER: ICD-10-CM

## 2024-01-19 DIAGNOSIS — F43.9 REACTION TO SEVERE STRESS, UNSPECIFIED: ICD-10-CM

## 2024-01-19 DIAGNOSIS — Z20.822 CONTACT WITH AND (SUSPECTED) EXPOSURE TO COVID-19: ICD-10-CM

## 2024-01-19 PROCEDURE — G2211 COMPLEX E/M VISIT ADD ON: CPT

## 2024-01-19 PROCEDURE — 99214 OFFICE O/P EST MOD 30 MIN: CPT

## 2024-01-19 NOTE — PHYSICAL EXAM
[No Acute Distress] : no acute distress [Well Nourished] : well nourished [Well Developed] : well developed [Supple] : supple [No Respiratory Distress] : no respiratory distress  [Clear to Auscultation] : lungs were clear to auscultation bilaterally [Normal Rate] : normal rate  [Regular Rhythm] : with a regular rhythm [Normal S1, S2] : normal S1 and S2 [No Edema] : there was no peripheral edema [Soft] : abdomen soft [Non Tender] : non-tender [Normal Bowel Sounds] : normal bowel sounds [Normal Posterior Cervical Nodes] : no posterior cervical lymphadenopathy [Normal Anterior Cervical Nodes] : no anterior cervical lymphadenopathy [No CVA Tenderness] : no CVA  tenderness [No Spinal Tenderness] : no spinal tenderness [Normal Gait] : normal gait [Speech Grossly Normal] : speech grossly normal [Normal Affect] : the affect was normal [Alert and Oriented x3] : oriented to person, place, and time [de-identified] : Elderly male,

## 2024-01-19 NOTE — HISTORY OF PRESENT ILLNESS
[Other: _____] : [unfilled] [FreeTextEntry8] : Patient presented for the first time for transition of care, he's looking for a new PCP.   His PCP moved away to a different location.  Last PE was in 10/2023.  Pt is on Levothyroxine and running out of meds so needs a refill, labs done a few months ago is stable.  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  He is doing better and sees ENT 1-2x per year.  He lost all of his upper teeth due to the surgery and is on soft diet. only.  Pt also sees urologist as he had nephrectomy for renal cell CA, the remaining kidney is functioning at about 30% only, and he also has hyperkalemia and thus takes Lokelma.  He is followed by nephrologist for CKD 2-3x per year.  He also takes Flomax for BPH with LUTS.  He was told he has emphysema, but is not on meds and denied GUERRERO.  He never had pulmonary eval.  He has occ URI that tends to settle inthe lower airway at least 1-2 x per year, and needs ABx frequently.

## 2024-03-08 ENCOUNTER — NON-APPOINTMENT (OUTPATIENT)
Age: 89
End: 2024-03-08

## 2024-03-08 ENCOUNTER — APPOINTMENT (OUTPATIENT)
Dept: OPHTHALMOLOGY | Facility: CLINIC | Age: 89
End: 2024-03-08
Payer: MEDICARE

## 2024-03-08 PROCEDURE — 92012 INTRM OPH EXAM EST PATIENT: CPT | Mod: 25

## 2024-03-08 PROCEDURE — 76514 ECHO EXAM OF EYE THICKNESS: CPT

## 2024-03-08 PROCEDURE — 92083 EXTENDED VISUAL FIELD XM: CPT

## 2024-03-13 ENCOUNTER — APPOINTMENT (OUTPATIENT)
Dept: OPHTHALMOLOGY | Facility: CLINIC | Age: 89
End: 2024-03-13

## 2024-04-02 ENCOUNTER — TRANSCRIPTION ENCOUNTER (OUTPATIENT)
Age: 89
End: 2024-04-02

## 2024-04-02 LAB
ALBUMIN SERPL ELPH-MCNC: 4.3 G/DL
ALP BLD-CCNC: 59 U/L
ALT SERPL-CCNC: 14 U/L
ANION GAP SERPL CALC-SCNC: 10 MMOL/L
AST SERPL-CCNC: 26 U/L
BASOPHILS # BLD AUTO: 0.04 K/UL
BASOPHILS NFR BLD AUTO: 0.7 %
BILIRUB SERPL-MCNC: 0.5 MG/DL
BUN SERPL-MCNC: 25 MG/DL
CALCIUM SERPL-MCNC: 9.6 MG/DL
CALCIUM SERPL-MCNC: 9.6 MG/DL
CHLORIDE SERPL-SCNC: 105 MMOL/L
CO2 SERPL-SCNC: 24 MMOL/L
CREAT SERPL-MCNC: 1.84 MG/DL
EGFR: 35 ML/MIN/1.73M2
EOSINOPHIL # BLD AUTO: 0.41 K/UL
EOSINOPHIL NFR BLD AUTO: 7.5 %
GLUCOSE SERPL-MCNC: 91 MG/DL
HCT VFR BLD CALC: 42.9 %
HGB BLD-MCNC: 13.8 G/DL
IMM GRANULOCYTES NFR BLD AUTO: 0 %
LYMPHOCYTES # BLD AUTO: 1.49 K/UL
LYMPHOCYTES NFR BLD AUTO: 27.3 %
MAN DIFF?: NORMAL
MCHC RBC-ENTMCNC: 30.1 PG
MCHC RBC-ENTMCNC: 32.2 GM/DL
MCV RBC AUTO: 93.7 FL
MONOCYTES # BLD AUTO: 0.61 K/UL
MONOCYTES NFR BLD AUTO: 11.2 %
NEUTROPHILS # BLD AUTO: 2.91 K/UL
NEUTROPHILS NFR BLD AUTO: 53.3 %
PARATHYROID HORMONE INTACT: 23 PG/ML
PHOSPHATE SERPL-MCNC: 3.5 MG/DL
PLATELET # BLD AUTO: 214 K/UL
POTASSIUM SERPL-SCNC: 6.3 MMOL/L
PROT SERPL-MCNC: 6.8 G/DL
RBC # BLD: 4.58 M/UL
RBC # FLD: 13.8 %
SODIUM SERPL-SCNC: 139 MMOL/L
WBC # FLD AUTO: 5.46 K/UL

## 2024-04-05 ENCOUNTER — NON-APPOINTMENT (OUTPATIENT)
Age: 89
End: 2024-04-05

## 2024-04-05 LAB
ALBUMIN SERPL ELPH-MCNC: 4.4 G/DL
ANION GAP SERPL CALC-SCNC: 11 MMOL/L
BUN SERPL-MCNC: 21 MG/DL
CALCIUM SERPL-MCNC: 9.4 MG/DL
CHLORIDE SERPL-SCNC: 101 MMOL/L
CO2 SERPL-SCNC: 23 MMOL/L
CREAT SERPL-MCNC: 1.77 MG/DL
EGFR: 37 ML/MIN/1.73M2
GLUCOSE SERPL-MCNC: 85 MG/DL
PHOSPHATE SERPL-MCNC: 3.5 MG/DL
POTASSIUM SERPL-SCNC: 4.8 MMOL/L
SODIUM SERPL-SCNC: 135 MMOL/L

## 2024-04-05 RX ORDER — SODIUM ZIRCONIUM CYCLOSILICATE 10 G/10G
10 POWDER, FOR SUSPENSION ORAL DAILY
Qty: 90 | Refills: 3 | Status: ACTIVE | COMMUNITY
Start: 2022-08-11 | End: 1900-01-01

## 2024-04-18 ENCOUNTER — NON-APPOINTMENT (OUTPATIENT)
Age: 89
End: 2024-04-18

## 2024-04-21 NOTE — H&P PST ADULT - ANTERIOR CERVICAL L
dad reports fell off couch has lac to back of head appox 1 cm no active bleeding, no LOC   pt awake and alert, acting appropriately for age. VSS. no respiratory distress. cap refill less than 2 sec    no pmh nkda imm utd no meds normal

## 2024-04-22 ENCOUNTER — APPOINTMENT (OUTPATIENT)
Dept: PODIATRY | Facility: CLINIC | Age: 89
End: 2024-04-22
Payer: MEDICARE

## 2024-04-22 PROCEDURE — 28490 TREAT BIG TOE FRACTURE: CPT | Mod: T5

## 2024-04-22 PROCEDURE — 99203 OFFICE O/P NEW LOW 30 MIN: CPT | Mod: 25

## 2024-04-23 ENCOUNTER — APPOINTMENT (OUTPATIENT)
Dept: INTERNAL MEDICINE | Facility: CLINIC | Age: 89
End: 2024-04-23
Payer: MEDICARE

## 2024-04-23 VITALS
SYSTOLIC BLOOD PRESSURE: 118 MMHG | WEIGHT: 118 LBS | HEART RATE: 75 BPM | OXYGEN SATURATION: 99 % | DIASTOLIC BLOOD PRESSURE: 72 MMHG | TEMPERATURE: 96 F | RESPIRATION RATE: 15 BRPM | BODY MASS INDEX: 18.74 KG/M2 | HEIGHT: 66.5 IN

## 2024-04-23 DIAGNOSIS — Z00.00 ENCOUNTER FOR GENERAL ADULT MEDICAL EXAMINATION W/OUT ABNORMAL FINDINGS: ICD-10-CM

## 2024-04-23 DIAGNOSIS — R73.03 PREDIABETES.: ICD-10-CM

## 2024-04-23 DIAGNOSIS — M79.674 PAIN IN RIGHT TOE(S): ICD-10-CM

## 2024-04-23 DIAGNOSIS — S91.111A LACERATION W/OUT FOREIGN BODY OF RIGHT GREAT TOE W/OUT DAMAGE TO NAIL, INITIAL ENCOUNTER: ICD-10-CM

## 2024-04-23 DIAGNOSIS — Z76.89 PERSONS ENCOUNTERING HEALTH SERVICES IN OTHER SPECIFIED CIRCUMSTANCES: ICD-10-CM

## 2024-04-23 DIAGNOSIS — S92.424B: ICD-10-CM

## 2024-04-23 DIAGNOSIS — E03.9 HYPOTHYROIDISM, UNSPECIFIED: ICD-10-CM

## 2024-04-23 DIAGNOSIS — E78.1 PURE HYPERGLYCERIDEMIA: ICD-10-CM

## 2024-04-23 PROCEDURE — 99214 OFFICE O/P EST MOD 30 MIN: CPT

## 2024-04-23 PROCEDURE — G2211 COMPLEX E/M VISIT ADD ON: CPT

## 2024-04-23 NOTE — ASSESSMENT
[FreeTextEntry1] : He has apt to return for PE in 10.2024.  Will have routine labs done prior to OV.

## 2024-04-23 NOTE — PHYSICAL EXAM
[No Acute Distress] : no acute distress [Well Nourished] : well nourished [Well Developed] : well developed [Supple] : supple [No Respiratory Distress] : no respiratory distress  [Clear to Auscultation] : lungs were clear to auscultation bilaterally [Normal Rate] : normal rate  [Regular Rhythm] : with a regular rhythm [Normal S1, S2] : normal S1 and S2 [No Edema] : there was no peripheral edema [Soft] : abdomen soft [Non Tender] : non-tender [Normal Bowel Sounds] : normal bowel sounds [No CVA Tenderness] : no CVA  tenderness [No Spinal Tenderness] : no spinal tenderness [Grossly Normal Strength/Tone] : grossly normal strength/tone [Speech Grossly Normal] : speech grossly normal [Normal Affect] : the affect was normal [Alert and Oriented x3] : oriented to person, place, and time [de-identified] : Elderly male, [de-identified] : R big toe with pressure dressing on, no surrounding erythema or edema,  [de-identified] : Ambulate with a cane,

## 2024-04-23 NOTE — HISTORY OF PRESENT ILLNESS
[Other: _____] : [unfilled] [FreeTextEntry1] : Pt presented for f/u of hypothyroidism, HLD and glucose intolerance. [de-identified] : Pt is planning a trip to [place] to visit family next week.  He will be there for 2 weeks.  He went to urgent care center recently as he was packing for his trip, and a drawer fell on his R big toe.  Xray showed a very small non-displaced facture of the phalanx.  He f/u with podiatry and was taped for immobilization.  The fracture was at a non-weightbearing bone, so pt was allowed to walk.  He noted minimal pain.  He was started on Doxycycline by urgent care provider and will complete a 7 day course.  Pt will f/u with podiatrist in a week before his trip.  He feels well otherwise and has not been sick since last OV.  He saw nephrologist recetnly and labs done a few weeks ago noted elevated K, his dose of Lokelma was increase for a few days.  Repeat K was ok, so he went back to usual dose of Lokelma.

## 2024-04-24 LAB
ANION GAP SERPL CALC-SCNC: 13 MMOL/L
BUN SERPL-MCNC: 27 MG/DL
CALCIUM SERPL-MCNC: 9.5 MG/DL
CHLORIDE SERPL-SCNC: 100 MMOL/L
CHOLEST SERPL-MCNC: 142 MG/DL
CO2 SERPL-SCNC: 20 MMOL/L
CREAT SERPL-MCNC: 1.68 MG/DL
EGFR: 39 ML/MIN/1.73M2
ESTIMATED AVERAGE GLUCOSE: 123 MG/DL
GLUCOSE SERPL-MCNC: 142 MG/DL
HBA1C MFR BLD HPLC: 5.9 %
HDLC SERPL-MCNC: 67 MG/DL
LDLC SERPL CALC-MCNC: 55 MG/DL
NONHDLC SERPL-MCNC: 75 MG/DL
POTASSIUM SERPL-SCNC: 5.4 MMOL/L
SODIUM SERPL-SCNC: 133 MMOL/L
T4 FREE SERPL-MCNC: 1.5 NG/DL
TRIGL SERPL-MCNC: 115 MG/DL
TSH SERPL-ACNC: 10.3 UIU/ML

## 2024-04-24 NOTE — ASSESSMENT
[FreeTextEntry1] : Impression: Acute fracture right distal phalanx. Superficial laceration. Pain.  Treatment: He will continue antibiotics as prescribed. I discussed fracture care. I put a Neosporin dressing on with Xeroform and immobilizing dressing. He will continue his open shoe to keep pressure off of it. Walk with an apropulsive gait. He will leave my bandage on for several days and I gave Xeroform that they could do wound care with Bacitracin. I will see him back in a week and we will make a determination as to treatment going forward.

## 2024-04-24 NOTE — PHYSICAL EXAM
[1+] : left foot posterior tibialis 1+ [2+] : left foot dorsalis pedis 2+ [Vibration Dec.] : diminished vibratory sensation at the level of the toes [Diminished Throughout Right Foot] : diminished sensation with monofilament testing throughout right foot [Diminished Throughout Left Foot] : diminished sensation with monofilament testing throughout left foot [Delayed in the Right Toes] : capillary refills normal in right toes [Delayed in the Left Toes] : capillary refills normal in the left toes [de-identified] : The extensor and flexor tendon are intact. The toe is stable. It is not infected. There is sensitivity at the right great toe.  [FreeTextEntry1] : He has a small superficial laceration at the medial distal great toe, just medial to the nail. It is 1/2cm. It is superficial and almost healed in. There is no erythema, drainage or infection noted. There is no subungual hematoma.

## 2024-04-24 NOTE — PROCEDURE
[FreeTextEntry1] : X-ray Report: (Reviewed - right foot) I independently reviewed the x-rays that he presents with, demonstrating the distal tuff with a transverse lucency easily appreciated on the lateral, consistent with nondisplaced fracture, non-intra-articular.

## 2024-04-24 NOTE — HISTORY OF PRESENT ILLNESS
[FreeTextEntry1] : Patient presents today. He injured his right great toe when he dropped something heavy out of his closet on 4/18/24. He went to [facility] and was diagnosed with a fracture. He had a little bit of an open wound. He does have a history of renal disease. He is taking Doxycycline without problem and presents today for evaluation.  He is in a modified shoe and presents today with daughter for evaluation.  Patient has renal disease.

## 2024-04-26 ENCOUNTER — APPOINTMENT (OUTPATIENT)
Dept: PODIATRY | Facility: CLINIC | Age: 89
End: 2024-04-26
Payer: MEDICARE

## 2024-04-26 DIAGNOSIS — L97.509 NON-PRESSURE CHRONIC ULCER OF OTHER PART OF UNSPECIFIED FOOT WITH UNSPECIFIED SEVERITY: ICD-10-CM

## 2024-04-26 PROCEDURE — 99212 OFFICE O/P EST SF 10 MIN: CPT | Mod: 24

## 2024-04-26 RX ORDER — MUPIROCIN 20 MG/G
2 OINTMENT TOPICAL
Qty: 1 | Refills: 1 | Status: ACTIVE | COMMUNITY
Start: 2024-04-26 | End: 1900-01-01

## 2024-04-26 NOTE — REASON FOR VISIT
[Follow-Up Visit] : a follow-up visit for [Confirmed Foot Fracture] : confirmed foot fracture [Foot Pain] : foot pain

## 2024-04-30 PROBLEM — L97.509 NON-PRESSURE ULCER OF TOE: Status: ACTIVE | Noted: 2024-04-29

## 2024-04-30 NOTE — HISTORY OF PRESENT ILLNESS
[FreeTextEntry1] : Patient presents today for evaluation of right hallux wound and fracture. He has been doing wound care without problem. He presents today with his daughter.

## 2024-04-30 NOTE — ASSESSMENT
[FreeTextEntry1] : Impression: Non-pressure ulcer right great toe.   Treatment: I put an antibiotic Xeroform gauze dressing on. I wrote for Mupirocin. I want him to continue to use that for another week or 2 to be on the safe side. He will continue his open toe shoe and mild activity for the next 10 days and then may gradually increase activity thereafter. Call the office with any issues whatsoever.

## 2024-04-30 NOTE — PHYSICAL EXAM
[1+] : left foot posterior tibialis 1+ [2+] : left foot dorsalis pedis 2+ [Vibration Dec.] : diminished vibratory sensation at the level of the toes [Diminished Throughout Right Foot] : diminished sensation with monofilament testing throughout right foot [Diminished Throughout Left Foot] : diminished sensation with monofilament testing throughout left foot [Delayed in the Right Toes] : capillary refills normal in right toes [Delayed in the Left Toes] : capillary refills normal in the left toes [de-identified] : The fracture appears to be healing in well.  [FreeTextEntry1] : On evaluation, the wound has healed in. There is no erythema. It is healed in nicely. There is definitely no signs of infection. There is minimal swelling.

## 2024-06-10 LAB
25(OH)D3 SERPL-MCNC: 44.5 NG/ML
ALBUMIN SERPL ELPH-MCNC: 4.1 G/DL
ANION GAP SERPL CALC-SCNC: 10 MMOL/L
APPEARANCE: CLEAR
BACTERIA: NEGATIVE /HPF
BILIRUBIN URINE: NEGATIVE
BLOOD URINE: NEGATIVE
BUN SERPL-MCNC: 28 MG/DL
CALCIUM SERPL-MCNC: 9.3 MG/DL
CALCIUM SERPL-MCNC: 9.3 MG/DL
CAST: 0 /LPF
CHLORIDE SERPL-SCNC: 98 MMOL/L
CO2 SERPL-SCNC: 23 MMOL/L
COLOR: YELLOW
CREAT SERPL-MCNC: 1.74 MG/DL
CREAT SPEC-SCNC: 89 MG/DL
EGFR: 38 ML/MIN/1.73M2
EPITHELIAL CELLS: 0 /HPF
FERRITIN SERPL-MCNC: 84 NG/ML
FOLATE SERPL-MCNC: 10.3 NG/ML
GLUCOSE QUALITATIVE U: NEGATIVE MG/DL
GLUCOSE SERPL-MCNC: 95 MG/DL
HCT VFR BLD CALC: 41.4 %
HGB BLD-MCNC: 13.6 G/DL
IRON SATN MFR SERPL: 21 %
IRON SERPL-MCNC: 65 UG/DL
KETONES URINE: NEGATIVE MG/DL
LEUKOCYTE ESTERASE URINE: ABNORMAL
MAGNESIUM SERPL-MCNC: 2.2 MG/DL
MCHC RBC-ENTMCNC: 29.6 PG
MCHC RBC-ENTMCNC: 32.9 GM/DL
MCV RBC AUTO: 90.2 FL
MICROALBUMIN 24H UR DL<=1MG/L-MCNC: <1.2 MG/DL
MICROALBUMIN/CREAT 24H UR-RTO: NORMAL MG/G
MICROSCOPIC-UA: NORMAL
NITRITE URINE: NEGATIVE
PARATHYROID HORMONE INTACT: 22 PG/ML
PH URINE: 6
PHOSPHATE SERPL-MCNC: 3.7 MG/DL
PLATELET # BLD AUTO: 209 K/UL
POTASSIUM SERPL-SCNC: 5.4 MMOL/L
PROTEIN URINE: NEGATIVE MG/DL
RBC # BLD: 4.59 M/UL
RBC # FLD: 13.7 %
RED BLOOD CELLS URINE: 1 /HPF
SODIUM SERPL-SCNC: 131 MMOL/L
SPECIFIC GRAVITY URINE: 1.01
TIBC SERPL-MCNC: 314 UG/DL
UIBC SERPL-MCNC: 248 UG/DL
URATE SERPL-MCNC: 6.2 MG/DL
UROBILINOGEN URINE: 0.2 MG/DL
VIT B12 SERPL-MCNC: 1174 PG/ML
WBC # FLD AUTO: 4.74 K/UL
WHITE BLOOD CELLS URINE: 1 /HPF

## 2024-06-11 ENCOUNTER — APPOINTMENT (OUTPATIENT)
Dept: NEPHROLOGY | Facility: CLINIC | Age: 89
End: 2024-06-11
Payer: MEDICARE

## 2024-06-11 VITALS
TEMPERATURE: 97.2 F | WEIGHT: 121.25 LBS | HEART RATE: 74 BPM | SYSTOLIC BLOOD PRESSURE: 111 MMHG | HEIGHT: 66.5 IN | BODY MASS INDEX: 19.26 KG/M2 | DIASTOLIC BLOOD PRESSURE: 67 MMHG | OXYGEN SATURATION: 97 %

## 2024-06-11 DIAGNOSIS — E87.5 HYPERKALEMIA: ICD-10-CM

## 2024-06-11 DIAGNOSIS — N18.32 CHRONIC KIDNEY DISEASE, STAGE 3B: ICD-10-CM

## 2024-06-11 PROCEDURE — G2211 COMPLEX E/M VISIT ADD ON: CPT

## 2024-06-11 PROCEDURE — 99213 OFFICE O/P EST LOW 20 MIN: CPT

## 2024-06-11 NOTE — ASSESSMENT
[FreeTextEntry1] : 89 yo male with renal cell s/p nephrectomy, oral cancer CKD3B and hyperkalemia.  CKD3B relatively stable.  Hyperkalemia from CKD and higher K intake bc of his vegetarian diet. Improved on Lokelma  Continue lokelma Monday Wednesday Friday add Sunday  Also understand the importance of nutrition and wt maintenance.  Maintain adequate hydration as well.   All questions were answered Followup in 6 months.  Daughter present for exam and understands plan as well.

## 2024-06-11 NOTE — HISTORY OF PRESENT ILLNESS
[FreeTextEntry1] : 88 year-old male here to followup CKD stage IIIb and hyperkalemia. Patient has a history of renal cell cancer status post nephrectomy.  Also has oral cancer status post partial glossectomy and left neck dissection. 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  His creatinine ranges from 1.7-2.2 range. He has BPH on Flomax. He had hyperkalemia. Taking lokelma every other day.  Was in [facility] for 2 weeks visiting his brother turned 91 yo- took more lokelma when he was [place] He is accompanied by his daughter Polina.  He is taking care of his wife with dementia which is worsening.

## 2024-06-11 NOTE — PHYSICAL EXAM
[General Appearance - Alert] : alert [General Appearance - In No Acute Distress] : in no acute distress [FreeTextEntry1] : thin male [Sclera] : the sclera and conjunctiva were normal [Outer Ear] : the ears and nose were normal in appearance [Neck Appearance] : the appearance of the neck was normal [Auscultation Breath Sounds / Voice Sounds] : lungs were clear to auscultation bilaterally [Heart Rate And Rhythm] : heart rate was normal and rhythm regular [Heart Sounds] : normal S1 and S2 [Murmurs] : no murmurs [Heart Sounds Pericardial Friction Rub] : no pericardial rub [Edema] : there was no peripheral edema [Bowel Sounds] : normal bowel sounds [Involuntary Movements] : no involuntary movements were seen [] : no rash [No Focal Deficits] : no focal deficits [Oriented To Time, Place, And Person] : oriented to person, place, and time [Affect] : the affect was normal [Mood] : the mood was normal

## 2024-06-12 ENCOUNTER — APPOINTMENT (OUTPATIENT)
Dept: OTOLARYNGOLOGY | Facility: CLINIC | Age: 89
End: 2024-06-12
Payer: MEDICARE

## 2024-06-12 VITALS
OXYGEN SATURATION: 96 % | SYSTOLIC BLOOD PRESSURE: 128 MMHG | DIASTOLIC BLOOD PRESSURE: 77 MMHG | BODY MASS INDEX: 19.22 KG/M2 | WEIGHT: 121 LBS | HEIGHT: 66.5 IN | HEART RATE: 65 BPM

## 2024-06-12 DIAGNOSIS — D00.07 CARCINOMA IN SITU OF TONGUE: ICD-10-CM

## 2024-06-12 DIAGNOSIS — L29.9 PRURITUS, UNSPECIFIED: ICD-10-CM

## 2024-06-12 DIAGNOSIS — C02.9 MALIGNANT NEOPLASM OF TONGUE, UNSPECIFIED: ICD-10-CM

## 2024-06-12 PROCEDURE — 99214 OFFICE O/P EST MOD 30 MIN: CPT

## 2024-06-12 RX ORDER — FLUOCINOLONE ACETONIDE 0.11 MG/ML
0.01 OIL AURICULAR (OTIC) DAILY
Qty: 1 | Refills: 0 | Status: ACTIVE | COMMUNITY
Start: 2024-06-12 | End: 1900-01-01

## 2024-06-12 NOTE — CONSULT LETTER
[Dear  ___] : Dear  [unfilled], [Courtesy Letter:] : I had the pleasure of seeing your patient, [unfilled], in my office today. [Please see my note below.] : Please see my note below. [Sincerely,] : Sincerely, [FreeTextEntry2] : Hamilton Ogden MD ([place], [place]) [FreeTextEntry3] : Bhupendra Buckner MD, FACS     [facility] Associate Chair    Department of Otolaryngology  Professor Otolaryngology & Molecular Medicine [facility]

## 2024-06-12 NOTE — HISTORY OF PRESENT ILLNESS
[de-identified] :  Mr. Nath presents today for his 6 month follow up.  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 Denies any issues since last visit.

## 2024-08-06 ENCOUNTER — APPOINTMENT (OUTPATIENT)
Dept: INTERNAL MEDICINE | Facility: CLINIC | Age: 89
End: 2024-08-06

## 2024-08-06 PROBLEM — Z11.9 SCREENING EXAMINATION FOR INFECTIOUS DISEASE: Status: RESOLVED | Noted: 2021-04-16 | Resolved: 2024-08-06

## 2024-08-06 PROBLEM — R53.83 FATIGUE: Status: ACTIVE | Noted: 2023-06-07

## 2024-08-06 PROBLEM — H93.13 BILATERAL TINNITUS: Status: RESOLVED | Noted: 2022-01-10 | Resolved: 2024-08-06

## 2024-08-06 PROBLEM — R07.81 RIB PAIN: Status: RESOLVED | Noted: 2022-03-08 | Resolved: 2024-08-06

## 2024-08-06 PROBLEM — J44.9 CHRONIC OBSTRUCTIVE PULMONARY DISEASE (COPD): Status: ACTIVE | Noted: 2021-04-26

## 2024-08-06 PROCEDURE — G2211 COMPLEX E/M VISIT ADD ON: CPT

## 2024-08-06 PROCEDURE — 99214 OFFICE O/P EST MOD 30 MIN: CPT

## 2024-08-06 NOTE — HISTORY OF PRESENT ILLNESS
[Other: _____] : [unfilled] [FreeTextEntry1] : Pt presented for feeling weak and tired for 3-4 weeks. [de-identified] : He feels that it was worse 1-2 weeks ago but a little better now.  He has not been sick, denied F/S/C, URI symptoms, N/V/D, No change of appetite or sleep.  Pt c/o SOB with the hot weather and humidity.

## 2024-08-06 NOTE — PHYSICAL EXAM
[No Acute Distress] : no acute distress [Well Nourished] : well nourished [Well Developed] : well developed [Supple] : supple [No Respiratory Distress] : no respiratory distress  [Clear to Auscultation] : lungs were clear to auscultation bilaterally [Normal Rate] : normal rate  [Regular Rhythm] : with a regular rhythm [Normal S1, S2] : normal S1 and S2 [No Edema] : there was no peripheral edema [Soft] : abdomen soft [Non Tender] : non-tender [Normal Bowel Sounds] : normal bowel sounds [No CVA Tenderness] : no CVA  tenderness [No Spinal Tenderness] : no spinal tenderness [No Joint Swelling] : no joint swelling [Grossly Normal Strength/Tone] : grossly normal strength/tone [Normal Gait] : normal gait [Speech Grossly Normal] : speech grossly normal [Normal Affect] : the affect was normal [Alert and Oriented x3] : oriented to person, place, and time [de-identified] : Elderly male,

## 2024-08-06 NOTE — HISTORY OF PRESENT ILLNESS
[Other: _____] : [unfilled] [FreeTextEntry1] : Pt presented for feeling weak and tired for 3-4 weeks. [de-identified] : He feels that it was worse 1-2 weeks ago but a little better now.  He has not been sick, denied F/S/C, URI symptoms, N/V/D, No change of appetite or sleep.  Pt c/o SOB with the hot weather and humidity.

## 2024-08-06 NOTE — PHYSICAL EXAM
[No Acute Distress] : no acute distress [Well Nourished] : well nourished [Well Developed] : well developed [Supple] : supple [No Respiratory Distress] : no respiratory distress  [Clear to Auscultation] : lungs were clear to auscultation bilaterally [Normal Rate] : normal rate  [Regular Rhythm] : with a regular rhythm [Normal S1, S2] : normal S1 and S2 [No Edema] : there was no peripheral edema [Soft] : abdomen soft [Non Tender] : non-tender [Normal Bowel Sounds] : normal bowel sounds [No CVA Tenderness] : no CVA  tenderness [No Spinal Tenderness] : no spinal tenderness [No Joint Swelling] : no joint swelling [Grossly Normal Strength/Tone] : grossly normal strength/tone [Normal Gait] : normal gait [Speech Grossly Normal] : speech grossly normal [Normal Affect] : the affect was normal [Alert and Oriented x3] : oriented to person, place, and time [de-identified] : Elderly male,

## 2024-08-20 NOTE — PHYSICAL EXAM
[Normal] : orientation to person, place, and time: normal [de-identified] : well healed left neck scar, flat, dry, intact, no edema, no fluctuance, no discharge [de-identified] : well-healed glossectomy site [de-identified] : reduced horizontal abduction left compared to right, able to touch hand to opposite ear, left shoulder strength slightly weaker compared to right [de-identified] : m Holding RN to OR RN

## 2024-09-06 ENCOUNTER — APPOINTMENT (OUTPATIENT)
Dept: PULMONOLOGY | Facility: CLINIC | Age: 89
End: 2024-09-06

## 2024-09-09 ENCOUNTER — APPOINTMENT (OUTPATIENT)
Dept: UROLOGY | Facility: CLINIC | Age: 89
End: 2024-09-09
Payer: MEDICARE

## 2024-09-09 DIAGNOSIS — R35.1 BENIGN PROSTATIC HYPERPLASIA WITH LOWER URINARY TRACT SYMPMS: ICD-10-CM

## 2024-09-09 DIAGNOSIS — N40.1 BENIGN PROSTATIC HYPERPLASIA WITH LOWER URINARY TRACT SYMPMS: ICD-10-CM

## 2024-09-09 PROCEDURE — G2211 COMPLEX E/M VISIT ADD ON: CPT | Mod: NC

## 2024-09-09 PROCEDURE — 51798 US URINE CAPACITY MEASURE: CPT

## 2024-09-09 PROCEDURE — 99214 OFFICE O/P EST MOD 30 MIN: CPT | Mod: 25

## 2024-09-09 NOTE — ADDENDUM
[FreeTextEntry1] : Entered by Warren Stovall, acting as scribe for Dr. Forrest Rodriguez. The documentation recorded by the scribe accurately reflects the service I personally performed and the decisions made by me.

## 2024-09-09 NOTE — LETTER BODY
[FreeTextEntry1] : Karen Song MD [address] [place], [place] [id number] [phone]  Dear Dr. Song,  Reason for Visit: Previous right renal cell carcinoma. BPH.     This is a 87 year-old gentleman with chronic BPH, and previous renal cell carcinoma status post right radical nephrectomy 11 years ago. 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 His ultrasound and chest x-ray in 2017 revealed no recurrence of malignancy. Patient returns for follow-up. He reports he continues to take Flomax regularly without any side effects or difficulties. He reports his nocturia is getting worse despite medical therapy. Patient has a stable creatinine but reports of hyperkalemia. He has a Lokelma packet. He denies any hematuria or dysuria. His wife has progressive dementia for past 9 years. This impacts his sleep. There is seeking options to improve his sleep as he cares for his wife at nighttime. The past medical history, family history and social history are unchanged. All other review of systems are negative. Patient denies any changes in medications. Medication list was reconciled. He is planning a cruise trip soon.    On examination, the patient is an elderly appearing gentleman in no acute distress. He is alert and oriented follows commands. He has normal mood and affect. He is normocephalic. Neck is supple. Respirations are unlabored. His abdomen is soft and nontender. Bladder is nonpalpable. No CVA tenderness. Neurologically he is grossly intact. No peripheral edema. Skin without gross abnormality.    His last PSA was 3.15. His creatinine was 1.7. Patient has hyperkalemia, Potassium 5.8.    Assessment: Previous renal cell carcinoma. BPH, with nocturia. PSA.  Tongue cancer. Elevated potassium.    I counseled the patient. In terms of his BPH, patient reports worsening nocturia despite Flomax QD. Patient has not met treatment goal. I recommended he increase Flomax to BID.  I renewed the patient's prescription for Flomax BID today. I encouraged him to continue Flomax as needed. In terms of his PSA, his most recent PSA was normal and stable. In terms of his previous kidney cancer, the patient is without evidence of recurrent disease for the last 11 years. He may discontinue renal imaging. In terms of his elevated potassium level, his creatinine is 1.7. Patient has hyperkalemia. He has a Lokelma packet. The patient will follow up as directed and will contact me with any questions or concerns. Thank you for the opportunity to participate in the care of Mr. MENDOZA. I will keep you updated on his progress.  Plan: Increase Flomax to BID. Discontinue renal imaging. Follow-up 1 month.  I spent 30-minutes time today on all issues related to this patient on today date of service including non face to face time.

## 2024-09-09 NOTE — HISTORY OF PRESENT ILLNESS
[FreeTextEntry1] : Follow-up for BPH, on Flomax once daily, report nocturia getting worse Follow-up for PSA, most recent PSA 3.15 in Sept 2023  Patient has not meet treatment goal.  Increase Flomax to twice daily.  Follow-up 1 month.  Patient's previous kidney cancer without evidence of disease for last 11 years.  Discontinue renal imaging.  His creatinine is stable.  However he has hyperkalemia.  He has a Lokelma packet  Please refer to [facility] Consult Note.

## 2024-09-10 LAB
APPEARANCE: CLEAR
BACTERIA: NEGATIVE /HPF
BILIRUBIN URINE: NEGATIVE
BLOOD URINE: NEGATIVE
CAST: 0 /LPF
COLOR: YELLOW
EPITHELIAL CELLS: 0 /HPF
GLUCOSE QUALITATIVE U: NEGATIVE MG/DL
KETONES URINE: NEGATIVE MG/DL
LEUKOCYTE ESTERASE URINE: NEGATIVE
MICROSCOPIC-UA: NORMAL
NITRITE URINE: NEGATIVE
PH URINE: 6
PROTEIN URINE: NEGATIVE MG/DL
RED BLOOD CELLS URINE: 0 /HPF
SPECIFIC GRAVITY URINE: 1.01
UROBILINOGEN URINE: 0.2 MG/DL
WHITE BLOOD CELLS URINE: 0 /HPF

## 2024-09-11 LAB — BACTERIA UR CULT: NORMAL

## 2024-09-18 ENCOUNTER — APPOINTMENT (OUTPATIENT)
Dept: PULMONOLOGY | Facility: CLINIC | Age: 89
End: 2024-09-18
Payer: MEDICARE

## 2024-09-18 VITALS
RESPIRATION RATE: 16 BRPM | SYSTOLIC BLOOD PRESSURE: 131 MMHG | HEART RATE: 69 BPM | OXYGEN SATURATION: 95 % | DIASTOLIC BLOOD PRESSURE: 71 MMHG

## 2024-09-18 DIAGNOSIS — J44.9 CHRONIC OBSTRUCTIVE PULMONARY DISEASE, UNSPECIFIED: ICD-10-CM

## 2024-09-18 DIAGNOSIS — R05.9 COUGH, UNSPECIFIED: ICD-10-CM

## 2024-09-18 DIAGNOSIS — R06.09 OTHER FORMS OF DYSPNEA: ICD-10-CM

## 2024-09-18 PROCEDURE — 94010 BREATHING CAPACITY TEST: CPT

## 2024-09-18 PROCEDURE — 99204 OFFICE O/P NEW MOD 45 MIN: CPT | Mod: 25

## 2024-09-18 PROCEDURE — 71046 X-RAY EXAM CHEST 2 VIEWS: CPT

## 2024-09-18 PROCEDURE — 94727 GAS DIL/WSHOT DETER LNG VOL: CPT

## 2024-09-18 PROCEDURE — ZZZZZ: CPT

## 2024-09-18 PROCEDURE — 94729 DIFFUSING CAPACITY: CPT

## 2024-09-18 RX ORDER — UMECLIDINIUM BROMIDE AND VILANTEROL TRIFENATATE 62.5; 25 UG/1; UG/1
62.5-25 POWDER RESPIRATORY (INHALATION)
Qty: 3 | Refills: 3 | Status: ACTIVE | COMMUNITY
Start: 2024-09-18 | End: 1900-01-01

## 2024-09-18 NOTE — HISTORY OF PRESENT ILLNESS
[Former] : former [TextBox_4] : DUDLEY MENDOZA is a 88 year male with a history of COPD who presents to the office for a consult evaluation from his PCP.  Dudley notes suffering from exertional dyspnea.  Dudley denies chest pain but notes coughing.  Dudley denies taking any medication to manage his COPD.  Dudley is a former smoker, 1 pack a day [TextBox_11] : 1

## 2024-09-18 NOTE — ASSESSMENT
[FreeTextEntry1] : Dr. Harris orderd an x-ray and breathing test to better understand what is afflicting Dudley.  Dr. Harriss assessment: Dudley's x-ray was clear but showed bilateral inflated lungs, consistent with COPD exacerbation. Additionally, Dudley's PFT was also consistent with COPD exacerbation  Dr Harris's recommendation: Start 1 puff once a day of Spiriva. Dudley should let Dr. Harris know if he urinates more frequently. Dudley should return to follow up with Dr. Harris in 1 month so that Dr. Harris can monitor his breathing.  Dr. Harris demonstrated the proper use of spiriva.

## 2024-09-18 NOTE — PROCEDURE
[FreeTextEntry1] : PFT performed today, Sep 18 2024  1:00PM in my office . Spirometry: Moderate obstructive airway disease Lung Volume: Within normal limits Diffusion: Moderate impairment ------------------------- Xray Chest 2 Views PA/Lat             Final  No Documents Attached    	 Chest x-ray PA and lateral views performed in my office today showed bilateral hyperinflated clear lungs, no evidence of infiltrates or pleural effusions.  Ordered by: NELSON HIGGINBOTHAM       Collected/Examined: [id number] 02:00PM       Verification Required       Stage: Final       Performed at: In Office       Performed by: NELSON HIGGINBOTHAM       Resulted: [id number] 02:00PM       Last Updated: [id number] 02:01PM

## 2024-09-18 NOTE — ADDENDUM
[FreeTextEntry1] : I, Conor Ankit, acted solely as a scribe for Dr. Ariana Harris D.O. on this date 09/18/2024.   All medical record entries made by the Scribe were at my, Dr. Ariana Harris D.O., direction and personally dictated by me on 09/18/2024. I have reviewed the chart and agree that the record accurately reflects my personal performance of the history, physical exam, assessment and plan. I have also personally directed, reviewed, and agreed with the chart.

## 2024-09-18 NOTE — PROCEDURE
[FreeTextEntry1] : PFT performed today, Sep 18 2024  1:00PM in my office . Spirometry: Moderate obstructive airway disease Lung Volume: Within normal limits Diffusion: Moderate impairment ------------------------- Xray Chest 2 Views PA/Lat             Final  No Documents Attached    	 Chest x-ray PA and lateral views performed in my office today showed bilateral hyperinflated clear lungs, no evidence of infiltrates or pleural effusions.  Ordered by: NELSON HIGGINBOTHAM       Collected/Examined: [id number] 02:00PM       Verification Required       Stage: Final       Performed at: In Office       Performed by: NELSON HIGGINBOTHAM       Resulted: [id number] 02:00PM       Last Updated: [id number] 02:01PM

## 2024-09-18 NOTE — CONSULT LETTER
[Dear  ___] : Dear  [unfilled], [Consult Letter:] : I had the pleasure of evaluating your patient, [unfilled]. [Please see my note below.] : Please see my note below. [Consult Closing:] : Thank you very much for allowing me to participate in the care of this patient.  If you have any questions, please do not hesitate to contact me. [Sincerely,] : Sincerely, [FreeTextEntry3] : Dr. Harris

## 2024-09-19 PROBLEM — R06.09 EXERTIONAL DYSPNEA: Status: ACTIVE | Noted: 2024-09-19

## 2024-10-17 ENCOUNTER — APPOINTMENT (OUTPATIENT)
Dept: UROLOGY | Facility: CLINIC | Age: 89
End: 2024-10-17
Payer: MEDICARE

## 2024-10-17 PROCEDURE — G2211 COMPLEX E/M VISIT ADD ON: CPT

## 2024-10-17 PROCEDURE — 99214 OFFICE O/P EST MOD 30 MIN: CPT

## 2024-10-18 ENCOUNTER — APPOINTMENT (OUTPATIENT)
Dept: PULMONOLOGY | Facility: CLINIC | Age: 89
End: 2024-10-18
Payer: MEDICARE

## 2024-10-18 VITALS
SYSTOLIC BLOOD PRESSURE: 130 MMHG | WEIGHT: 118 LBS | HEART RATE: 80 BPM | HEIGHT: 67 IN | BODY MASS INDEX: 18.52 KG/M2 | DIASTOLIC BLOOD PRESSURE: 77 MMHG | OXYGEN SATURATION: 95 %

## 2024-10-18 DIAGNOSIS — R05.9 COUGH, UNSPECIFIED: ICD-10-CM

## 2024-10-18 PROCEDURE — 99213 OFFICE O/P EST LOW 20 MIN: CPT | Mod: 25

## 2024-10-18 PROCEDURE — 94010 BREATHING CAPACITY TEST: CPT

## 2024-10-18 RX ORDER — UMECLIDINIUM BROMIDE AND VILANTEROL TRIFENATATE 62.5; 25 UG/1; UG/1
62.5-25 POWDER RESPIRATORY (INHALATION)
Qty: 3 | Refills: 3 | Status: ACTIVE | COMMUNITY
Start: 2024-10-18 | End: 1900-01-01

## 2024-10-25 ENCOUNTER — NON-APPOINTMENT (OUTPATIENT)
Age: 89
End: 2024-10-25

## 2024-10-25 ENCOUNTER — APPOINTMENT (OUTPATIENT)
Dept: INTERNAL MEDICINE | Facility: CLINIC | Age: 89
End: 2024-10-25
Payer: MEDICARE

## 2024-10-25 VITALS
OXYGEN SATURATION: 98 % | BODY MASS INDEX: 18.52 KG/M2 | HEART RATE: 67 BPM | HEIGHT: 67 IN | TEMPERATURE: 97.1 F | WEIGHT: 118 LBS | SYSTOLIC BLOOD PRESSURE: 123 MMHG | DIASTOLIC BLOOD PRESSURE: 72 MMHG

## 2024-10-25 VITALS
OXYGEN SATURATION: 98 % | WEIGHT: 120 LBS | HEIGHT: 67 IN | TEMPERATURE: 97.1 F | BODY MASS INDEX: 18.83 KG/M2 | HEART RATE: 67 BPM | SYSTOLIC BLOOD PRESSURE: 123 MMHG | DIASTOLIC BLOOD PRESSURE: 72 MMHG

## 2024-10-25 DIAGNOSIS — N40.1 BENIGN PROSTATIC HYPERPLASIA WITH LOWER URINARY TRACT SYMPMS: ICD-10-CM

## 2024-10-25 DIAGNOSIS — R35.1 BENIGN PROSTATIC HYPERPLASIA WITH LOWER URINARY TRACT SYMPMS: ICD-10-CM

## 2024-10-25 DIAGNOSIS — M81.0 AGE-RELATED OSTEOPOROSIS W/OUT CURRENT PATHOLOGICAL FRACTURE: ICD-10-CM

## 2024-10-25 DIAGNOSIS — Z23 ENCOUNTER FOR IMMUNIZATION: ICD-10-CM

## 2024-10-25 DIAGNOSIS — R73.03 PREDIABETES.: ICD-10-CM

## 2024-10-25 DIAGNOSIS — Z00.00 ENCOUNTER FOR GENERAL ADULT MEDICAL EXAMINATION W/OUT ABNORMAL FINDINGS: ICD-10-CM

## 2024-10-25 DIAGNOSIS — J44.9 CHRONIC OBSTRUCTIVE PULMONARY DISEASE, UNSPECIFIED: ICD-10-CM

## 2024-10-25 DIAGNOSIS — Z87.891 PERSONAL HISTORY OF NICOTINE DEPENDENCE: ICD-10-CM

## 2024-10-25 DIAGNOSIS — E87.5 HYPERKALEMIA: ICD-10-CM

## 2024-10-25 DIAGNOSIS — E03.9 HYPOTHYROIDISM, UNSPECIFIED: ICD-10-CM

## 2024-10-25 DIAGNOSIS — N18.4 CHRONIC KIDNEY DISEASE, STAGE 4 (SEVERE): ICD-10-CM

## 2024-10-25 PROCEDURE — 93000 ELECTROCARDIOGRAM COMPLETE: CPT

## 2024-10-25 PROCEDURE — 99214 OFFICE O/P EST MOD 30 MIN: CPT | Mod: 25

## 2024-10-25 PROCEDURE — 82270 OCCULT BLOOD FECES: CPT

## 2024-10-25 PROCEDURE — G0439: CPT

## 2024-10-28 ENCOUNTER — APPOINTMENT (OUTPATIENT)
Dept: INTERNAL MEDICINE | Facility: CLINIC | Age: 89
End: 2024-10-28

## 2024-12-06 NOTE — H&P PST ADULT - BLOOD TRANSFUSION, PREVIOUS, PROFILE

## 2024-12-10 ENCOUNTER — APPOINTMENT (OUTPATIENT)
Dept: NEPHROLOGY | Facility: CLINIC | Age: 89
End: 2024-12-10
Payer: MEDICARE

## 2024-12-10 VITALS
DIASTOLIC BLOOD PRESSURE: 76 MMHG | SYSTOLIC BLOOD PRESSURE: 125 MMHG | TEMPERATURE: 98.1 F | BODY MASS INDEX: 18.99 KG/M2 | HEIGHT: 67 IN | HEART RATE: 78 BPM | WEIGHT: 121 LBS | OXYGEN SATURATION: 96 %

## 2024-12-10 DIAGNOSIS — N18.32 CHRONIC KIDNEY DISEASE, STAGE 3B: ICD-10-CM

## 2024-12-10 DIAGNOSIS — N18.4 CHRONIC KIDNEY DISEASE, STAGE 4 (SEVERE): ICD-10-CM

## 2024-12-10 DIAGNOSIS — E87.5 HYPERKALEMIA: ICD-10-CM

## 2024-12-10 PROCEDURE — 99213 OFFICE O/P EST LOW 20 MIN: CPT

## 2024-12-11 ENCOUNTER — APPOINTMENT (OUTPATIENT)
Dept: OTOLARYNGOLOGY | Facility: CLINIC | Age: 89
End: 2024-12-11
Payer: MEDICARE

## 2024-12-11 VITALS
HEIGHT: 67 IN | SYSTOLIC BLOOD PRESSURE: 107 MMHG | DIASTOLIC BLOOD PRESSURE: 56 MMHG | OXYGEN SATURATION: 97 % | BODY MASS INDEX: 18.99 KG/M2 | HEART RATE: 73 BPM | WEIGHT: 121 LBS

## 2024-12-11 DIAGNOSIS — C02.9 MALIGNANT NEOPLASM OF TONGUE, UNSPECIFIED: ICD-10-CM

## 2024-12-11 PROCEDURE — 99214 OFFICE O/P EST MOD 30 MIN: CPT

## 2024-12-17 ENCOUNTER — APPOINTMENT (OUTPATIENT)
Dept: PULMONOLOGY | Facility: CLINIC | Age: 89
End: 2024-12-17
Payer: MEDICARE

## 2024-12-17 VITALS — OXYGEN SATURATION: 94 % | SYSTOLIC BLOOD PRESSURE: 98 MMHG | DIASTOLIC BLOOD PRESSURE: 64 MMHG | HEART RATE: 61 BPM

## 2024-12-17 DIAGNOSIS — R05.9 COUGH, UNSPECIFIED: ICD-10-CM

## 2024-12-17 DIAGNOSIS — J44.9 CHRONIC OBSTRUCTIVE PULMONARY DISEASE, UNSPECIFIED: ICD-10-CM

## 2024-12-17 PROCEDURE — 94010 BREATHING CAPACITY TEST: CPT

## 2024-12-17 PROCEDURE — 99213 OFFICE O/P EST LOW 20 MIN: CPT | Mod: 25

## 2025-03-18 ENCOUNTER — APPOINTMENT (OUTPATIENT)
Dept: PULMONOLOGY | Facility: CLINIC | Age: 89
End: 2025-03-18

## 2025-03-21 ENCOUNTER — APPOINTMENT (OUTPATIENT)
Dept: OPHTHALMOLOGY | Facility: CLINIC | Age: 89
End: 2025-03-21

## 2025-03-21 ENCOUNTER — NON-APPOINTMENT (OUTPATIENT)
Age: 89
End: 2025-03-21

## 2025-03-21 PROCEDURE — 92134 CPTRZ OPH DX IMG PST SGM RTA: CPT

## 2025-03-21 PROCEDURE — 92014 COMPRE OPH EXAM EST PT 1/>: CPT | Mod: 25

## 2025-04-17 ENCOUNTER — APPOINTMENT (OUTPATIENT)
Dept: UROLOGY | Facility: CLINIC | Age: 89
End: 2025-04-17

## 2025-06-23 ENCOUNTER — APPOINTMENT (OUTPATIENT)
Dept: NEPHROLOGY | Facility: CLINIC | Age: 89
End: 2025-06-23
Payer: MEDICARE

## 2025-06-23 VITALS — SYSTOLIC BLOOD PRESSURE: 122 MMHG | DIASTOLIC BLOOD PRESSURE: 78 MMHG | HEART RATE: 70 BPM

## 2025-06-23 VITALS
TEMPERATURE: 97.3 F | HEART RATE: 61 BPM | WEIGHT: 121 LBS | DIASTOLIC BLOOD PRESSURE: 85 MMHG | HEIGHT: 67 IN | OXYGEN SATURATION: 97 % | SYSTOLIC BLOOD PRESSURE: 145 MMHG | BODY MASS INDEX: 18.99 KG/M2

## 2025-06-23 PROCEDURE — G2211 COMPLEX E/M VISIT ADD ON: CPT

## 2025-06-23 PROCEDURE — 99213 OFFICE O/P EST LOW 20 MIN: CPT

## 2025-06-24 LAB
25(OH)D3 SERPL-MCNC: 45.7 NG/ML
ALBUMIN SERPL ELPH-MCNC: 4.4 G/DL
ALP BLD-CCNC: 62 U/L
ALT SERPL-CCNC: 17 U/L
ANION GAP SERPL CALC-SCNC: 18 MMOL/L
AST SERPL-CCNC: 33 U/L
BASOPHILS # BLD AUTO: 0.05 K/UL
BASOPHILS NFR BLD AUTO: 1.3 %
BILIRUB SERPL-MCNC: 0.6 MG/DL
BUN SERPL-MCNC: 24 MG/DL
CALCIUM SERPL-MCNC: 9 MG/DL
CALCIUM SERPL-MCNC: 9 MG/DL
CHLORIDE SERPL-SCNC: 101 MMOL/L
CO2 SERPL-SCNC: 17 MMOL/L
CREAT SERPL-MCNC: 1.82 MG/DL
EGFRCR SERPLBLD CKD-EPI 2021: 36 ML/MIN/1.73M2
EOSINOPHIL # BLD AUTO: 0.2 K/UL
EOSINOPHIL NFR BLD AUTO: 5.2 %
GLUCOSE SERPL-MCNC: 84 MG/DL
HCT VFR BLD CALC: 45.9 %
HGB BLD-MCNC: 14.3 G/DL
IMM GRANULOCYTES NFR BLD AUTO: 0.3 %
LYMPHOCYTES # BLD AUTO: 0.94 K/UL
LYMPHOCYTES NFR BLD AUTO: 24.2 %
MAGNESIUM SERPL-MCNC: 2.3 MG/DL
MAN DIFF?: NORMAL
MCHC RBC-ENTMCNC: 29.2 PG
MCHC RBC-ENTMCNC: 31.2 G/DL
MCV RBC AUTO: 93.7 FL
MONOCYTES # BLD AUTO: 0.54 K/UL
MONOCYTES NFR BLD AUTO: 13.9 %
NEUTROPHILS # BLD AUTO: 2.14 K/UL
NEUTROPHILS NFR BLD AUTO: 55.1 %
PARATHYROID HORMONE INTACT: 31 PG/ML
PHOSPHATE SERPL-MCNC: 3.5 MG/DL
PLATELET # BLD AUTO: 185 K/UL
POTASSIUM SERPL-SCNC: 4.6 MMOL/L
PROT SERPL-MCNC: 6.9 G/DL
RBC # BLD: 4.9 M/UL
RBC # FLD: 14.2 %
SODIUM SERPL-SCNC: 136 MMOL/L
URATE SERPL-MCNC: 6.4 MG/DL
WBC # FLD AUTO: 3.88 K/UL

## 2025-06-25 ENCOUNTER — APPOINTMENT (OUTPATIENT)
Dept: OTOLARYNGOLOGY | Facility: CLINIC | Age: 89
End: 2025-06-25
Payer: MEDICARE

## 2025-06-25 PROCEDURE — 99214 OFFICE O/P EST MOD 30 MIN: CPT

## 2025-09-15 ENCOUNTER — APPOINTMENT (OUTPATIENT)
Dept: UROLOGY | Facility: CLINIC | Age: 89
End: 2025-09-15
Payer: MEDICARE

## 2025-09-15 DIAGNOSIS — R97.20 ELEVATED PROSTATE, SPECIFIC ANTIGEN [PSA]: ICD-10-CM

## 2025-09-15 DIAGNOSIS — N40.1 BENIGN PROSTATIC HYPERPLASIA WITH LOWER URINARY TRACT SYMPMS: ICD-10-CM

## 2025-09-15 DIAGNOSIS — R35.1 BENIGN PROSTATIC HYPERPLASIA WITH LOWER URINARY TRACT SYMPMS: ICD-10-CM

## 2025-09-15 LAB
ANION GAP SERPL CALC-SCNC: 13 MMOL/L
BUN SERPL-MCNC: 27 MG/DL
CALCIUM SERPL-MCNC: 9.3 MG/DL
CHLORIDE SERPL-SCNC: 107 MMOL/L
CO2 SERPL-SCNC: 22 MMOL/L
CREAT SERPL-MCNC: 1.65 MG/DL
EGFRCR SERPLBLD CKD-EPI 2021: 40 ML/MIN/1.73M2
GLUCOSE SERPL-MCNC: 112 MG/DL
POTASSIUM SERPL-SCNC: 4.7 MMOL/L
PSA FREE FLD-MCNC: 36 %
PSA FREE SERPL-MCNC: 1.01 NG/ML
PSA SERPL-MCNC: 2.81 NG/ML
SODIUM SERPL-SCNC: 142 MMOL/L

## 2025-09-15 PROCEDURE — 99214 OFFICE O/P EST MOD 30 MIN: CPT

## 2025-09-15 PROCEDURE — G2211 COMPLEX E/M VISIT ADD ON: CPT
